# Patient Record
Sex: FEMALE | Race: BLACK OR AFRICAN AMERICAN | NOT HISPANIC OR LATINO | Employment: OTHER | URBAN - METROPOLITAN AREA
[De-identification: names, ages, dates, MRNs, and addresses within clinical notes are randomized per-mention and may not be internally consistent; named-entity substitution may affect disease eponyms.]

---

## 2017-03-22 ENCOUNTER — HISTORICAL (OUTPATIENT)
Dept: INTERNAL MEDICINE | Facility: CLINIC | Age: 67
End: 2017-03-22

## 2017-04-24 ENCOUNTER — HISTORICAL (OUTPATIENT)
Dept: CARDIOLOGY | Facility: HOSPITAL | Age: 67
End: 2017-04-24

## 2017-05-11 ENCOUNTER — HISTORICAL (OUTPATIENT)
Dept: ADMINISTRATIVE | Facility: HOSPITAL | Age: 67
End: 2017-05-11

## 2017-05-11 LAB
ABS NEUT (OLG): 1.96 X10(3)/MCL (ref 2.1–9.2)
APTT PPP: 28.1 SECOND(S) (ref 23.3–37)
BASOPHILS # BLD AUTO: 0.03 X10(3)/MCL
BASOPHILS NFR BLD AUTO: 1 % (ref 0–1)
BUN SERPL-MCNC: 12 MG/DL (ref 7–25)
CALCIUM SERPL-MCNC: 9.9 MG/DL (ref 8.4–10.3)
CHLORIDE SERPL-SCNC: 100 MMOL/L (ref 96–110)
CO2 SERPL-SCNC: 33 MMOL/L (ref 24–32)
CREAT SERPL-MCNC: 0.83 MG/DL (ref 0.7–1.1)
EOSINOPHIL # BLD AUTO: 0.22 X10(3)/MCL
EOSINOPHIL NFR BLD AUTO: 4 % (ref 0–5)
ERYTHROCYTE [DISTWIDTH] IN BLOOD BY AUTOMATED COUNT: 12.3 % (ref 11.5–14.5)
GLUCOSE SERPL-MCNC: 120 MG/DL (ref 65–99)
HCT VFR BLD AUTO: 38.9 % (ref 35–46)
HGB BLD-MCNC: 12.4 GM/DL (ref 12–16)
IMM GRANULOCYTES # BLD AUTO: 0.01 10*3/UL
IMM GRANULOCYTES NFR BLD AUTO: 0 %
INR PPP: 1.1 (ref 0.9–1.2)
LYMPHOCYTES # BLD AUTO: 2.57 X10(3)/MCL
LYMPHOCYTES NFR BLD AUTO: 50 % (ref 15–40)
MCH RBC QN AUTO: 27.7 PG (ref 26–34)
MCHC RBC AUTO-ENTMCNC: 31.9 GM/DL (ref 31–37)
MCV RBC AUTO: 86.8 FL (ref 80–100)
MONOCYTES # BLD AUTO: 0.4 X10(3)/MCL
MONOCYTES NFR BLD AUTO: 8 % (ref 4–12)
NEUTROPHILS # BLD AUTO: 1.96 X10(3)/MCL
NEUTROPHILS NFR BLD AUTO: 38 X10(3)/MCL
PLATELET # BLD AUTO: 233 X10(3)/MCL (ref 130–400)
PMV BLD AUTO: 10.8 FL (ref 7.4–10.4)
POTASSIUM SERPL-SCNC: 3.9 MMOL/L (ref 3.6–5.2)
PROTHROMBIN TIME: 14 SECOND(S) (ref 11.9–14.4)
RBC # BLD AUTO: 4.48 X10(6)/MCL (ref 4–5.2)
SODIUM SERPL-SCNC: 142 MMOL/L (ref 135–146)
WBC # SPEC AUTO: 5.2 X10(3)/MCL (ref 4.5–11)

## 2017-05-22 ENCOUNTER — HISTORICAL (OUTPATIENT)
Dept: CARDIOLOGY | Facility: HOSPITAL | Age: 67
End: 2017-05-22

## 2017-06-28 ENCOUNTER — HISTORICAL (OUTPATIENT)
Dept: CARDIOLOGY | Facility: HOSPITAL | Age: 67
End: 2017-06-28

## 2017-09-22 ENCOUNTER — HISTORICAL (OUTPATIENT)
Dept: INTERNAL MEDICINE | Facility: CLINIC | Age: 67
End: 2017-09-22

## 2017-09-22 LAB
ABS NEUT (OLG): 2.2 X10(3)/MCL (ref 2.1–9.2)
ALBUMIN SERPL-MCNC: 4.2 GM/DL (ref 3.4–5)
ALBUMIN/GLOB SERPL: 1 RATIO (ref 1–2)
ALP SERPL-CCNC: 71 UNIT/L (ref 45–117)
ALT SERPL-CCNC: 19 UNIT/L (ref 12–78)
AST SERPL-CCNC: 14 UNIT/L (ref 15–37)
BASOPHILS # BLD AUTO: 0.02 X10(3)/MCL
BASOPHILS NFR BLD AUTO: 0 % (ref 0–1)
BILIRUB SERPL-MCNC: 0.9 MG/DL (ref 0.2–1)
BILIRUBIN DIRECT+TOT PNL SERPL-MCNC: 0.2 MG/DL
BILIRUBIN DIRECT+TOT PNL SERPL-MCNC: 0.7 MG/DL
BUN SERPL-MCNC: 24 MG/DL (ref 7–18)
CALCIUM SERPL-MCNC: 10.3 MG/DL (ref 8.5–10.1)
CHLORIDE SERPL-SCNC: 100 MMOL/L (ref 98–107)
CHOLEST SERPL-MCNC: 199 MG/DL
CHOLEST/HDLC SERPL: 3.9 {RATIO} (ref 0–4.4)
CO2 SERPL-SCNC: 30 MMOL/L (ref 21–32)
CREAT SERPL-MCNC: 1.2 MG/DL (ref 0.6–1.3)
EOSINOPHIL # BLD AUTO: 0.13 X10(3)/MCL
EOSINOPHIL NFR BLD AUTO: 3 % (ref 0–5)
ERYTHROCYTE [DISTWIDTH] IN BLOOD BY AUTOMATED COUNT: 12.2 % (ref 11.5–14.5)
EST. AVERAGE GLUCOSE BLD GHB EST-MCNC: 269 MG/DL
GLOBULIN SER-MCNC: 3.9 GM/ML (ref 2.3–3.5)
GLUCOSE SERPL-MCNC: 230 MG/DL (ref 74–106)
HBA1C MFR BLD: 11 % (ref 4.2–6.3)
HCT VFR BLD AUTO: 38.2 % (ref 35–46)
HDLC SERPL-MCNC: 51 MG/DL
HGB BLD-MCNC: 12.7 GM/DL (ref 12–16)
IMM GRANULOCYTES # BLD AUTO: 0.01 10*3/UL
IMM GRANULOCYTES NFR BLD AUTO: 0 %
LDLC SERPL CALC-MCNC: 126 MG/DL (ref 0–130)
LYMPHOCYTES # BLD AUTO: 1.95 X10(3)/MCL
LYMPHOCYTES NFR BLD AUTO: 42 % (ref 15–40)
MCH RBC QN AUTO: 28.7 PG (ref 26–34)
MCHC RBC AUTO-ENTMCNC: 33.2 GM/DL (ref 31–37)
MCV RBC AUTO: 86.4 FL (ref 80–100)
MONOCYTES # BLD AUTO: 0.35 X10(3)/MCL
MONOCYTES NFR BLD AUTO: 8 % (ref 4–12)
NEUTROPHILS # BLD AUTO: 2.2 X10(3)/MCL
NEUTROPHILS NFR BLD AUTO: 47 X10(3)/MCL
PLATELET # BLD AUTO: 230 X10(3)/MCL (ref 130–400)
PMV BLD AUTO: 10.5 FL (ref 7.4–10.4)
POTASSIUM SERPL-SCNC: 3.6 MMOL/L (ref 3.5–5.1)
PROT SERPL-MCNC: 8.1 GM/DL (ref 6.4–8.2)
RBC # BLD AUTO: 4.42 X10(6)/MCL (ref 4–5.2)
SODIUM SERPL-SCNC: 137 MMOL/L (ref 136–145)
TRIGL SERPL-MCNC: 108 MG/DL
TSH SERPL-ACNC: 1 MIU/L (ref 0.36–3.74)
VLDLC SERPL CALC-MCNC: 22 MG/DL
WBC # SPEC AUTO: 4.7 X10(3)/MCL (ref 4.5–11)

## 2017-09-28 ENCOUNTER — HISTORICAL (OUTPATIENT)
Dept: INTERNAL MEDICINE | Facility: CLINIC | Age: 67
End: 2017-09-28

## 2018-03-13 ENCOUNTER — HISTORICAL (OUTPATIENT)
Dept: ADMINISTRATIVE | Facility: HOSPITAL | Age: 68
End: 2018-03-13

## 2018-03-13 LAB
BUN SERPL-MCNC: 17 MG/DL (ref 7–18)
CALCIUM SERPL-MCNC: 9.7 MG/DL (ref 8.5–10.1)
CHLORIDE SERPL-SCNC: 102 MMOL/L (ref 98–107)
CO2 SERPL-SCNC: 34 MMOL/L (ref 21–32)
CREAT SERPL-MCNC: 0.9 MG/DL (ref 0.6–1.3)
CREAT/UREA NIT SERPL: 19
EST. AVERAGE GLUCOSE BLD GHB EST-MCNC: 174 MG/DL
GLUCOSE SERPL-MCNC: 98 MG/DL (ref 74–106)
HBA1C MFR BLD: 7.7 % (ref 4.2–6.3)
POTASSIUM SERPL-SCNC: 3.7 MMOL/L (ref 3.5–5.1)
SODIUM SERPL-SCNC: 141 MMOL/L (ref 136–145)

## 2018-05-04 ENCOUNTER — HISTORICAL (OUTPATIENT)
Dept: INTERNAL MEDICINE | Facility: CLINIC | Age: 68
End: 2018-05-04

## 2018-06-20 ENCOUNTER — HISTORICAL (OUTPATIENT)
Dept: INTERNAL MEDICINE | Facility: CLINIC | Age: 68
End: 2018-06-20

## 2018-06-20 LAB
ABS NEUT (OLG): 2.89 X10(3)/MCL (ref 2.1–9.2)
ALBUMIN SERPL-MCNC: 4.3 GM/DL (ref 3.4–5)
ALBUMIN/GLOB SERPL: 1 RATIO (ref 1–2)
ALP SERPL-CCNC: 62 UNIT/L (ref 45–117)
ALT SERPL-CCNC: 23 UNIT/L (ref 12–78)
AST SERPL-CCNC: 23 UNIT/L (ref 15–37)
BASOPHILS # BLD AUTO: 0.04 X10(3)/MCL
BASOPHILS NFR BLD AUTO: 1 %
BILIRUB SERPL-MCNC: 0.6 MG/DL (ref 0.2–1)
BILIRUBIN DIRECT+TOT PNL SERPL-MCNC: 0.2 MG/DL
BILIRUBIN DIRECT+TOT PNL SERPL-MCNC: 0.4 MG/DL
BUN SERPL-MCNC: 18 MG/DL (ref 7–18)
CALCIUM SERPL-MCNC: 10 MG/DL (ref 8.5–10.1)
CHLORIDE SERPL-SCNC: 103 MMOL/L (ref 98–107)
CHOLEST SERPL-MCNC: 173 MG/DL
CHOLEST/HDLC SERPL: 3.5 {RATIO} (ref 0–4.4)
CO2 SERPL-SCNC: 28 MMOL/L (ref 21–32)
CREAT SERPL-MCNC: 1.1 MG/DL (ref 0.6–1.3)
CREAT UR-MCNC: 82 MG/DL
EOSINOPHIL # BLD AUTO: 0.17 X10(3)/MCL
EOSINOPHIL NFR BLD AUTO: 3 %
ERYTHROCYTE [DISTWIDTH] IN BLOOD BY AUTOMATED COUNT: 12.9 % (ref 11.5–14.5)
EST. AVERAGE GLUCOSE BLD GHB EST-MCNC: 148 MG/DL
GLOBULIN SER-MCNC: 3.7 GM/ML (ref 2.3–3.5)
GLUCOSE SERPL-MCNC: 126 MG/DL (ref 74–106)
HBA1C MFR BLD: 6.8 % (ref 4.2–6.3)
HCT VFR BLD AUTO: 38 % (ref 35–46)
HDLC SERPL-MCNC: 49 MG/DL
HGB BLD-MCNC: 11.9 GM/DL (ref 12–16)
IMM GRANULOCYTES # BLD AUTO: 0.01 10*3/UL
IMM GRANULOCYTES NFR BLD AUTO: 0 %
LDLC SERPL CALC-MCNC: 106 MG/DL (ref 0–130)
LYMPHOCYTES # BLD AUTO: 2.37 X10(3)/MCL
LYMPHOCYTES NFR BLD AUTO: 40 % (ref 13–40)
MCH RBC QN AUTO: 27.7 PG (ref 26–34)
MCHC RBC AUTO-ENTMCNC: 31.3 GM/DL (ref 31–37)
MCV RBC AUTO: 88.6 FL (ref 80–100)
MICROALBUMIN UR-MCNC: 7.9 MG/L (ref 0–19)
MICROALBUMIN/CREAT RATIO PNL UR: 9.6 MCG/MG CR (ref 0–29)
MONOCYTES # BLD AUTO: 0.39 X10(3)/MCL
MONOCYTES NFR BLD AUTO: 7 % (ref 4–12)
NEUTROPHILS # BLD AUTO: 2.89 X10(3)/MCL
NEUTROPHILS NFR BLD AUTO: 49 X10(3)/MCL
PLATELET # BLD AUTO: 230 X10(3)/MCL (ref 130–400)
PMV BLD AUTO: 10.2 FL (ref 7.4–10.4)
POTASSIUM SERPL-SCNC: 3.8 MMOL/L (ref 3.5–5.1)
PROT SERPL-MCNC: 8 GM/DL (ref 6.4–8.2)
RBC # BLD AUTO: 4.29 X10(6)/MCL (ref 4–5.2)
SODIUM SERPL-SCNC: 139 MMOL/L (ref 136–145)
TRIGL SERPL-MCNC: 91 MG/DL
VLDLC SERPL CALC-MCNC: 18 MG/DL
WBC # SPEC AUTO: 5.9 X10(3)/MCL (ref 4.5–11)

## 2018-06-25 ENCOUNTER — HISTORICAL (OUTPATIENT)
Dept: RADIOLOGY | Facility: HOSPITAL | Age: 68
End: 2018-06-25

## 2018-08-07 ENCOUNTER — HISTORICAL (OUTPATIENT)
Dept: CARDIOLOGY | Facility: CLINIC | Age: 68
End: 2018-08-07

## 2018-08-07 LAB
APTT PPP: 28.1 SECOND(S) (ref 23.3–37)
BUN SERPL-MCNC: 16 MG/DL (ref 7–18)
CALCIUM SERPL-MCNC: 9.6 MG/DL (ref 8.5–10.1)
CHLORIDE SERPL-SCNC: 102 MMOL/L (ref 98–107)
CO2 SERPL-SCNC: 33 MMOL/L (ref 21–32)
CREAT SERPL-MCNC: 1.1 MG/DL (ref 0.6–1.3)
CREAT/UREA NIT SERPL: 15
ERYTHROCYTE [DISTWIDTH] IN BLOOD BY AUTOMATED COUNT: 12.7 % (ref 11.5–14.5)
GLUCOSE SERPL-MCNC: 106 MG/DL (ref 74–106)
HCT VFR BLD AUTO: 36.4 % (ref 35–46)
HGB BLD-MCNC: 11.6 GM/DL (ref 12–16)
INR PPP: 1.03 (ref 0.9–1.2)
MCH RBC QN AUTO: 28.4 PG (ref 26–34)
MCHC RBC AUTO-ENTMCNC: 31.9 GM/DL (ref 31–37)
MCV RBC AUTO: 89.2 FL (ref 80–100)
PLATELET # BLD AUTO: 234 X10(3)/MCL (ref 130–400)
PMV BLD AUTO: 10.3 FL (ref 7.4–10.4)
POTASSIUM SERPL-SCNC: 3.4 MMOL/L (ref 3.5–5.1)
PROTHROMBIN TIME: 12.8 SECOND(S) (ref 11.9–14.4)
RBC # BLD AUTO: 4.08 X10(6)/MCL (ref 4–5.2)
SODIUM SERPL-SCNC: 141 MMOL/L (ref 136–145)
WBC # SPEC AUTO: 5.9 X10(3)/MCL (ref 4.5–11)

## 2018-08-13 ENCOUNTER — HISTORICAL (OUTPATIENT)
Dept: CARDIOLOGY | Facility: HOSPITAL | Age: 68
End: 2018-08-13

## 2018-09-17 ENCOUNTER — HISTORICAL (OUTPATIENT)
Dept: ADMINISTRATIVE | Facility: HOSPITAL | Age: 68
End: 2018-09-17

## 2018-09-17 LAB
BUN SERPL-MCNC: 17 MG/DL (ref 7–18)
CALCIUM SERPL-MCNC: 10.1 MG/DL (ref 8.5–10.1)
CHLORIDE SERPL-SCNC: 101 MMOL/L (ref 98–107)
CO2 SERPL-SCNC: 30 MMOL/L (ref 21–32)
CREAT SERPL-MCNC: 1.1 MG/DL (ref 0.6–1.3)
CREAT/UREA NIT SERPL: 15
EST. AVERAGE GLUCOSE BLD GHB EST-MCNC: 126 MG/DL
GLUCOSE SERPL-MCNC: 124 MG/DL (ref 74–106)
HBA1C MFR BLD: 6 % (ref 4.2–6.3)
POTASSIUM SERPL-SCNC: 3.7 MMOL/L (ref 3.5–5.1)
SODIUM SERPL-SCNC: 138 MMOL/L (ref 136–145)

## 2018-11-13 ENCOUNTER — HISTORICAL (OUTPATIENT)
Dept: SLEEP MEDICINE | Facility: HOSPITAL | Age: 68
End: 2018-11-13

## 2019-03-14 ENCOUNTER — HISTORICAL (OUTPATIENT)
Dept: ADMINISTRATIVE | Facility: HOSPITAL | Age: 69
End: 2019-03-14

## 2019-03-14 LAB
ABS NEUT (OLG): 2.74 X10(3)/MCL (ref 2.1–9.2)
ALBUMIN SERPL-MCNC: 4.3 GM/DL (ref 3.4–5)
ALBUMIN/GLOB SERPL: 1.1 RATIO (ref 1.1–2)
ALP SERPL-CCNC: 57 UNIT/L (ref 45–117)
ALT SERPL-CCNC: 19 UNIT/L (ref 12–78)
AST SERPL-CCNC: 16 UNIT/L (ref 15–37)
BASOPHILS # BLD AUTO: 0.02 X10(3)/MCL
BASOPHILS NFR BLD AUTO: 0 %
BILIRUB SERPL-MCNC: 0.6 MG/DL (ref 0.2–1)
BILIRUBIN DIRECT+TOT PNL SERPL-MCNC: 0.2 MG/DL
BILIRUBIN DIRECT+TOT PNL SERPL-MCNC: 0.4 MG/DL
BUN SERPL-MCNC: 11 MG/DL (ref 7–18)
CALCIUM SERPL-MCNC: 10 MG/DL (ref 8.5–10.1)
CHLORIDE SERPL-SCNC: 103 MMOL/L (ref 98–107)
CHOLEST SERPL-MCNC: 190 MG/DL
CHOLEST/HDLC SERPL: 2.9 {RATIO} (ref 0–4.4)
CO2 SERPL-SCNC: 32 MMOL/L (ref 21–32)
CREAT SERPL-MCNC: 1 MG/DL (ref 0.6–1.3)
CREAT UR-MCNC: 63 MG/DL
EOSINOPHIL # BLD AUTO: 0.14 X10(3)/MCL
EOSINOPHIL NFR BLD AUTO: 3 %
ERYTHROCYTE [DISTWIDTH] IN BLOOD BY AUTOMATED COUNT: 12.6 % (ref 11.5–14.5)
EST. AVERAGE GLUCOSE BLD GHB EST-MCNC: 114 MG/DL
GLOBULIN SER-MCNC: 4 GM/ML (ref 2.3–3.5)
GLUCOSE SERPL-MCNC: 100 MG/DL (ref 74–106)
HBA1C MFR BLD: 5.6 % (ref 4.2–6.3)
HCT VFR BLD AUTO: 37.6 % (ref 35–46)
HDLC SERPL-MCNC: 66 MG/DL
HGB BLD-MCNC: 11.8 GM/DL (ref 12–16)
IMM GRANULOCYTES # BLD AUTO: 0.01 10*3/UL
IMM GRANULOCYTES NFR BLD AUTO: 0 %
LDLC SERPL CALC-MCNC: 103 MG/DL (ref 0–130)
LYMPHOCYTES # BLD AUTO: 2 X10(3)/MCL
LYMPHOCYTES NFR BLD AUTO: 37 % (ref 13–40)
MCH RBC QN AUTO: 28.6 PG (ref 26–34)
MCHC RBC AUTO-ENTMCNC: 31.4 GM/DL (ref 31–37)
MCV RBC AUTO: 91 FL (ref 80–100)
MICROALBUMIN UR-MCNC: <5 MG/L (ref 0–19)
MICROALBUMIN/CREAT RATIO PNL UR: <7.9 MCG/MG CR (ref 0–29)
MONOCYTES # BLD AUTO: 0.45 X10(3)/MCL
MONOCYTES NFR BLD AUTO: 8 % (ref 4–12)
NEUTROPHILS # BLD AUTO: 2.74 X10(3)/MCL
NEUTROPHILS NFR BLD AUTO: 51 X10(3)/MCL
PLATELET # BLD AUTO: 220 X10(3)/MCL (ref 130–400)
PMV BLD AUTO: 10.5 FL (ref 7.4–10.4)
POTASSIUM SERPL-SCNC: 3.6 MMOL/L (ref 3.5–5.1)
PROT SERPL-MCNC: 8.3 GM/DL (ref 6.4–8.2)
RBC # BLD AUTO: 4.13 X10(6)/MCL (ref 4–5.2)
SODIUM SERPL-SCNC: 138 MMOL/L (ref 136–145)
TRIGL SERPL-MCNC: 104 MG/DL
VLDLC SERPL CALC-MCNC: 21 MG/DL
WBC # SPEC AUTO: 5.4 X10(3)/MCL (ref 4.5–11)

## 2019-05-03 ENCOUNTER — HISTORICAL (OUTPATIENT)
Dept: RADIOLOGY | Facility: HOSPITAL | Age: 69
End: 2019-05-03

## 2019-06-06 ENCOUNTER — HISTORICAL (OUTPATIENT)
Dept: ADMINISTRATIVE | Facility: HOSPITAL | Age: 69
End: 2019-06-06

## 2019-06-11 ENCOUNTER — HISTORICAL (OUTPATIENT)
Dept: ADMINISTRATIVE | Facility: HOSPITAL | Age: 69
End: 2019-06-11

## 2019-06-11 LAB
ABS NEUT (OLG): 2.31 X10(3)/MCL (ref 2.1–9.2)
ALBUMIN SERPL-MCNC: 4.2 GM/DL (ref 3.4–5)
ALBUMIN/GLOB SERPL: 1.1 RATIO (ref 1.1–2)
ALP SERPL-CCNC: 54 UNIT/L (ref 45–117)
ALT SERPL-CCNC: 21 UNIT/L (ref 12–78)
AST SERPL-CCNC: 15 UNIT/L (ref 15–37)
BASOPHILS # BLD AUTO: 0.02 X10(3)/MCL
BASOPHILS NFR BLD AUTO: 0 %
BILIRUB SERPL-MCNC: 0.6 MG/DL (ref 0.2–1)
BILIRUBIN DIRECT+TOT PNL SERPL-MCNC: 0.1 MG/DL
BILIRUBIN DIRECT+TOT PNL SERPL-MCNC: 0.5 MG/DL
BUN SERPL-MCNC: 11 MG/DL (ref 7–18)
CALCIUM SERPL-MCNC: 10 MG/DL (ref 8.5–10.1)
CHLORIDE SERPL-SCNC: 106 MMOL/L (ref 98–107)
CO2 SERPL-SCNC: 33 MMOL/L (ref 21–32)
CREAT SERPL-MCNC: 1 MG/DL (ref 0.6–1.3)
CRP SERPL-MCNC: <0.3 MG/DL
EOSINOPHIL # BLD AUTO: 0.16 10*3/UL
EOSINOPHIL NFR BLD AUTO: 3 %
ERYTHROCYTE [DISTWIDTH] IN BLOOD BY AUTOMATED COUNT: 12.2 % (ref 11.5–14.5)
ERYTHROCYTE [SEDIMENTATION RATE] IN BLOOD: 8 MM/HR (ref 0–20)
GLOBULIN SER-MCNC: 3.8 GM/ML (ref 2.3–3.5)
GLUCOSE SERPL-MCNC: 93 MG/DL (ref 74–106)
HCT VFR BLD AUTO: 36.9 % (ref 35–46)
HGB BLD-MCNC: 11.7 GM/DL (ref 12–16)
LYMPHOCYTES # BLD AUTO: 2.08 X10(3)/MCL
LYMPHOCYTES NFR BLD AUTO: 42 % (ref 13–40)
MCH RBC QN AUTO: 28.2 PG (ref 26–34)
MCHC RBC AUTO-ENTMCNC: 31.7 GM/DL (ref 31–37)
MCV RBC AUTO: 88.9 FL (ref 80–100)
MONOCYTES # BLD AUTO: 0.42 X10(3)/MCL
MONOCYTES NFR BLD AUTO: 8 % (ref 4–12)
NEUTROPHILS # BLD AUTO: 2.31 X10(3)/MCL
NEUTROPHILS NFR BLD AUTO: 46 X10(3)/MCL
PLATELET # BLD AUTO: 231 X10(3)/MCL (ref 130–400)
PMV BLD AUTO: 10.4 FL (ref 7.4–10.4)
POTASSIUM SERPL-SCNC: 4.3 MMOL/L (ref 3.5–5.1)
PROT SERPL-MCNC: 8 GM/DL (ref 6.4–8.2)
RBC # BLD AUTO: 4.15 X10(6)/MCL (ref 4–5.2)
SODIUM SERPL-SCNC: 142 MMOL/L (ref 136–145)
TSH SERPL-ACNC: 1.17 MIU/L (ref 0.36–3.74)
WBC # SPEC AUTO: 5 X10(3)/MCL (ref 4.5–11)

## 2019-06-25 ENCOUNTER — HISTORICAL (OUTPATIENT)
Dept: RADIOLOGY | Facility: HOSPITAL | Age: 69
End: 2019-06-25

## 2019-08-22 ENCOUNTER — HISTORICAL (OUTPATIENT)
Dept: INTERNAL MEDICINE | Facility: CLINIC | Age: 69
End: 2019-08-22

## 2019-09-09 ENCOUNTER — HISTORICAL (OUTPATIENT)
Dept: INTERNAL MEDICINE | Facility: CLINIC | Age: 69
End: 2019-09-09

## 2019-09-09 LAB
BUN SERPL-MCNC: 13 MG/DL (ref 7–18)
CALCIUM SERPL-MCNC: 9.8 MG/DL (ref 8.5–10.1)
CHLORIDE SERPL-SCNC: 104 MMOL/L (ref 98–107)
CO2 SERPL-SCNC: 31 MMOL/L (ref 21–32)
CREAT SERPL-MCNC: 1 MG/DL (ref 0.6–1.3)
CREAT/UREA NIT SERPL: 13
EST. AVERAGE GLUCOSE BLD GHB EST-MCNC: 117 MG/DL
GLUCOSE SERPL-MCNC: 123 MG/DL (ref 74–106)
HBA1C MFR BLD: 5.7 % (ref 4.2–6.3)
POTASSIUM SERPL-SCNC: 4 MMOL/L (ref 3.5–5.1)
SODIUM SERPL-SCNC: 140 MMOL/L (ref 136–145)

## 2019-09-20 ENCOUNTER — HISTORICAL (OUTPATIENT)
Dept: RADIOLOGY | Facility: HOSPITAL | Age: 69
End: 2019-09-20

## 2019-11-11 ENCOUNTER — HISTORICAL (OUTPATIENT)
Dept: RADIOLOGY | Facility: HOSPITAL | Age: 69
End: 2019-11-11

## 2020-03-16 ENCOUNTER — HISTORICAL (OUTPATIENT)
Dept: INTERNAL MEDICINE | Facility: CLINIC | Age: 70
End: 2020-03-16

## 2020-03-16 LAB
ABS NEUT (OLG): 2.02 X10(3)/MCL (ref 2.1–9.2)
ALBUMIN SERPL-MCNC: 4 GM/DL (ref 3.4–5)
ALBUMIN/GLOB SERPL: 1.1 RATIO (ref 1.1–2)
ALP SERPL-CCNC: 55 UNIT/L (ref 45–117)
ALT SERPL-CCNC: 17 UNIT/L (ref 12–78)
AST SERPL-CCNC: 12 UNIT/L (ref 15–37)
BASOPHILS # BLD AUTO: 0 X10(3)/MCL (ref 0–0.2)
BASOPHILS NFR BLD AUTO: 1 %
BILIRUB SERPL-MCNC: 0.6 MG/DL (ref 0.2–1)
BILIRUBIN DIRECT+TOT PNL SERPL-MCNC: 0.2 MG/DL (ref 0–0.2)
BILIRUBIN DIRECT+TOT PNL SERPL-MCNC: 0.4 MG/DL
BUN SERPL-MCNC: 19 MG/DL (ref 7–18)
CALCIUM SERPL-MCNC: 9.5 MG/DL (ref 8.5–10.1)
CHLORIDE SERPL-SCNC: 107 MMOL/L (ref 98–107)
CHOLEST SERPL-MCNC: 128 MG/DL
CHOLEST/HDLC SERPL: 2.2 {RATIO} (ref 0–4.4)
CO2 SERPL-SCNC: 30 MMOL/L (ref 21–32)
CREAT SERPL-MCNC: 1 MG/DL (ref 0.6–1.3)
CREAT UR-MCNC: 28 MG/DL
EOSINOPHIL # BLD AUTO: 0.2 X10(3)/MCL (ref 0–0.9)
EOSINOPHIL NFR BLD AUTO: 4 %
ERYTHROCYTE [DISTWIDTH] IN BLOOD BY AUTOMATED COUNT: 12.6 % (ref 11.5–14.5)
EST. AVERAGE GLUCOSE BLD GHB EST-MCNC: 85 MG/DL
GLOBULIN SER-MCNC: 3.6 GM/ML (ref 2.3–3.5)
GLUCOSE SERPL-MCNC: 86 MG/DL (ref 74–106)
HBA1C MFR BLD: 4.6 % (ref 4.2–6.3)
HCT VFR BLD AUTO: 35.2 % (ref 35–46)
HDLC SERPL-MCNC: 57 MG/DL (ref 40–59)
HGB BLD-MCNC: 10.7 GM/DL (ref 12–16)
IMM GRANULOCYTES # BLD AUTO: 0.01 10*3/UL
IMM GRANULOCYTES NFR BLD AUTO: 0 %
LDLC SERPL CALC-MCNC: 53 MG/DL
LYMPHOCYTES # BLD AUTO: 2.5 X10(3)/MCL (ref 0.6–4.6)
LYMPHOCYTES NFR BLD AUTO: 48 %
MCH RBC QN AUTO: 27.6 PG (ref 26–34)
MCHC RBC AUTO-ENTMCNC: 30.4 GM/DL (ref 31–37)
MCV RBC AUTO: 91 FL (ref 80–100)
MICROALBUMIN UR-MCNC: 24 MG/L (ref 0–19)
MICROALBUMIN/CREAT RATIO PNL UR: 85.7 MCG/MG CR (ref 0–29)
MONOCYTES # BLD AUTO: 0.4 X10(3)/MCL (ref 0.1–1.3)
MONOCYTES NFR BLD AUTO: 8 %
NEUTROPHILS # BLD AUTO: 2.02 X10(3)/MCL (ref 2.1–9.2)
NEUTROPHILS NFR BLD AUTO: 39 %
PLATELET # BLD AUTO: 222 X10(3)/MCL (ref 130–400)
PMV BLD AUTO: 10.2 FL (ref 7.4–10.4)
POTASSIUM SERPL-SCNC: 3.9 MMOL/L (ref 3.5–5.1)
PROT SERPL-MCNC: 7.6 GM/DL (ref 6.4–8.2)
RBC # BLD AUTO: 3.87 X10(6)/MCL (ref 4–5.2)
SODIUM SERPL-SCNC: 142 MMOL/L (ref 136–145)
TRIGL SERPL-MCNC: 90 MG/DL
VLDLC SERPL CALC-MCNC: 18 MG/DL
WBC # SPEC AUTO: 5.2 X10(3)/MCL (ref 4.5–11)

## 2020-06-15 ENCOUNTER — HISTORICAL (OUTPATIENT)
Dept: PREADMISSION TESTING | Facility: HOSPITAL | Age: 70
End: 2020-06-15

## 2020-06-15 LAB
ANTINUCLEAR ANTIBODY SCREEN (OHS): NEGATIVE
CRP SERPL-MCNC: <0.3 MG/DL
DSDNA ANTIBODY (OHS): NEGATIVE
ERYTHROCYTE [SEDIMENTATION RATE] IN BLOOD: 13 MM/HR (ref 0–20)
T PALLIDUM AB SER QL: NONREACTIVE

## 2020-10-01 ENCOUNTER — HISTORICAL (OUTPATIENT)
Dept: RADIOLOGY | Facility: HOSPITAL | Age: 70
End: 2020-10-01

## 2020-10-19 ENCOUNTER — HISTORICAL (OUTPATIENT)
Dept: RADIOLOGY | Facility: HOSPITAL | Age: 70
End: 2020-10-19

## 2020-10-19 LAB
CRP SERPL HS-MCNC: 0.05 MG/DL
EST. AVERAGE GLUCOSE BLD GHB EST-MCNC: 120 MG/DL
FOLATE SERPL-MCNC: 9.4 NG/ML (ref 3.1–17.5)
HBA1C MFR BLD: 5.8 % (ref 4.2–6.3)
PROT SERPL-MCNC: 6.9 GM/DL
T4 FREE SERPL-MCNC: 0.94 NG/DL (ref 0.76–1.46)
TSH SERPL-ACNC: 0.95 MIU/L (ref 0.36–3.74)
VIT B12 SERPL-MCNC: 134 PG/ML (ref 193–986)

## 2020-10-21 ENCOUNTER — HISTORICAL (OUTPATIENT)
Dept: RADIOLOGY | Facility: HOSPITAL | Age: 70
End: 2020-10-21

## 2020-11-04 ENCOUNTER — HISTORICAL (OUTPATIENT)
Dept: RADIOLOGY | Facility: HOSPITAL | Age: 70
End: 2020-11-04

## 2020-11-12 ENCOUNTER — HISTORICAL (OUTPATIENT)
Dept: PREADMISSION TESTING | Facility: HOSPITAL | Age: 70
End: 2020-11-12

## 2020-11-16 ENCOUNTER — HISTORICAL (OUTPATIENT)
Dept: ADMINISTRATIVE | Facility: HOSPITAL | Age: 70
End: 2020-11-16

## 2020-12-09 ENCOUNTER — HISTORICAL (OUTPATIENT)
Dept: ADMINISTRATIVE | Facility: HOSPITAL | Age: 70
End: 2020-12-09

## 2020-12-09 LAB — VIT B12 SERPL-MCNC: 733 PG/ML (ref 213–816)

## 2021-05-08 ENCOUNTER — HISTORICAL (OUTPATIENT)
Dept: ADMINISTRATIVE | Facility: HOSPITAL | Age: 71
End: 2021-05-08

## 2021-05-08 ENCOUNTER — HISTORICAL (OUTPATIENT)
Dept: LAB | Facility: HOSPITAL | Age: 71
End: 2021-05-08

## 2021-05-08 LAB
ABS NEUT (OLG): 2.37 X10(3)/MCL (ref 2.1–9.2)
ALBUMIN SERPL-MCNC: 4.3 GM/DL (ref 3.4–4.8)
ALBUMIN/GLOB SERPL: 1.5 RATIO (ref 1.1–2)
ALP SERPL-CCNC: 53 UNIT/L (ref 40–150)
ALT SERPL-CCNC: 8 UNIT/L (ref 0–55)
AST SERPL-CCNC: 17 UNIT/L (ref 5–34)
BASOPHILS # BLD AUTO: 0 X10(3)/MCL (ref 0–0.2)
BASOPHILS NFR BLD AUTO: 0 %
BILIRUB SERPL-MCNC: 1.1 MG/DL
BILIRUBIN DIRECT+TOT PNL SERPL-MCNC: 0.5 MG/DL (ref 0–0.5)
BILIRUBIN DIRECT+TOT PNL SERPL-MCNC: 0.6 MG/DL (ref 0–0.8)
BUN SERPL-MCNC: 21.4 MG/DL (ref 9.8–20.1)
CALCIUM SERPL-MCNC: 10.6 MG/DL (ref 8.4–10.2)
CHLORIDE SERPL-SCNC: 106 MMOL/L (ref 98–107)
CHOLEST SERPL-MCNC: 117 MG/DL
CHOLEST/HDLC SERPL: 3 {RATIO} (ref 0–5)
CO2 SERPL-SCNC: 25 MMOL/L (ref 23–31)
CREAT SERPL-MCNC: 1.58 MG/DL (ref 0.55–1.02)
CREAT UR-MCNC: 143.1 MG/DL (ref 45–106)
EOSINOPHIL # BLD AUTO: 0.2 X10(3)/MCL (ref 0–0.9)
EOSINOPHIL NFR BLD AUTO: 4 %
ERYTHROCYTE [DISTWIDTH] IN BLOOD BY AUTOMATED COUNT: 12.9 % (ref 11.5–14.5)
EST. AVERAGE GLUCOSE BLD GHB EST-MCNC: 99.7 MG/DL
GLOBULIN SER-MCNC: 2.8 GM/DL (ref 2.4–3.5)
GLUCOSE SERPL-MCNC: 85 MG/DL (ref 82–115)
HBA1C MFR BLD: 5.1 %
HCT VFR BLD AUTO: 34.2 % (ref 35–46)
HDLC SERPL-MCNC: 43 MG/DL (ref 35–60)
HGB BLD-MCNC: 10.5 GM/DL (ref 12–16)
IMM GRANULOCYTES # BLD AUTO: 0.01 10*3/UL
IMM GRANULOCYTES NFR BLD AUTO: 0 %
LDLC SERPL CALC-MCNC: 61 MG/DL (ref 50–140)
LYMPHOCYTES # BLD AUTO: 1.8 X10(3)/MCL (ref 0.6–4.6)
LYMPHOCYTES NFR BLD AUTO: 37 %
MCH RBC QN AUTO: 27.6 PG (ref 26–34)
MCHC RBC AUTO-ENTMCNC: 30.7 GM/DL (ref 31–37)
MCV RBC AUTO: 89.8 FL (ref 80–100)
MICROALBUMIN UR-MCNC: 6.6 MG/L
MICROALBUMIN/CREAT RATIO PNL UR: 4.6 MG/GM CR (ref 0–30)
MONOCYTES # BLD AUTO: 0.4 X10(3)/MCL (ref 0.1–1.3)
MONOCYTES NFR BLD AUTO: 8 %
NEUTROPHILS # BLD AUTO: 2.37 X10(3)/MCL (ref 2.1–9.2)
NEUTROPHILS NFR BLD AUTO: 50 %
PLATELET # BLD AUTO: 222 X10(3)/MCL (ref 130–400)
PMV BLD AUTO: 9.9 FL (ref 7.4–10.4)
POTASSIUM SERPL-SCNC: 4.2 MMOL/L (ref 3.5–5.1)
PROT SERPL-MCNC: 7.1 GM/DL (ref 5.8–7.6)
RBC # BLD AUTO: 3.81 X10(6)/MCL (ref 4–5.2)
SODIUM SERPL-SCNC: 143 MMOL/L (ref 136–145)
TRIGL SERPL-MCNC: 65 MG/DL (ref 37–140)
VLDLC SERPL CALC-MCNC: 13 MG/DL
WBC # SPEC AUTO: 4.8 X10(3)/MCL (ref 4.5–11)

## 2021-06-08 ENCOUNTER — HISTORICAL (OUTPATIENT)
Dept: RADIOLOGY | Facility: HOSPITAL | Age: 71
End: 2021-06-08

## 2021-06-22 ENCOUNTER — HISTORICAL (OUTPATIENT)
Dept: ADMINISTRATIVE | Facility: HOSPITAL | Age: 71
End: 2021-06-22

## 2021-06-30 ENCOUNTER — HISTORICAL (OUTPATIENT)
Dept: SURGERY | Facility: HOSPITAL | Age: 71
End: 2021-06-30

## 2021-10-25 ENCOUNTER — HISTORICAL (OUTPATIENT)
Dept: RADIOLOGY | Facility: HOSPITAL | Age: 71
End: 2021-10-25

## 2021-12-04 ENCOUNTER — HISTORICAL (OUTPATIENT)
Dept: LAB | Facility: HOSPITAL | Age: 71
End: 2021-12-04

## 2021-12-04 LAB
BUN SERPL-MCNC: 17.8 MG/DL (ref 9.8–20.1)
CALCIUM SERPL-MCNC: 11.8 MG/DL (ref 8.7–10.5)
CHLORIDE SERPL-SCNC: 108 MMOL/L (ref 98–107)
CO2 SERPL-SCNC: 26 MMOL/L (ref 23–31)
CREAT SERPL-MCNC: 1.09 MG/DL (ref 0.55–1.02)
CREAT/UREA NIT SERPL: 16
EST. AVERAGE GLUCOSE BLD GHB EST-MCNC: 91.1 MG/DL
GLUCOSE SERPL-MCNC: 97 MG/DL (ref 82–115)
HBA1C MFR BLD: 4.8 %
POTASSIUM SERPL-SCNC: 4.6 MMOL/L (ref 3.5–5.1)
SODIUM SERPL-SCNC: 141 MMOL/L (ref 136–145)

## 2022-04-11 ENCOUNTER — HISTORICAL (OUTPATIENT)
Dept: ADMINISTRATIVE | Facility: HOSPITAL | Age: 72
End: 2022-04-11
Payer: MEDICARE

## 2022-04-28 ENCOUNTER — HISTORICAL (OUTPATIENT)
Dept: PREADMISSION TESTING | Facility: HOSPITAL | Age: 72
End: 2022-04-28
Payer: MEDICARE

## 2022-04-28 ENCOUNTER — HISTORICAL (OUTPATIENT)
Dept: ADMINISTRATIVE | Facility: HOSPITAL | Age: 72
End: 2022-04-28
Payer: MEDICARE

## 2022-04-28 VITALS
DIASTOLIC BLOOD PRESSURE: 67 MMHG | WEIGHT: 168.19 LBS | BODY MASS INDEX: 28.02 KG/M2 | OXYGEN SATURATION: 99 % | SYSTOLIC BLOOD PRESSURE: 120 MMHG | HEIGHT: 65 IN

## 2022-04-28 LAB
ABS NEUT (OLG): 3.37 (ref 2.1–9.2)
ALBUMIN SERPL-MCNC: 4.1 G/DL (ref 3.4–4.8)
ALBUMIN/GLOB SERPL: 1.4 {RATIO} (ref 1.1–2)
ALP SERPL-CCNC: 67 U/L (ref 40–150)
ALT SERPL-CCNC: 11 U/L (ref 0–55)
APTT PPP: 32.1 S (ref 23.2–33.7)
AST SERPL-CCNC: 17 U/L (ref 5–34)
BASOPHILS # BLD AUTO: 0 10*3/UL (ref 0–0.2)
BASOPHILS NFR BLD AUTO: 0 %
BILIRUB SERPL-MCNC: 0.7 MG/DL
BILIRUBIN DIRECT+TOT PNL SERPL-MCNC: 0.3 (ref 0–0.5)
BILIRUBIN DIRECT+TOT PNL SERPL-MCNC: 0.4 (ref 0–0.8)
BUN SERPL-MCNC: 23 MG/DL (ref 9.8–20.1)
CALCIUM SERPL-MCNC: 10.3 MG/DL (ref 8.7–10.5)
CHLORIDE SERPL-SCNC: 105 MMOL/L (ref 98–107)
CO2 SERPL-SCNC: 28 MMOL/L (ref 23–31)
CREAT SERPL-MCNC: 1.23 MG/DL (ref 0.55–1.02)
EOSINOPHIL # BLD AUTO: 0.3 10*3/UL (ref 0–0.9)
EOSINOPHIL NFR BLD AUTO: 6 %
ERYTHROCYTE [DISTWIDTH] IN BLOOD BY AUTOMATED COUNT: 13.4 % (ref 11.5–17)
GLOBULIN SER-MCNC: 3 G/DL (ref 2.4–3.5)
GLUCOSE SERPL-MCNC: 141 MG/DL (ref 82–115)
HCT VFR BLD AUTO: 36.5 % (ref 37–47)
HEMOLYSIS INTERF INDEX SERPL-ACNC: 0
HGB BLD-MCNC: 10.9 G/DL (ref 12–16)
ICTERIC INTERF INDEX SERPL-ACNC: 1
INR PPP: 1 (ref 0–1.3)
LIPEMIC INTERF INDEX SERPL-ACNC: 0
LYMPHOCYTES # BLD AUTO: 1.4 10*3/UL (ref 0.6–4.6)
LYMPHOCYTES NFR BLD AUTO: 24 %
MANUAL DIFF? (OHS): NO
MCH RBC QN AUTO: 27.6 PG (ref 27–31)
MCHC RBC AUTO-ENTMCNC: 29.9 G/DL (ref 33–36)
MCV RBC AUTO: 92.4 FL (ref 80–94)
MONOCYTES # BLD AUTO: 0.5 10*3/UL (ref 0.1–1.3)
MONOCYTES NFR BLD AUTO: 9 %
NEUTROPHILS # BLD AUTO: 3.37 10*3/UL (ref 2.1–9.2)
NEUTROPHILS NFR BLD AUTO: 59 %
PLATELET # BLD AUTO: 218 10*3/UL (ref 130–400)
PMV BLD AUTO: 10.7 FL (ref 9.4–12.4)
POS ERR1 (OHS): NORMAL
POTASSIUM SERPL-SCNC: 4.5 MMOL/L (ref 3.5–5.1)
PROT SERPL-MCNC: 7.1 G/DL (ref 5.8–7.6)
PROTHROMBIN TIME: 13.5 S (ref 12.5–14.5)
RBC # BLD AUTO: 3.95 10*6/UL (ref 4.2–5.4)
SODIUM SERPL-SCNC: 143 MMOL/L (ref 136–145)
WBC # SPEC AUTO: 5.7 10*3/UL (ref 4.5–11.5)

## 2022-04-30 NOTE — PROGRESS NOTES
Patient:   Neris Contreras            MRN: [id number]            FIN: [id number]               Age:   68 years     Sex:  Female     :  1950   Associated Diagnoses:   None   Author:   Temo PEREZ, Paula Marie reviewed: Will discuss with patient during follow up appointment on  2018 at 9:50am.

## 2022-04-30 NOTE — OP NOTE
DATE OF SURGERY:        SURGEON:  Porfirio Cortez MD  ASSISTANT:  Ruby Hunt NP    PREOPERATIVE DIAGNOSIS:  Vitreitis, nonproliferative diabetic retinopathy, and diabetic macular edema, all to the left eye.    POSTOPERATIVE DIAGNOSES:  Vitreitis, nonproliferative diabetic retinopathy, and diabetic macular edema, all to the left eye.    PROCEDURES:  Pars plana vitrectomy with focal endolaser and injection of Kenalog, all to the left eye.    ANESTHESIA:  General.    ESTIMATED BLOOD LOSS:  Less than 5 cc.    COMPLICATIONS:  None.    PROCEDURE INDICATIONS:  Ms. Contreras has a history of severe nonproliferative diabetic retinopathy with chronic clinically significant macular edema.  She also has a history of vitreitis and she requires a vitrectomy with removal of the vitreitis and focal endolaser to the left eye.    PROCEDURE DESCRIPTION:  The patient was taken to the operative theater where general anesthesia was begun.  The left eye was prepped and draped in the normal sterile fashion and a lid speculum was applied.  A standard 3-port, 25-gauge pars plana vitrectomy was performed with all trocars placed 3.5 mm from the surgical limbus.  A core vitrectomy was performed, including removal of the vitreitis and the posterior hyaloid out to the peripheral retina.  Endolaser was used to apply focal laser to a center of hard exudates that was temporal to the macula without complication.  The peripheral retina was examined and no retinal breaks were identified.  Kenalog 1 mg was injected into the vitreous cavity for postoperative inflammation.  All instruments were removed from the eye and all sclerotomies were massaged closed with cotton-tip swabs.  Several drops of TobraDex ophthalmic solution was applied to the eye.  The postoperative intraocular pressure was 15 mmHg.  The lid speculum and eye drape were then removed and the eye was covered with gauze patch and a Lopez shield.  The patient was then transported  to the postoperative care unit to recover.    DISCHARGE CONDITION:  Good.      DISPOSITION:  Home with followup with Dr. Cortez the following day.  This patient tolerated the procedure well.        ______________________________  Porfirio Cortez MD    RIB/UD  DD:  11/16/2020  Time:  08:11AM  DT:  11/16/2020  Time:  08:31AM  Job #:  [id number]

## 2022-04-30 NOTE — PROGRESS NOTES
Patient:   Neris Contreras            MRN: [id number]            FIN: [id number]               Age:   69 years     Sex:  Female     :  1950   Associated Diagnoses:   None   Author:   Temo PEREZ, Paula Marie reviewed: Will discuss with patient during follow up appointment on  2019 at 8:40am.

## 2022-05-02 ENCOUNTER — TELEPHONE (OUTPATIENT)
Dept: GYNECOLOGY | Facility: CLINIC | Age: 72
End: 2022-05-02
Payer: MEDICARE

## 2022-05-02 NOTE — TELEPHONE ENCOUNTER
----- Message from Mariel Bass sent at 5/2/2022  9:50 AM CDT -----  Above Pt called stating she had a voice message to call janet back for her results.   [phone] Home   [phone] Cell  Please advise, Thanks!

## 2022-05-04 NOTE — HISTORICAL [facility] CERNER
This is a historical note converted from [employer]. Formatting and pictures may have been removed.  Please reference Gaudencio for original formatting and attached multimedia. Date of Procedure:?06/30/21  ?  Attending Surgeon:?Dr. Henry  ?  Resident Surgeon: Sree Bullock Jr., MD (PGYI)  ?   Pre-op Diagnosis:?Benign lipomatous neoplasm, right anterior thigh  ?  Post-op Diagnosis: Same  ?   Procedure: Excision of subcutaneous mass right anterior thigh  ?   Findings: Fatty, tumor-like mass?measuring approximately?5 x 4 cm?without any apparent?visual malignancy.??A second roughly 2 x 3 cm mass?just superior to the first it was also removed. ?Gross total resection achieved.  ?   Specimen:?Pathology Tissue [facility] (Right thigh mass,AP Specimen) -right?thigh mass  ?  Procedure Detail:?After informed, witnessed, written consent was obtained, the patient was transported operating room and moved to the operating table in the supine position.? MAC anesthesia was achieved.? The patients right thigh was prepped and draped in the usual sterile fashion. ?A timeout was conducted to ensure the proper?patient, procedure, laterality, and that all safety procedures were being followed. ?It was deemed safe to proceed.? The patient was injected with 1%?Marcaine?along the?incision site and around the mass for local anesthesia.? An 8 cm transverse incision was made?overlying the mass.? Monopolar Bovie electrocautery was used to dissect down through the dermis?and the underlying subcutaneous tissue. ?The mass was encountered almost instantaneously.? We then use a combination of?sharp?and electrocautery dissection?to?dissect the mass away from the surrounding, normal-appearing tissue.? The mass contains several?pseudopod-like projections which were carefully dissected?away from the?normal-appearing tissue. ?The primary mass, measuring roughly 5 x 4 cm, was removed?first. ?We then felt that there was still?abnormal tissue present superior to  this?and after dissection realized a second?fatty?tumor-like mass existed?in this area. ?A roughly 2 x 3 cm mass was dissected from this area.? These were all sent for pathologic evaluation.? Once all the dissection was done,?we achieved meticulous hemostasis with monopolar Bovie electrocautery. ?The wound was then irrigated with copious amounts of normal saline.? The wound was then reexamined for any?obvious,?overt bleeding. ?There was none seen.? The wound was then closed with?interrupted 3-0 Vicryl?deep dermals?and a running 4-0 Monocryl.  ?   At the end of the procedure all sponge, needle, and instrument counts were correct x2. ? was present and scrubbed for the duration of the procedure.? The patient was awakened from anesthesia?in stable condition and transported to the recovery room.  ?  EBL:?10.0  ?  Complications: None  ?  Disposition:?Patient taken to the PACU in HDS condition.  ?  Plan:?Patient is status post removal of?subcutaneous mass in the right anterior thigh. ?The patient overall tolerated this procedure well and there were no intraoperative complications.  -Patient is stable for?discharge home.  -Patient will be discharged with?a?short duration of as needed pain and antiemetic medications.  -Patient will have a follow-up appointment scheduled in the surgery clinic in 1 to 2 weeks.  ?  Sree Bullock Jr., MD  [facility], PGY-1  06/30/21  ?  This certifies I was present during the entire period between opening and closing of the surgical field.  ?  Irving Henry MD

## 2022-05-04 NOTE — HISTORICAL [facility] CERNER
This is a historical note converted from [employer]. Formatting and pictures may have been removed.  Please reference [employer] for original formatting and attached multimedia. Patient seen and examined. No changes to H&P performed within the last 30 days.  -To OR today for right thigh soft tissue mass excision  -Informed consent in chart  -Surgical site marked  ?  Aby Philippe MD  [facility] General Surgery, PGY-1  ?   No change in STM of Right thigh.  Pt has held her Plavix.  Ready for surgery.  ?   Irving Henry MD

## 2022-07-28 ENCOUNTER — OFFICE VISIT (OUTPATIENT)
Dept: FAMILY MEDICINE | Facility: CLINIC | Age: 72
End: 2022-07-28
Payer: MEDICARE

## 2022-07-28 ENCOUNTER — HOSPITAL ENCOUNTER (OUTPATIENT)
Dept: RADIOLOGY | Facility: HOSPITAL | Age: 72
Discharge: HOME | End: 2022-07-28
Attending: FAMILY MEDICINE
Payer: MEDICARE

## 2022-07-28 ENCOUNTER — TELEPHONE (OUTPATIENT)
Dept: FAMILY MEDICINE | Facility: CLINIC | Age: 72
End: 2022-07-28

## 2022-07-28 VITALS
WEIGHT: 181.44 LBS | DIASTOLIC BLOOD PRESSURE: 62 MMHG | SYSTOLIC BLOOD PRESSURE: 122 MMHG | BODY MASS INDEX: 28.45 KG/M2 | HEART RATE: 52 BPM | OXYGEN SATURATION: 100 % | TEMPERATURE: 98 F | RESPIRATION RATE: 18 BRPM

## 2022-07-28 DIAGNOSIS — R09.89 CHEST CONGESTION: ICD-10-CM

## 2022-07-28 DIAGNOSIS — U07.1 COVID: ICD-10-CM

## 2022-07-28 DIAGNOSIS — Z86.73 HISTORY OF CVA IN ADULTHOOD: ICD-10-CM

## 2022-07-28 DIAGNOSIS — I27.20 PULMONARY HYPERTENSION: ICD-10-CM

## 2022-07-28 DIAGNOSIS — Z12.11 COLON CANCER SCREENING: ICD-10-CM

## 2022-07-28 DIAGNOSIS — Z12.31 VISIT FOR SCREENING MAMMOGRAM: ICD-10-CM

## 2022-07-28 DIAGNOSIS — I10 HYPERTENSION, UNSPECIFIED TYPE: ICD-10-CM

## 2022-07-28 DIAGNOSIS — E11.9 TYPE 2 DIABETES MELLITUS WITHOUT COMPLICATION, WITHOUT LONG-TERM CURRENT USE OF INSULIN: ICD-10-CM

## 2022-07-28 DIAGNOSIS — N18.9 CHRONIC KIDNEY DISEASE, UNSPECIFIED CKD STAGE: ICD-10-CM

## 2022-07-28 DIAGNOSIS — G62.9 POLYNEUROPATHY: ICD-10-CM

## 2022-07-28 DIAGNOSIS — N18.9 CHRONIC KIDNEY DISEASE, UNSPECIFIED CKD STAGE: Primary | ICD-10-CM

## 2022-07-28 DIAGNOSIS — E11.65 TYPE 2 DIABETES MELLITUS WITH HYPERGLYCEMIA, WITHOUT LONG-TERM CURRENT USE OF INSULIN: Primary | ICD-10-CM

## 2022-07-28 LAB
ALBUMIN SERPL-MCNC: 4.3 GM/DL (ref 3.4–4.8)
ALBUMIN/GLOB SERPL: 1.3 RATIO (ref 1.1–2)
ALP SERPL-CCNC: 63 UNIT/L (ref 40–150)
ALT SERPL-CCNC: 15 UNIT/L (ref 0–55)
AST SERPL-CCNC: 16 UNIT/L (ref 5–34)
BILIRUBIN DIRECT+TOT PNL SERPL-MCNC: 0.5 MG/DL
BUN SERPL-MCNC: 18.2 MG/DL (ref 9.8–20.1)
CALCIUM SERPL-MCNC: 10.4 MG/DL (ref 8.4–10.2)
CHLORIDE SERPL-SCNC: 105 MMOL/L (ref 98–107)
CO2 SERPL-SCNC: 30 MMOL/L (ref 23–31)
CREAT SERPL-MCNC: 1.18 MG/DL (ref 0.55–1.02)
EST. AVERAGE GLUCOSE BLD GHB EST-MCNC: 96.8 MG/DL
GLOBULIN SER-MCNC: 3.2 GM/DL (ref 2.4–3.5)
GLUCOSE SERPL-MCNC: 114 MG/DL (ref 82–115)
HBA1C MFR BLD: 5 %
POTASSIUM SERPL-SCNC: 4.3 MMOL/L (ref 3.5–5.1)
PROT SERPL-MCNC: 7.5 GM/DL (ref 5.8–7.6)
SODIUM SERPL-SCNC: 141 MMOL/L (ref 136–145)

## 2022-07-28 PROCEDURE — 3074F PR MOST RECENT SYSTOLIC BLOOD PRESSURE < 130 MM HG: ICD-10-PCS | Mod: CPTII,,, | Performed by: FAMILY MEDICINE

## 2022-07-28 PROCEDURE — 99215 OFFICE O/P EST HI 40 MIN: CPT | Mod: PBBFAC,25 | Performed by: FAMILY MEDICINE

## 2022-07-28 PROCEDURE — 3008F PR BODY MASS INDEX (BMI) DOCUMENTED: ICD-10-PCS | Mod: CPTII,,, | Performed by: FAMILY MEDICINE

## 2022-07-28 PROCEDURE — 4010F PR ACE/ARB THEARPY RXD/TAKEN: ICD-10-PCS | Mod: CPTII,,, | Performed by: FAMILY MEDICINE

## 2022-07-28 PROCEDURE — 71046 X-RAY EXAM CHEST 2 VIEWS: CPT | Mod: TC

## 2022-07-28 PROCEDURE — 3078F PR MOST RECENT DIASTOLIC BLOOD PRESSURE < 80 MM HG: ICD-10-PCS | Mod: CPTII,,, | Performed by: FAMILY MEDICINE

## 2022-07-28 PROCEDURE — 1159F PR MEDICATION LIST DOCUMENTED IN MEDICAL RECORD: ICD-10-PCS | Mod: CPTII,,, | Performed by: FAMILY MEDICINE

## 2022-07-28 PROCEDURE — 1126F AMNT PAIN NOTED NONE PRSNT: CPT | Mod: CPTII,,, | Performed by: FAMILY MEDICINE

## 2022-07-28 PROCEDURE — 99204 PR OFFICE/OUTPT VISIT, NEW, LEVL IV, 45-59 MIN: ICD-10-PCS | Mod: S$PBB,,, | Performed by: FAMILY MEDICINE

## 2022-07-28 PROCEDURE — 4010F ACE/ARB THERAPY RXD/TAKEN: CPT | Mod: CPTII,,, | Performed by: FAMILY MEDICINE

## 2022-07-28 PROCEDURE — 1101F PT FALLS ASSESS-DOCD LE1/YR: CPT | Mod: CPTII,,, | Performed by: FAMILY MEDICINE

## 2022-07-28 PROCEDURE — 1126F PR PAIN SEVERITY QUANTIFIED, NO PAIN PRESENT: ICD-10-PCS | Mod: CPTII,,, | Performed by: FAMILY MEDICINE

## 2022-07-28 PROCEDURE — 1101F PR PT FALLS ASSESS DOC 0-1 FALLS W/OUT INJ PAST YR: ICD-10-PCS | Mod: CPTII,,, | Performed by: FAMILY MEDICINE

## 2022-07-28 PROCEDURE — 99204 OFFICE O/P NEW MOD 45 MIN: CPT | Mod: S$PBB,,, | Performed by: FAMILY MEDICINE

## 2022-07-28 PROCEDURE — 3074F SYST BP LT 130 MM HG: CPT | Mod: CPTII,,, | Performed by: FAMILY MEDICINE

## 2022-07-28 PROCEDURE — 3078F DIAST BP <80 MM HG: CPT | Mod: CPTII,,, | Performed by: FAMILY MEDICINE

## 2022-07-28 PROCEDURE — 3008F BODY MASS INDEX DOCD: CPT | Mod: CPTII,,, | Performed by: FAMILY MEDICINE

## 2022-07-28 PROCEDURE — 3288F PR FALLS RISK ASSESSMENT DOCUMENTED: ICD-10-PCS | Mod: CPTII,,, | Performed by: FAMILY MEDICINE

## 2022-07-28 PROCEDURE — 1159F MED LIST DOCD IN RCRD: CPT | Mod: CPTII,,, | Performed by: FAMILY MEDICINE

## 2022-07-28 PROCEDURE — 3288F FALL RISK ASSESSMENT DOCD: CPT | Mod: CPTII,,, | Performed by: FAMILY MEDICINE

## 2022-07-28 PROCEDURE — 36415 COLL VENOUS BLD VENIPUNCTURE: CPT

## 2022-07-28 RX ORDER — AZITHROMYCIN 250 MG/1
TABLET, FILM COATED ORAL
Qty: 6 TABLET | Refills: 0 | Status: SHIPPED | OUTPATIENT
Start: 2022-07-28 | End: 2022-08-02

## 2022-07-28 RX ORDER — CLOPIDOGREL BISULFATE 75 MG/1
75 TABLET ORAL
COMMUNITY
Start: 2021-09-01 | End: 2023-06-01 | Stop reason: SDUPTHER

## 2022-07-28 RX ORDER — GLIPIZIDE 5 MG/1
5 TABLET ORAL
COMMUNITY
Start: 2022-04-12 | End: 2023-01-07

## 2022-07-28 NOTE — PROGRESS NOTES
Neris Contreras  07/28/2022  [id number]                  Chief Complaint:  Chief Complaint   Patient presents with    [facility]     C/o bilateral leg swelling        History of Present Illness:  71 yo F presents to establish care. Would like to change diabetes medication back to metformin from glipizide. Per chart review it was changed due to renal declined. Patient reports no change in CBGs at home; always within range with metformin and glipizide. Last a1c 5.2. Thinks glipizide is causing her to feels hungry and gain weight.       History:  Past Medical History:   Diagnosis Date    CVA (cerebral vascular accident)     Depression     Diabetes mellitus     HLD (hyperlipidemia)     Hypertension     Pulmonary HTN     Sleep apnea      Past Surgical History:   Procedure Laterality Date    CARDIAC CATHETERIZATION      CARDIAC SURGERY      EYE SURGERY      LEG SURGERY Right     OPEN REDUCTION AND INTERNAL FIXATION (ORIF) OF INJURY OF ELBOW      REPAIR OF EYELID Left 5/13/2022    Procedure: REPAIR, EYELID;  Surgeon: Isaak Wang MD;  Location: [facility];  Service: ENT;  Laterality: Left;     No family history on file.  Social History     Socioeconomic History    Marital status:    Tobacco Use    Smoking status: Never Smoker    Smokeless tobacco: Never Used     There is no problem list on file for this patient.    Review of patient's allergies indicates:   Allergen Reactions    Iodine Hives     topical    Iodine and iodide containing products     Shellfish containing products      Other reaction(s): whelps/ itching         Medications:  Current Outpatient Medications on File Prior to Visit   Medication Sig Dispense Refill    amLODIPine (NORVASC) 10 MG tablet Take 10 mg by mouth.      atorvastatin (LIPITOR) 20 MG tablet Take 20 mg by mouth.      benazepriL (LOTENSIN) 40 MG tablet   See Instructions, TAKE 1 TABLET BY MOUTH ONCE DAILY, # 90 tab(s), 3 Refill(s), Pharmacy: [facility],  168, cm, Height/Length Dosing, 07/15/21 10:50:00 CDT, 75.6, kg, Weight Dosing, 07/15/21 10:50:00 CDT      clopidogreL (PLAVIX) 75 mg tablet       clopidogreL (PLAVIX) 75 mg tablet Take 75 mg by mouth.      furosemide (LASIX) 20 MG tablet Take 20 mg by mouth.      glipiZIDE (GLUCOTROL) 5 MG tablet       glipiZIDE (GLUCOTROL) 5 MG tablet Take 5 mg by mouth.      hydrALAZINE (APRESOLINE) 25 MG tablet   See Instructions, TAKE 1 TABLET BY MOUTH  TWICE DAILY, # 180 tab(s), 3 Refill(s), Pharmacy: [facility], 168, cm, Height/Length Dosing, 07/15/21 10:50:00 CDT, 75.6, kg, Weight Dosing, 07/15/21 10:50:00 CDT      hydroCHLOROthiazide (HYDRODIURIL) 25 MG tablet Take 12.5 mg by mouth.      potassium chloride (K-TAB) 20 mEq Take 20 mEq by mouth.      cefdinir (OMNICEF) 300 MG capsule Take 300 mg by mouth every 12 (twelve) hours.      metFORMIN (GLUCOPHAGE) 500 MG tablet   See Instructions, TAKE 1 TABLET BY MOUTH  TWICE DAILY, # 60 tab(s), 2 Refill(s), Pharmacy: [facility], 165, cm, Height/Length Dosing, 12/08/21 14:06:00 CST, 76.3, kg, Weight Dosing, 12/08/21 14:06:00 CST       No current facility-administered medications on file prior to visit.       Medications have been reviewed and reconciled with patient at this visit.    Adverse reactions to current medications reviewed with patient..      Exam:  Wt Readings from Last 3 Encounters:   07/28/22 82.3 kg (181 lb 7 oz)   05/10/22 75.3 kg (166 lb 0.1 oz)   12/08/21 76.3 kg (168 lb 3.4 oz)     Temp Readings from Last 3 Encounters:   07/28/22 97.9 °F (36.6 °C) (Oral)   05/13/22 97.4 °F (36.3 °C) (Oral)     BP Readings from Last 3 Encounters:   07/28/22 122/62   05/13/22 (!) 145/74   12/08/21 120/67     Pulse Readings from Last 3 Encounters:   07/28/22 (!) 52   05/13/22 63     Body mass index is 28.45 kg/m².      ROS:  See HPI for details      All Other ROS: Negative except as stated in HPI.    PE:  General: Alert and oriented, No acute  distress.  Head: Normocephalic, Atraumatic.  Eye: Pupils are equal, round and reactive to light, Extraocular movements are intact, Sclera non-icteric.  Ears/Nose/Throat: Normal, Mucosa moist,Clear.  Neck/Thyroid: Supple, Non-tender, No carotid bruit, No palpable thyromegaly or thyroid nodule,   Respiratory: Clear to auscultation bilaterally; No wheezes, rales or rhonchi,Non-labored respirations, Symmetrical chest wall expansion.  Cardiovascular: Regular rate and rhythm, S1/S2 normal, No murmurs, rubs or gallops.  Gastrointestinal: Soft, Non-tender, Non-distended, Normal bowel sounds, No palpable organomegaly.  Musculoskeletal: Normal range of motion.  Integumentary: Warm, Dry, Intact, No suspicious lesions or rashes.  Extremities: No clubbing, cyanosis or edema  Neurologic: No focal deficits,Motor strength normal upper and lower extremities, Sensory exam intact.  Psychiatric: Normal interaction, Coherent speech, Euthymic mood, Appropriate affect    Laboratory Reviewed ({Yes)  Lab Results   Component Value Date    WBC 5.7 04/28/2022    HGB 10.9 04/28/2022    HCT 36.5 04/28/2022     04/28/2022    CHOL 117 05/08/2021    TRIG 65 05/08/2021    HDL 43 05/08/2021    ALT 11 04/28/2022    AST 17 04/28/2022     04/28/2022    K 4.5 04/28/2022    CREATININE 1.23 04/28/2022    BUN 23.0 04/28/2022    CO2 28 04/28/2022    TSH 0.951 10/19/2020    INR 1.0 04/28/2022    HGBA1C 4.8 12/04/2021       Neris was seen today for establish care.    Diagnoses and all orders for this visit:    Chronic kidney disease, unspecified CKD stage  -     Hemoglobin A1C; Future  -     Comprehensive Metabolic Panel; Future    Hypertension, unspecified type  -     Hemoglobin A1C; Future  -     Comprehensive Metabolic Panel; Future    Type 2 diabetes mellitus without complication, without long-term current use of insulin  -     Hemoglobin A1C; Future  -     Comprehensive Metabolic Panel; Future    History of CVA in adulthood  -     Hemoglobin  A1C; Future  -     Comprehensive Metabolic Panel; Future    Pulmonary hypertension  -     Hemoglobin A1C; Future  -     Comprehensive Metabolic Panel; Future    Chest congestion  -     X-Ray Chest PA And Lateral; Future    COVID  -     X-Ray Chest PA And Lateral; Future    Colon cancer screening  -     Cologuard Screening (Multitarget Stool DNA); Future  -     Cologuard Screening (Multitarget Stool DNA)    Polyneuropathy      Labs today  CXR for chest congestion symptoms  Will make plan pending labs regarding metformin,glipizide  Cologuard ordred  Mammogram 10/2021,orderd today  Keep follow up with cards  DASH and diabatic diet  Continue glucose and  BP logs          Care Plan/Goals: Reviewed    Goals    None         Follow up: Follow up in about 6 months (around 1/28/2023).

## 2022-07-28 NOTE — TELEPHONE ENCOUNTER
Please let patient know:    CXR WNL. Will send Zpak to pharmacy. Continue Mucinex for chest congestion.     Renal function is stable. Diabetes is very well controlled.  A1c is 5.0    I recommend discontinuing glipizide and metformin because diabetes is so well controlled. Recommend continued lifestyle modifications with diabetic diet and exercise. We can repeat a1c in 3 months. Order placed.

## 2022-10-31 ENCOUNTER — TELEPHONE (OUTPATIENT)
Dept: FAMILY MEDICINE | Facility: CLINIC | Age: 72
End: 2022-10-31
Payer: MEDICARE

## 2022-10-31 NOTE — TELEPHONE ENCOUNTER
----- Message from Ping Gutierrez LPN sent at 10/28/2022  2:32 PM CDT -----  Regarding: FW: Phone Call  Called and spoke to patient. She states that she has been having problems with her bowel. She has been constipated and has tried OTC. She is requesting to be prescribed Linzess. She has seen the commercial and had friends and family say that is works and how helpful it is.   ----- Message -----  From: Paula Emery  Sent: 10/28/2022  11:30 AM CDT  To: [facility] Clinical Support Staff  Subject: Phone Call                                       Patient asked for the nurse to please give her a return call. Patient contact [phone].  Please and Thank You.

## 2022-11-03 ENCOUNTER — OFFICE VISIT (OUTPATIENT)
Dept: FAMILY MEDICINE | Facility: CLINIC | Age: 72
End: 2022-11-03
Payer: MEDICARE

## 2022-11-03 VITALS
RESPIRATION RATE: 18 BRPM | DIASTOLIC BLOOD PRESSURE: 67 MMHG | WEIGHT: 175.06 LBS | HEIGHT: 67 IN | SYSTOLIC BLOOD PRESSURE: 135 MMHG | HEART RATE: 44 BPM | BODY MASS INDEX: 27.48 KG/M2 | TEMPERATURE: 98 F

## 2022-11-03 DIAGNOSIS — M79.606 PAIN OF LOWER EXTREMITY, UNSPECIFIED LATERALITY: ICD-10-CM

## 2022-11-03 DIAGNOSIS — G62.9 NEUROPATHY: ICD-10-CM

## 2022-11-03 DIAGNOSIS — K59.00 CONSTIPATION, UNSPECIFIED CONSTIPATION TYPE: Primary | ICD-10-CM

## 2022-11-03 PROCEDURE — 3075F PR MOST RECENT SYSTOLIC BLOOD PRESS GE 130-139MM HG: ICD-10-PCS | Mod: CPTII,,, | Performed by: FAMILY MEDICINE

## 2022-11-03 PROCEDURE — 3288F PR FALLS RISK ASSESSMENT DOCUMENTED: ICD-10-PCS | Mod: CPTII,,, | Performed by: FAMILY MEDICINE

## 2022-11-03 PROCEDURE — 1159F MED LIST DOCD IN RCRD: CPT | Mod: CPTII,,, | Performed by: FAMILY MEDICINE

## 2022-11-03 PROCEDURE — 99215 OFFICE O/P EST HI 40 MIN: CPT | Mod: PBBFAC | Performed by: FAMILY MEDICINE

## 2022-11-03 PROCEDURE — 99214 OFFICE O/P EST MOD 30 MIN: CPT | Mod: S$PBB,,, | Performed by: FAMILY MEDICINE

## 2022-11-03 PROCEDURE — 1101F PT FALLS ASSESS-DOCD LE1/YR: CPT | Mod: CPTII,,, | Performed by: FAMILY MEDICINE

## 2022-11-03 PROCEDURE — 3008F PR BODY MASS INDEX (BMI) DOCUMENTED: ICD-10-PCS | Mod: CPTII,,, | Performed by: FAMILY MEDICINE

## 2022-11-03 PROCEDURE — 3075F SYST BP GE 130 - 139MM HG: CPT | Mod: CPTII,,, | Performed by: FAMILY MEDICINE

## 2022-11-03 PROCEDURE — 3008F BODY MASS INDEX DOCD: CPT | Mod: CPTII,,, | Performed by: FAMILY MEDICINE

## 2022-11-03 PROCEDURE — 1101F PR PT FALLS ASSESS DOC 0-1 FALLS W/OUT INJ PAST YR: ICD-10-PCS | Mod: CPTII,,, | Performed by: FAMILY MEDICINE

## 2022-11-03 PROCEDURE — 1159F PR MEDICATION LIST DOCUMENTED IN MEDICAL RECORD: ICD-10-PCS | Mod: CPTII,,, | Performed by: FAMILY MEDICINE

## 2022-11-03 PROCEDURE — 3078F DIAST BP <80 MM HG: CPT | Mod: CPTII,,, | Performed by: FAMILY MEDICINE

## 2022-11-03 PROCEDURE — 3288F FALL RISK ASSESSMENT DOCD: CPT | Mod: CPTII,,, | Performed by: FAMILY MEDICINE

## 2022-11-03 PROCEDURE — 4010F PR ACE/ARB THEARPY RXD/TAKEN: ICD-10-PCS | Mod: CPTII,,, | Performed by: FAMILY MEDICINE

## 2022-11-03 PROCEDURE — 1126F PR PAIN SEVERITY QUANTIFIED, NO PAIN PRESENT: ICD-10-PCS | Mod: CPTII,,, | Performed by: FAMILY MEDICINE

## 2022-11-03 PROCEDURE — 3078F PR MOST RECENT DIASTOLIC BLOOD PRESSURE < 80 MM HG: ICD-10-PCS | Mod: CPTII,,, | Performed by: FAMILY MEDICINE

## 2022-11-03 PROCEDURE — 4010F ACE/ARB THERAPY RXD/TAKEN: CPT | Mod: CPTII,,, | Performed by: FAMILY MEDICINE

## 2022-11-03 PROCEDURE — 99214 PR OFFICE/OUTPT VISIT, EST, LEVL IV, 30-39 MIN: ICD-10-PCS | Mod: S$PBB,,, | Performed by: FAMILY MEDICINE

## 2022-11-03 PROCEDURE — 1126F AMNT PAIN NOTED NONE PRSNT: CPT | Mod: CPTII,,, | Performed by: FAMILY MEDICINE

## 2022-11-03 RX ORDER — FLUTICASONE PROPIONATE 50 MCG
1 SPRAY, SUSPENSION (ML) NASAL DAILY PRN
COMMUNITY
End: 2023-11-03 | Stop reason: SDUPTHER

## 2022-11-03 NOTE — PROGRESS NOTES
"Neris Contreras  11/03/2022  [id number]              Visit Type:a scheduled routine follow-up visit    Chief Complaint:  Chief Complaint   Patient presents with    Has question about health        History of Present Illness:  71 yo presents for follow up  Complaining of continued constipation symptoms. Requesting Linzess.   Has tried several regimens in the past without improvement of symptoms.   Denied change in stool, abdomina pain, weight changes  Also requesting to have circulation check as her feet " feel funny". Unable to further describe      History:  Past Medical History:   Diagnosis Date    CVA (cerebral vascular accident)     Depression     Diabetes mellitus     HLD (hyperlipidemia)     Hypertension     Pulmonary HTN     Sleep apnea      Past Surgical History:   Procedure Laterality Date    CARDIAC CATHETERIZATION      CARDIAC SURGERY      EYE SURGERY      LEG SURGERY Right     OPEN REDUCTION AND INTERNAL FIXATION (ORIF) OF INJURY OF ELBOW      REPAIR OF EYELID Left 5/13/2022    Procedure: REPAIR, EYELID;  Surgeon: Isaak Wang MD;  Location: [facility];  Service: ENT;  Laterality: Left;     History reviewed. No pertinent family history.  Social History     Socioeconomic History    Marital status:    Tobacco Use    Smoking status: Never    Smokeless tobacco: Never   Substance and Sexual Activity    Alcohol use: Yes    Drug use: Never     There is no problem list on file for this patient.    Review of patient's allergies indicates:   Allergen Reactions    Iodine Hives     topical    Iodine and iodide containing products     Shellfish containing products      Other reaction(s): whelps/ itching         Medications:  Current Outpatient Medications on File Prior to Visit   Medication Sig Dispense Refill    amLODIPine (NORVASC) 10 MG tablet Take 10 mg by mouth.      atorvastatin (LIPITOR) 20 MG tablet Take 20 mg by mouth.      benazepriL (LOTENSIN) 40 MG tablet   See Instructions, TAKE 1 TABLET BY MOUTH " ONCE DAILY, # 90 tab(s), 3 Refill(s), Pharmacy: [facility], 168, cm, Height/Length Dosing, 07/15/21 10:50:00 CDT, 75.6, kg, Weight Dosing, 07/15/21 10:50:00 CDT      clopidogreL (PLAVIX) 75 mg tablet       fluticasone propionate (FLONASE) 50 mcg/actuation nasal spray 1 spray by Each Nostril route daily as needed for Rhinitis.      furosemide (LASIX) 20 MG tablet Take 20 mg by mouth.      glipiZIDE (GLUCOTROL) 5 MG tablet Take 5 mg by mouth.      hydrALAZINE (APRESOLINE) 25 MG tablet   See Instructions, TAKE 1 TABLET BY MOUTH  TWICE DAILY, # 180 tab(s), 3 Refill(s), [facility], 168, cm, Height/Length Dosing, 07/15/21 10:50:00 CDT, 75.6, kg, Weight Dosing, 07/15/21 10:50:00 CDT      hydroCHLOROthiazide (HYDRODIURIL) 25 MG tablet Take 12.5 mg by mouth.      potassium chloride (K-TAB) 20 mEq Take 20 mEq by mouth.      cefdinir (OMNICEF) 300 MG capsule Take 300 mg by mouth every 12 (twelve) hours.      clopidogreL (PLAVIX) 75 mg tablet Take 75 mg by mouth.      glipiZIDE (GLUCOTROL) 5 MG tablet       metFORMIN (GLUCOPHAGE) 500 MG tablet   See Instructions, TAKE 1 TABLET BY MOUTH  TWICE DAILY, # 60 tab(s), 2 Refill(s), Pharmacy: [facility], 165, cm, Height/Length Dosing, 12/08/21 14:06:00 CST, 76.3, kg, Weight Dosing, 12/08/21 14:06:00 CST       No current facility-administered medications on file prior to visit.       Medications have been reviewed and reconciled with patient at this visit.    Adverse reactions to current medications reviewed with patient..      Exam:  Wt Readings from Last 3 Encounters:   11/03/22 79.4 kg (175 lb 0.7 oz)   07/28/22 82.3 kg (181 lb 7 oz)   05/10/22 75.3 kg (166 lb 0.1 oz)     Temp Readings from Last 3 Encounters:   11/03/22 97.7 °F (36.5 °C)   07/28/22 97.9 °F (36.6 °C) (Oral)   05/13/22 97.4 °F (36.3 °C) (Oral)     BP Readings from Last 3 Encounters:   11/03/22 135/67   07/28/22 122/62   05/13/22 (!) 145/74     Pulse Readings from Last 3  Encounters:   11/03/22 (!) 44   07/28/22 (!) 52   05/13/22 63     Body mass index is 27.42 kg/m².      ROS:  See HPI for details      All Other ROS: Negative except as stated in HPI.    PE:  General: Alert and oriented, No acute distress.  Head: Normocephalic, Atraumatic.  Eye: Pupils are equal, round and reactive to light, Extraocular movements are intact, Sclera non-icteric.  Ears/Nose/Throat: Normal, Mucosa moist,Clear.  Neck/Thyroid: Supple, Non-tender, No carotid bruit, No lymphadenopathy, No JVD, Full range of motion.  Respiratory: Clear to auscultation bilaterally; No wheezes, rales or rhonchi,Non-labored respirations, Symmetrical chest wall expansion.  Cardiovascular: Regular rate and rhythm, S1/S2 normal, No murmurs, rubs or gallops.  Gastrointestinal: Soft, Non-tender, Non-distended, Normal bowel sounds, No palpable organomegaly.  Musculoskeletal: Normal range of motion.      Laboratory Reviewed ({Yes)  Lab Results   Component Value Date    WBC 5.7 04/28/2022    HGB 10.9 04/28/2022    HCT 36.5 04/28/2022     04/28/2022    CHOL 117 05/08/2021    TRIG 65 05/08/2021    HDL 43 05/08/2021    ALT 15 07/28/2022    AST 16 07/28/2022     07/28/2022    K 4.3 07/28/2022    CREATININE 1.18 (H) 07/28/2022    BUN 18.2 07/28/2022    CO2 30 07/28/2022    TSH 0.951 10/19/2020    INR 1.0 04/28/2022    HGBA1C 5.0 07/28/2022       Neris was seen today for has question about health .    Diagnoses and all orders for this visit:    Constipation, unspecified constipation type    Neuropathy    Pain of lower extremity, unspecified laterality    Other orders  The following orders have not been finalized:  -     linaCLOtide (LINZESS) 145 mcg Cap capsule        Assessment as above  Symptoms sound like neuropathy; declined oral medication  Names of OTC neuropathy creams  Arterial Us ordered per patient request  Linzess for chronic constipation symptoms  Increase fiber, water intake, movement  Failed stool softeners,  miralax, laxatives in the past  Asymptomatic bradycardia, chronic issue        Care Plan/Goals: Reviewed    Goals    None         Follow up: No follow-ups on file.

## 2022-11-22 ENCOUNTER — TELEPHONE (OUTPATIENT)
Dept: FAMILY MEDICINE | Facility: CLINIC | Age: 72
End: 2022-11-22
Payer: MEDICARE

## 2022-11-22 NOTE — TELEPHONE ENCOUNTER
Please let patient know:    There are no significant blockages in her legs. There is a baker's cyst behind her right knee, which is associated with arthritis.  Recommend OTC analgesics like tylenol, if she has right knee pain.

## 2023-02-22 NOTE — TELEPHONE ENCOUNTER
----- Message from Paula Emery sent at 2/22/2023 10:30 AM CST -----  Regarding: Refill  Provider: Dr. Ariadne Fritz  [facility] - [phone](FAX)  Last Visit:   Next Visit: 4/14/23  Patient's Contact Number: [phone]    1. Name of Medication: furosemide  Dosage: 20 MG TAB  Comments:     2. Name of Medication: Hydrochlorothiazide  Dosage: 12.5 MG TAB  Comments:     3. Name of Medication:   Dosage:   Comments     4. Name of Medication:   Dosage:   Comments:     5. Name of Medication:   Dosage:   Comments:

## 2023-02-23 LAB — NONINV COLON CA DNA+OCC BLD SCRN STL QL: NEGATIVE

## 2023-02-26 RX ORDER — HYDROCHLOROTHIAZIDE 25 MG/1
12.5 TABLET ORAL DAILY
Qty: 15 TABLET | Refills: 0 | Status: SHIPPED | OUTPATIENT
Start: 2023-02-26 | End: 2023-02-27 | Stop reason: ALTCHOICE

## 2023-02-27 ENCOUNTER — OFFICE VISIT (OUTPATIENT)
Dept: FAMILY MEDICINE | Facility: CLINIC | Age: 73
End: 2023-02-27
Payer: MEDICARE

## 2023-02-27 VITALS
TEMPERATURE: 98 F | DIASTOLIC BLOOD PRESSURE: 78 MMHG | BODY MASS INDEX: 28.93 KG/M2 | OXYGEN SATURATION: 100 % | HEART RATE: 50 BPM | SYSTOLIC BLOOD PRESSURE: 151 MMHG | RESPIRATION RATE: 18 BRPM | HEIGHT: 66 IN | WEIGHT: 180 LBS

## 2023-02-27 DIAGNOSIS — I1A.0 RESISTANT HYPERTENSION: Primary | ICD-10-CM

## 2023-02-27 DIAGNOSIS — R00.1 BRADYCARDIA: ICD-10-CM

## 2023-02-27 DIAGNOSIS — R73.03 PREDIABETES: ICD-10-CM

## 2023-02-27 DIAGNOSIS — N18.9 CHRONIC KIDNEY DISEASE, UNSPECIFIED CKD STAGE: ICD-10-CM

## 2023-02-27 LAB
ALBUMIN SERPL-MCNC: 4.2 G/DL (ref 3.4–4.8)
ALBUMIN/GLOB SERPL: 1.4 RATIO (ref 1.1–2)
ALP SERPL-CCNC: 72 UNIT/L (ref 40–150)
ALT SERPL-CCNC: 10 UNIT/L (ref 0–55)
AST SERPL-CCNC: 15 UNIT/L (ref 5–34)
BASOPHILS # BLD AUTO: 0.03 X10(3)/MCL (ref 0–0.2)
BASOPHILS NFR BLD AUTO: 0.5 %
BILIRUBIN DIRECT+TOT PNL SERPL-MCNC: 0.9 MG/DL
BUN SERPL-MCNC: 16.4 MG/DL (ref 9.8–20.1)
CALCIUM SERPL-MCNC: 10.2 MG/DL (ref 8.4–10.2)
CHLORIDE SERPL-SCNC: 107 MMOL/L (ref 98–107)
CHOLEST SERPL-MCNC: 128 MG/DL
CHOLEST/HDLC SERPL: 3 {RATIO} (ref 0–5)
CO2 SERPL-SCNC: 26 MMOL/L (ref 23–31)
CREAT SERPL-MCNC: 1.01 MG/DL (ref 0.55–1.02)
EOSINOPHIL # BLD AUTO: 0.2 X10(3)/MCL (ref 0–0.9)
EOSINOPHIL NFR BLD AUTO: 3.6 %
ERYTHROCYTE [DISTWIDTH] IN BLOOD BY AUTOMATED COUNT: 12.9 % (ref 11.5–17)
EST. AVERAGE GLUCOSE BLD GHB EST-MCNC: 108.3 MG/DL
GFR SERPLBLD CREATININE-BSD FMLA CKD-EPI: 59 MLS/MIN/1.73/M2
GLOBULIN SER-MCNC: 3.1 GM/DL (ref 2.4–3.5)
GLUCOSE SERPL-MCNC: 88 MG/DL (ref 82–115)
HBA1C MFR BLD: 5.4 %
HCT VFR BLD AUTO: 35.7 % (ref 37–47)
HDLC SERPL-MCNC: 47 MG/DL (ref 35–60)
HGB BLD-MCNC: 11.1 G/DL (ref 12–16)
IMM GRANULOCYTES # BLD AUTO: 0.01 X10(3)/MCL (ref 0–0.04)
IMM GRANULOCYTES NFR BLD AUTO: 0.2 %
LDLC SERPL CALC-MCNC: 68 MG/DL (ref 50–140)
LYMPHOCYTES # BLD AUTO: 2.25 X10(3)/MCL (ref 0.6–4.6)
LYMPHOCYTES NFR BLD AUTO: 40.5 %
MCH RBC QN AUTO: 27.9 PG
MCHC RBC AUTO-ENTMCNC: 31.1 G/DL (ref 33–36)
MCV RBC AUTO: 89.7 FL (ref 80–94)
MONOCYTES # BLD AUTO: 0.41 X10(3)/MCL (ref 0.1–1.3)
MONOCYTES NFR BLD AUTO: 7.4 %
NEUTROPHILS # BLD AUTO: 2.66 X10(3)/MCL (ref 2.1–9.2)
NEUTROPHILS NFR BLD AUTO: 47.8 %
NRBC BLD AUTO-RTO: 0 %
PLATELET # BLD AUTO: 229 X10(3)/MCL (ref 130–400)
PMV BLD AUTO: 11.2 FL (ref 7.4–10.4)
POTASSIUM SERPL-SCNC: 3.7 MMOL/L (ref 3.5–5.1)
PROT SERPL-MCNC: 7.3 GM/DL (ref 5.8–7.6)
RBC # BLD AUTO: 3.98 X10(6)/MCL (ref 4.2–5.4)
SODIUM SERPL-SCNC: 142 MMOL/L (ref 136–145)
TRIGL SERPL-MCNC: 63 MG/DL (ref 37–140)
TSH SERPL-ACNC: 0.78 UIU/ML (ref 0.35–4.94)
VLDLC SERPL CALC-MCNC: 13 MG/DL
WBC # SPEC AUTO: 5.6 X10(3)/MCL (ref 4.5–11.5)

## 2023-02-27 PROCEDURE — 80061 LIPID PANEL: CPT | Performed by: FAMILY MEDICINE

## 2023-02-27 PROCEDURE — 3077F PR MOST RECENT SYSTOLIC BLOOD PRESSURE >= 140 MM HG: ICD-10-PCS | Mod: CPTII,,, | Performed by: FAMILY MEDICINE

## 2023-02-27 PROCEDURE — 84443 ASSAY THYROID STIM HORMONE: CPT | Performed by: FAMILY MEDICINE

## 2023-02-27 PROCEDURE — 4010F PR ACE/ARB THEARPY RXD/TAKEN: ICD-10-PCS | Mod: CPTII,,, | Performed by: FAMILY MEDICINE

## 2023-02-27 PROCEDURE — 99214 OFFICE O/P EST MOD 30 MIN: CPT | Mod: S$PBB,,, | Performed by: FAMILY MEDICINE

## 2023-02-27 PROCEDURE — 4010F ACE/ARB THERAPY RXD/TAKEN: CPT | Mod: CPTII,,, | Performed by: FAMILY MEDICINE

## 2023-02-27 PROCEDURE — 3288F PR FALLS RISK ASSESSMENT DOCUMENTED: ICD-10-PCS | Mod: CPTII,,, | Performed by: FAMILY MEDICINE

## 2023-02-27 PROCEDURE — 99215 OFFICE O/P EST HI 40 MIN: CPT | Mod: PBBFAC | Performed by: FAMILY MEDICINE

## 2023-02-27 PROCEDURE — 83036 HEMOGLOBIN GLYCOSYLATED A1C: CPT | Performed by: FAMILY MEDICINE

## 2023-02-27 PROCEDURE — 99214 PR OFFICE/OUTPT VISIT, EST, LEVL IV, 30-39 MIN: ICD-10-PCS | Mod: S$PBB,,, | Performed by: FAMILY MEDICINE

## 2023-02-27 PROCEDURE — 1101F PT FALLS ASSESS-DOCD LE1/YR: CPT | Mod: CPTII,,, | Performed by: FAMILY MEDICINE

## 2023-02-27 PROCEDURE — 1159F PR MEDICATION LIST DOCUMENTED IN MEDICAL RECORD: ICD-10-PCS | Mod: CPTII,,, | Performed by: FAMILY MEDICINE

## 2023-02-27 PROCEDURE — 85025 COMPLETE CBC W/AUTO DIFF WBC: CPT | Performed by: FAMILY MEDICINE

## 2023-02-27 PROCEDURE — 1101F PR PT FALLS ASSESS DOC 0-1 FALLS W/OUT INJ PAST YR: ICD-10-PCS | Mod: CPTII,,, | Performed by: FAMILY MEDICINE

## 2023-02-27 PROCEDURE — 3078F PR MOST RECENT DIASTOLIC BLOOD PRESSURE < 80 MM HG: ICD-10-PCS | Mod: CPTII,,, | Performed by: FAMILY MEDICINE

## 2023-02-27 PROCEDURE — 3288F FALL RISK ASSESSMENT DOCD: CPT | Mod: CPTII,,, | Performed by: FAMILY MEDICINE

## 2023-02-27 PROCEDURE — 1126F AMNT PAIN NOTED NONE PRSNT: CPT | Mod: CPTII,,, | Performed by: FAMILY MEDICINE

## 2023-02-27 PROCEDURE — 36415 COLL VENOUS BLD VENIPUNCTURE: CPT | Performed by: FAMILY MEDICINE

## 2023-02-27 PROCEDURE — 3078F DIAST BP <80 MM HG: CPT | Mod: CPTII,,, | Performed by: FAMILY MEDICINE

## 2023-02-27 PROCEDURE — 3077F SYST BP >= 140 MM HG: CPT | Mod: CPTII,,, | Performed by: FAMILY MEDICINE

## 2023-02-27 PROCEDURE — 80053 COMPREHEN METABOLIC PANEL: CPT | Performed by: FAMILY MEDICINE

## 2023-02-27 PROCEDURE — 3008F BODY MASS INDEX DOCD: CPT | Mod: CPTII,,, | Performed by: FAMILY MEDICINE

## 2023-02-27 PROCEDURE — 3008F PR BODY MASS INDEX (BMI) DOCUMENTED: ICD-10-PCS | Mod: CPTII,,, | Performed by: FAMILY MEDICINE

## 2023-02-27 PROCEDURE — 1159F MED LIST DOCD IN RCRD: CPT | Mod: CPTII,,, | Performed by: FAMILY MEDICINE

## 2023-02-27 PROCEDURE — 1126F PR PAIN SEVERITY QUANTIFIED, NO PAIN PRESENT: ICD-10-PCS | Mod: CPTII,,, | Performed by: FAMILY MEDICINE

## 2023-02-27 RX ORDER — FUROSEMIDE 20 MG/1
20 TABLET ORAL DAILY
Qty: 30 TABLET | Refills: 2 | Status: SHIPPED | OUTPATIENT
Start: 2023-02-27 | End: 2023-03-16 | Stop reason: SDUPTHER

## 2023-02-27 NOTE — PROGRESS NOTES
"Patient Name: Neris Contreras   : 1950  MRN: [id number]     SUBJECTIVE:  Neris Contreras is a 72 y.o. female here for Follow-up (Patient is here for a follow up. )  .    HPI  PMHx of CKD, questionable Diabetes, HTN, chronic constipation, hx of CVA about 20 years ago "mini stroke", here for routine follow up.  Hemoglobin A1c   Date Value Ref Range Status   2022 5.0 <=7.0 % Final   2021 4.8 <<=7.0 % Final   2021 5.1 <<=7.0 % Final   Off of meds for questionable diabetes. Used to be on metformin/glipizide.   Watching diet.    Regarding HTN   Multiple drugs and having underlying CKD. Pt on benzepril 40, amlodipine 10 mg, hydrochlorothiazide 12.5 mg, furosemide 20 mg qd, hydralazine 25 mg BID  Not checking bp at home. Elevated in the office, asymptomatic. Has not had her hydrochlorothiazide.  Not seeing a nephrologist. Not even aware she had chronic kidney diease. Saw nephrology once years ago and was told it was probably dehydration. Aware she needs to avoid NSAIDs.  Used to see cardiology years ago.  States she had dexa scan last year. Will need records    ALLERGIES:   Review of patient's allergies indicates:   Allergen Reactions    Iodine Hives     topical    Iodine and iodide containing products     Shellfish containing products      Other reaction(s): whelps/ itching         ROS:  Review of Systems   Constitutional:  Negative for chills, fever and weight loss.   HENT:  Negative for congestion, ear pain and sore throat.    Eyes:  Negative for blurred vision and pain.   Respiratory:  Negative for cough, shortness of breath and wheezing.    Cardiovascular:  Negative for chest pain, palpitations, leg swelling and PND.   Gastrointestinal:  Negative for abdominal pain, blood in stool, constipation, diarrhea, nausea and vomiting.   Genitourinary:  Negative for dysuria, flank pain and hematuria.   Musculoskeletal:  Negative for myalgias.   Skin:  Negative for rash.   Neurological:  Negative for dizziness, " "focal weakness and headaches.   Psychiatric/Behavioral:  Negative for depression and substance abuse. The patient is not nervous/anxious.        OBJECTIVE:  Vital signs  Vitals:    02/27/23 1316   BP: (!) 151/78   Pulse: (!) 50   Resp: 18   Temp: 97.9 °F (36.6 °C)   TempSrc: Oral   SpO2: 100%   Weight: 81.6 kg (180 lb)   Height: 5' 6" (1.676 m)      Body mass index is 29.05 kg/m².    PHYSICAL EXAM:   Physical Exam  Vitals reviewed.   Constitutional:       General: She is not in acute distress.     Appearance: Normal appearance. She is not ill-appearing.   HENT:      Head: Normocephalic and atraumatic.      Right Ear: External ear normal.      Left Ear: External ear normal.      Nose: Nose normal. No rhinorrhea.      Mouth/Throat:      Mouth: Mucous membranes are moist.   Eyes:      General: No scleral icterus.        Right eye: No discharge.         Left eye: No discharge.      Conjunctiva/sclera: Conjunctivae normal.      Pupils: Pupils are equal, round, and reactive to light.   Cardiovascular:      Rate and Rhythm: Normal rate and regular rhythm.      Heart sounds: No murmur heard.  Pulmonary:      Effort: Pulmonary effort is normal. No respiratory distress.      Breath sounds: No wheezing, rhonchi or rales.   Abdominal:      General: Bowel sounds are normal. There is no distension.      Palpations: Abdomen is soft.      Tenderness: There is no abdominal tenderness.   Musculoskeletal:         General: No swelling. Normal range of motion.      Cervical back: Normal range of motion and neck supple. No rigidity or tenderness.      Right lower leg: No edema.      Left lower leg: No edema.   Skin:     General: Skin is warm.      Coloration: Skin is not pale.      Findings: No rash.   Neurological:      General: No focal deficit present.      Mental Status: She is alert and oriented to person, place, and time.      Sensory: No sensory deficit.      Motor: No weakness.   Psychiatric:         Mood and Affect: Mood normal. "         Behavior: Behavior normal.        ASSESSMENT/PLAN:  Neris was seen today for follow-up.    Diagnoses and all orders for this visit:    Resistant hypertension  -     furosemide (LASIX) 20 MG tablet; Take 1 tablet (20 mg total) by mouth once daily.  -     Ambulatory referral/consult to Cardiology; Future  -     CBC Auto Differential; Future  -     Comprehensive Metabolic Panel; Future  -     Lipid Panel; Future  -     Lipid Panel  -     Comprehensive Metabolic Panel  -     CBC Auto Differential    Bradycardia  -     Ambulatory referral/consult to Cardiology; Future  -     TSH; Future  -     TSH    Chronic kidney disease, unspecified CKD stage  -     Ambulatory referral/consult to Nephrology; Future  -     CBC Auto Differential; Future  -     Comprehensive Metabolic Panel; Future  -     Comprehensive Metabolic Panel  -     CBC Auto Differential    Prediabetes  -     Hemoglobin A1C; Future  -     Hemoglobin A1C      PLAN  - resistant hypertension with underlying CKD. Reviewing labs, there is hypercalcemia as well. Advised patient to stop taking the 12.5 mg of hydrochlorothiazide as it becomes ineffective with decreased renal function and at one point pt had GFR 41. Repeat labs.  Also, patient seems to be asymptomatic bradycardic but new for the past 6 months and not on any beta blockers. Will refer to cardiology. Can increase hydralazine to 50 mg BID if bp >140/90 at home.  Spironolactone a good option for resistant hypertension, but will defer to specialists for now and/or discuss next visit.  - CKD. Further eval with nephrology. Avoid NSAIDs. Recheck labs. Defer imaging and further labs to nephro.          Previous medical history/lab work/radiology reviewed and considered during medical management decisions.   Medication list reviewed and medication reconciliation performed.  Patient was provided  and care about his/her current diagnosis (es) and medications including risk/benefit and side  effects/adverse events, over the counter medication uses/doses, home self-care and contact precautions,  and red flags and indications for when to seek immediate medical attention.   Patient was advised to continue compliance with current medication list and medical recommendations.  Recommended/ Advised continued compliance with recommended eating habits/ diets for medical conditions and exercise 150 minutes/ week (if possible) for medical condition (s).        RESULTS:  Recent Results (from the past 1008 hour(s))   Cologuard Screening (Multitarget Stool DNA)    Collection Time: 02/15/23  3:49 AM    Specimen: Rectum; Stool   Result Value Ref Range    Cologuard Result Negative Negative         Follow Up:  Follow up in  2 weeks for htn if she has not seen any of the specialists yet        This note was created with the assistance of a voice recognition software or phone dictation. There may be transcription errors as a result of using this technology however minimal. Effort has been made to assure accuracy of transcription but any obvious errors or omissions should be clarified with the author of the document

## 2023-02-27 NOTE — PATIENT INSTRUCTIONS
Stop taking hydrochlorothiazide  Can take 2 tablets of Hydralazine 25 mg twice a day if blood pressure >140/90

## 2023-02-28 PROBLEM — R73.03 PREDIABETES: Status: ACTIVE | Noted: 2023-02-28

## 2023-02-28 PROBLEM — I1A.0 RESISTANT HYPERTENSION: Status: ACTIVE | Noted: 2023-02-28

## 2023-02-28 PROBLEM — N18.9 CHRONIC KIDNEY DISEASE: Status: ACTIVE | Noted: 2023-02-28

## 2023-02-28 PROBLEM — R00.1 BRADYCARDIA: Status: ACTIVE | Noted: 2023-02-28

## 2023-03-06 ENCOUNTER — HOSPITAL ENCOUNTER (EMERGENCY)
Facility: HOSPITAL | Age: 73
Discharge: HOME | End: 2023-03-07
Attending: EMERGENCY MEDICINE
Payer: MEDICARE

## 2023-03-06 ENCOUNTER — NURSE TRIAGE (OUTPATIENT)
Dept: ADMINISTRATIVE | Facility: CLINIC | Age: 73
End: 2023-03-06
Payer: MEDICARE

## 2023-03-06 DIAGNOSIS — R06.02 SOB (SHORTNESS OF BREATH): Primary | ICD-10-CM

## 2023-03-06 LAB
ALBUMIN SERPL-MCNC: 4.2 G/DL (ref 3.4–4.8)
ALBUMIN/GLOB SERPL: 1.3 RATIO (ref 1.1–2)
ALP SERPL-CCNC: 76 UNIT/L (ref 40–150)
ALT SERPL-CCNC: 11 UNIT/L (ref 0–55)
APPEARANCE UR: CLEAR
AST SERPL-CCNC: 16 UNIT/L (ref 5–34)
BASOPHILS # BLD AUTO: 0.03 X10(3)/MCL (ref 0–0.2)
BASOPHILS NFR BLD AUTO: 0.5 %
BILIRUB UR QL STRIP.AUTO: NEGATIVE MG/DL
BILIRUBIN DIRECT+TOT PNL SERPL-MCNC: 0.7 MG/DL
BNP BLD-MCNC: 17.7 PG/ML
BUN SERPL-MCNC: 13.9 MG/DL (ref 9.8–20.1)
CALCIUM SERPL-MCNC: 10.3 MG/DL (ref 8.4–10.2)
CHLORIDE SERPL-SCNC: 109 MMOL/L (ref 98–107)
CK SERPL-CCNC: 213 U/L (ref 29–168)
CO2 SERPL-SCNC: 21 MMOL/L (ref 23–31)
COLOR UR AUTO: YELLOW
CREAT SERPL-MCNC: 1.14 MG/DL (ref 0.55–1.02)
EOSINOPHIL # BLD AUTO: 0.16 X10(3)/MCL (ref 0–0.9)
EOSINOPHIL NFR BLD AUTO: 2.8 %
ERYTHROCYTE [DISTWIDTH] IN BLOOD BY AUTOMATED COUNT: 13.1 % (ref 11.5–17)
GFR SERPLBLD CREATININE-BSD FMLA CKD-EPI: 51 MLS/MIN/1.73/M2
GLOBULIN SER-MCNC: 3.3 GM/DL (ref 2.4–3.5)
GLUCOSE SERPL-MCNC: 118 MG/DL (ref 82–115)
GLUCOSE UR QL STRIP.AUTO: NEGATIVE MG/DL
HCT VFR BLD AUTO: 38 % (ref 37–47)
HGB BLD-MCNC: 11.8 G/DL (ref 12–16)
IMM GRANULOCYTES # BLD AUTO: 0.01 X10(3)/MCL (ref 0–0.04)
IMM GRANULOCYTES NFR BLD AUTO: 0.2 %
KETONES UR QL STRIP.AUTO: NEGATIVE MG/DL
LEUKOCYTE ESTERASE UR QL STRIP.AUTO: NEGATIVE UNIT/L
LYMPHOCYTES # BLD AUTO: 2.49 X10(3)/MCL (ref 0.6–4.6)
LYMPHOCYTES NFR BLD AUTO: 43.5 %
MAGNESIUM SERPL-MCNC: 1.8 MG/DL (ref 1.6–2.6)
MCH RBC QN AUTO: 27.4 PG
MCHC RBC AUTO-ENTMCNC: 31.1 G/DL (ref 33–36)
MCV RBC AUTO: 88.4 FL (ref 80–94)
MONOCYTES # BLD AUTO: 0.48 X10(3)/MCL (ref 0.1–1.3)
MONOCYTES NFR BLD AUTO: 8.4 %
NEUTROPHILS # BLD AUTO: 2.55 X10(3)/MCL (ref 2.1–9.2)
NEUTROPHILS NFR BLD AUTO: 44.6 %
NITRITE UR QL STRIP.AUTO: NEGATIVE
NRBC BLD AUTO-RTO: 0 %
PH UR STRIP.AUTO: 5.5 [PH]
PLATELET # BLD AUTO: 233 X10(3)/MCL (ref 130–400)
PMV BLD AUTO: 10.5 FL (ref 7.4–10.4)
POTASSIUM SERPL-SCNC: 4.2 MMOL/L (ref 3.5–5.1)
PROT SERPL-MCNC: 7.5 GM/DL (ref 5.8–7.6)
PROT UR QL STRIP.AUTO: NEGATIVE MG/DL
RBC # BLD AUTO: 4.3 X10(6)/MCL (ref 4.2–5.4)
RBC UR QL AUTO: NEGATIVE UNIT/L
SODIUM SERPL-SCNC: 138 MMOL/L (ref 136–145)
SP GR UR STRIP.AUTO: 1.02 (ref 1–1.03)
TROPONIN I SERPL-MCNC: 0.01 NG/ML (ref 0–0.04)
TROPONIN I SERPL-MCNC: <0.01 NG/ML (ref 0–0.04)
UROBILINOGEN UR STRIP-ACNC: 0.2 MG/DL
WBC # SPEC AUTO: 5.7 X10(3)/MCL (ref 4.5–11.5)

## 2023-03-06 PROCEDURE — 99285 EMERGENCY DEPT VISIT HI MDM: CPT | Mod: 25

## 2023-03-06 PROCEDURE — 93005 ELECTROCARDIOGRAM TRACING: CPT

## 2023-03-06 PROCEDURE — 84484 ASSAY OF TROPONIN QUANT: CPT | Mod: 91 | Performed by: NURSE PRACTITIONER

## 2023-03-06 PROCEDURE — 93010 ELECTROCARDIOGRAM REPORT: CPT | Mod: ,,, | Performed by: STUDENT IN AN ORGANIZED HEALTH CARE EDUCATION/TRAINING PROGRAM

## 2023-03-06 PROCEDURE — 80053 COMPREHEN METABOLIC PANEL: CPT | Performed by: NURSE PRACTITIONER

## 2023-03-06 PROCEDURE — 83880 ASSAY OF NATRIURETIC PEPTIDE: CPT | Performed by: NURSE PRACTITIONER

## 2023-03-06 PROCEDURE — 83735 ASSAY OF MAGNESIUM: CPT | Performed by: NURSE PRACTITIONER

## 2023-03-06 PROCEDURE — 93010 EKG 12-LEAD: ICD-10-PCS | Mod: ,,, | Performed by: STUDENT IN AN ORGANIZED HEALTH CARE EDUCATION/TRAINING PROGRAM

## 2023-03-06 PROCEDURE — 85025 COMPLETE CBC W/AUTO DIFF WBC: CPT | Performed by: NURSE PRACTITIONER

## 2023-03-06 PROCEDURE — 82550 ASSAY OF CK (CPK): CPT | Performed by: NURSE PRACTITIONER

## 2023-03-06 PROCEDURE — 81001 URINALYSIS AUTO W/SCOPE: CPT | Performed by: NURSE PRACTITIONER

## 2023-03-06 NOTE — FIRST PROVIDER EVALUATION
Medical screening examination initiated.  I have conducted a focused provider triage encounter, findings are as follows:    Brief history of present illness:  71 y/o female who presents with sob since Thursday/Friday. Some mild cp with it as well. No cough. Hx htn.     There were no vitals filed for this visit.    Pertinent physical exam:  alert, nonlabored, ambulatory    Brief workup plan:  ekg, labs, imaging    Preliminary workup initiated; this workup will be continued and followed by the physician or advanced practice provider that is assigned to the patient when roomed.

## 2023-03-06 NOTE — TELEPHONE ENCOUNTER
Patient c/o a new onset of SOB at rest. Advised per protocol to go to the nearest ED now for further evaluation. Patient VU. Advised the patient to call back with any further questions or if symptoms worsen.        Reason for Disposition   MODERATE difficulty breathing (e.g., speaks in phrases, SOB even at rest, pulse 100-120) of new-onset or worse than normal    Additional Information   Negative: SEVERE difficulty breathing (e.g., struggling for each breath, speaks in single words, pulse > 120)   Negative: Breathing stopped and hasn't returned   Negative: Choking on something   Negative: Bluish (or gray) lips or face   Negative: Difficult to awaken or acting confused (e.g., disoriented, slurred speech)   Negative: Passed out (i.e., fainted, collapsed and was not responding)   Negative: Wheezing started suddenly after medicine, an allergic food, or bee sting   Negative: Stridor   Negative: Slow, shallow and weak breathing   Negative: Sounds like a life-threatening emergency to the triager    Protocols used: Breathing Difficulty-A-OH

## 2023-03-07 VITALS
SYSTOLIC BLOOD PRESSURE: 167 MMHG | OXYGEN SATURATION: 95 % | HEIGHT: 66 IN | DIASTOLIC BLOOD PRESSURE: 88 MMHG | WEIGHT: 170 LBS | HEART RATE: 64 BPM | BODY MASS INDEX: 27.32 KG/M2 | RESPIRATION RATE: 18 BRPM | TEMPERATURE: 98 F

## 2023-03-07 LAB
BACTERIA #/AREA URNS AUTO: NORMAL /HPF
RBC #/AREA URNS AUTO: <5 /HPF
SQUAMOUS #/AREA URNS AUTO: <5 /HPF
WBC #/AREA URNS AUTO: <5 /HPF

## 2023-03-07 NOTE — ED PROVIDER NOTES
Encounter Date: 3/6/2023    SCRIBE #1 NOTE: I, Dale Wells, am scribing for, and in the presence of,  Dr. Owen. I have scribed the following portions of the note - the EKG reading. Other sections scribed: HPI, ROS, Physical Exam, MDM, Attending.     History     Chief Complaint   Patient presents with    Shortness of Breath     SOB since 3/2. + Generalized weakness, mild chest pain. Denies cough, congestion. Hx HTN.      73 y/o AAF with history of HTN, HLD, DM, CVA, and sleep apnea presents to ED for constant SOB and intermittent mid-sternal chest pain onset about a week ago.  Pt says the chest pain fluctuates in intensity, and she has not noticed any exacerbating factors for her symptoms.  She does not use any inhalers and does not smoke tobacco.  Pt denies cough, congestion or leg swelling.    The history is provided by the patient.   Shortness of Breath  This is a new problem. The problem occurs continuously.Episode onset: about a week ago. The problem has not changed since onset.Associated symptoms include chest pain. Pertinent negatives include no cough and no leg swelling.   Review of patient's allergies indicates:   Allergen Reactions    Iodine Hives     topical    Iodine and iodide containing products     Shellfish containing products      Other reaction(s): whelps/ itching     Past Medical History:   Diagnosis Date    CVA (cerebral vascular accident)     Depression     Diabetes mellitus     HLD (hyperlipidemia)     Hypertension     Pulmonary HTN     Sleep apnea      Past Surgical History:   Procedure Laterality Date    CARDIAC CATHETERIZATION      CARDIAC SURGERY      EYE SURGERY      LEG SURGERY Right     OPEN REDUCTION AND INTERNAL FIXATION (ORIF) OF INJURY OF ELBOW      REPAIR OF EYELID Left 5/13/2022    Procedure: REPAIR, EYELID;  Surgeon: Isaak Wang MD;  Location: [facility];  Service: ENT;  Laterality: Left;     No family history on file.  Social History     Tobacco Use    Smoking status: Never     Smokeless tobacco: Never   Substance Use Topics    Alcohol use: Yes    Drug use: Never     Review of Systems   HENT:  Negative for congestion.    Respiratory:  Positive for shortness of breath. Negative for cough.    Cardiovascular:  Positive for chest pain. Negative for leg swelling.     Physical Exam     Initial Vitals [03/06/23 1304]   BP Pulse Resp Temp SpO2   (!) 161/91 64 20 97.7 °F (36.5 °C) 97 %      MAP       --         Physical Exam    Nursing note and vitals reviewed.  Constitutional: She appears well-developed and well-nourished. She is not diaphoretic. No distress.   HENT:   Head: Normocephalic and atraumatic.   Right Ear: External ear normal.   Left Ear: External ear normal.   Nose: Nose normal.   Eyes: Conjunctivae are normal.   Neck: Neck supple.   Cardiovascular:  Normal rate, regular rhythm and normal heart sounds.           No murmur heard.  Pulmonary/Chest: Breath sounds normal. No respiratory distress.   Abdominal: She exhibits no distension.   Musculoskeletal:      Cervical back: Neck supple.     Neurological: She is alert and oriented to person, place, and time. GCS eye subscore is 4. GCS verbal subscore is 5. GCS motor subscore is 6.   Skin: Skin is warm.   Psychiatric: She has a normal mood and affect.       ED Course   Procedures  Labs Reviewed   CBC WITH DIFFERENTIAL - Abnormal; Notable for the following components:       Result Value    Hgb 11.8 (*)     MCHC 31.1 (*)     MPV 10.5 (*)     All other components within normal limits   COMPREHENSIVE METABOLIC PANEL - Abnormal; Notable for the following components:    Chloride 109 (*)     Carbon Dioxide 21 (*)     Glucose Level 118 (*)     Creatinine 1.14 (*)     Calcium Level Total 10.3 (*)     All other components within normal limits   CK - Abnormal; Notable for the following components:    Creatine Kinase 213 (*)     All other components within normal limits   TROPONIN I - Normal   B-TYPE NATRIURETIC PEPTIDE - Normal   MAGNESIUM - Normal    URINALYSIS, REFLEX TO URINE CULTURE - Normal   TROPONIN I - Normal   URINALYSIS, MICROSCOPIC     EKG Readings: (Independently Interpreted)   Initial Reading: No STEMI. Rhythm: Normal Sinus Rhythm. Heart Rate: 73. Ectopy: No Ectopy. Conduction: Normal. ST Segments: Normal ST Segments. T Waves: Normal. Axis: Normal. Clinical Impression: Left Ventricular Hypertrophy (LDH)   1310 3/6/2023     Imaging Results              X-Ray Chest PA And Lateral (Final result)  Result time 03/06/23 13:31:53      Final result by Kvng Salazar MD (03/06/23 13:31:53)                   Impression:      No acute cardiopulmonary process identified.      Electronically signed by: Kvng Salazar  Date:    03/06/2023  Time:    13:31               Narrative:    EXAMINATION:  XR CHEST PA AND LATERAL    CLINICAL HISTORY:  sob;    TECHNIQUE:  Two-view    COMPARISON:  July 28, 2022.    FINDINGS:  Cardiopericardial silhouette is within normal limits. Lungs are without dense focal or segmental consolidation, congestion, pleural effusion or pneumothorax.                                    X-Rays:   Independently Interpreted Readings:   Chest X-Ray: Normal heart size.  No infiltrates.  No acute abnormalities.   Medications - No data to display           Scribe Attestation:   Scribe #1: I performed the above scribed service and the documentation accurately describes the services I performed. I attest to the accuracy of the note.    Attending Attestation:           Physician Attestation for Scribe:  Physician Attestation Statement for Scribe #1: I, reviewed documentation, as scribed by Dale Wells in my presence, and it is both accurate and complete.         Medical Decision Making  Initial evaluation: well appearing, no respiratory distress, speaking in full sentences, clear lungs, no peripheral edema     Differential diagnoses include, but are not limited to: pneumonia, asthma/COPD, ACS, CHF, anxiety, viral syndrome        Problems Addressed:  SOB  (shortness of breath): acute illness or injury that poses a threat to life or bodily functions     Details: labs normal  troponin negative x 2  cxr clear  ekg unremarkable    Amount and/or Complexity of Data Reviewed  External Data Reviewed: notes.     Details: see ED course  Labs: ordered. Decision-making details documented in ED Course.  Radiology: ordered and independent interpretation performed. Decision-making details documented in ED Course.  ECG/medicine tests: ordered and independent interpretation performed. Decision-making details documented in ED Course.    Risk  Prescription drug management.            ED Course as of 03/07/23 0159   Tue Mar 07, 2023   0013 Reviewed PCP office visit from 2/27/23- asymptomatic resistant hypertension and stopped HCTZ and increase Hydralazine, referring to renal for CKD  [KM]   0139 Discussed results of negative workup with patient. Advised her to follow up with her PCP and return if symptoms worsen [KM]      ED Course User Index  [KM] Jennifer Owen MD                   Clinical Impression:   Final diagnoses:  [R06.02] SOB (shortness of breath) (Primary)        ED Disposition Condition    Discharge Stable          ED Prescriptions    None       Follow-up Information       Follow up With Specialties Details Why Contact Info    Ariadne Fritz MD Family Medicine Schedule an appointment as soon as possible for a visit   [address] [facility] [address]  [phone]      [facility] - Emergency Dept Emergency Medicine  As needed, If symptoms worsen [address] [facility] [id number] [phone]             Jennifer Owen MD  03/07/23 0159

## 2023-03-09 ENCOUNTER — OFFICE VISIT (OUTPATIENT)
Dept: CARDIOLOGY | Facility: CLINIC | Age: 73
End: 2023-03-09
Payer: MEDICARE

## 2023-03-09 VITALS
DIASTOLIC BLOOD PRESSURE: 72 MMHG | BODY MASS INDEX: 27.46 KG/M2 | HEIGHT: 66 IN | RESPIRATION RATE: 20 BRPM | HEART RATE: 54 BPM | WEIGHT: 170.88 LBS | OXYGEN SATURATION: 98 % | TEMPERATURE: 98 F | SYSTOLIC BLOOD PRESSURE: 135 MMHG

## 2023-03-09 DIAGNOSIS — N18.9 CHRONIC KIDNEY DISEASE, UNSPECIFIED CKD STAGE: Primary | ICD-10-CM

## 2023-03-09 DIAGNOSIS — G45.9 TIA (TRANSIENT ISCHEMIC ATTACK): ICD-10-CM

## 2023-03-09 DIAGNOSIS — R00.1 BRADYCARDIA: ICD-10-CM

## 2023-03-09 DIAGNOSIS — I27.20 PULMONARY HYPERTENSION: ICD-10-CM

## 2023-03-09 DIAGNOSIS — I1A.0 RESISTANT HYPERTENSION: ICD-10-CM

## 2023-03-09 DIAGNOSIS — R00.1 SINUS BRADYCARDIA: ICD-10-CM

## 2023-03-09 PROCEDURE — 93005 ELECTROCARDIOGRAM TRACING: CPT

## 2023-03-09 PROCEDURE — 99215 OFFICE O/P EST HI 40 MIN: CPT | Mod: PBBFAC | Performed by: INTERNAL MEDICINE

## 2023-03-09 NOTE — PROGRESS NOTES
[facility]  Cariology Clinic - Established Patient     Cardiology Attending: Dr. Randle  Date of Visit: 3/9/2023  Reason for Visit/Chief Complaint:   Chief Complaint    referral; Hypertension; Chest Pain; Shortness of Breath          History of Present Illness:      Neris Contreras is a 72 y.o. female with a PMH significant for mild pulm HTN (WHO class II), TIA, CARISSA, HTN who presents to cardiology clinic for follow up.  Last seen in April 2022 and was doing well.  Adherent to medications.  Reports new symptom of SOB and BLACKMAN.  Went to ED on 3/6/23 and CXR, BNP, troponin all unremarkable.  She appears comfortable and denies syncope, chest pain, palpitations.  Has a new PCP.    Past Medical History:     Past Medical History:   Diagnosis Date    CVA (cerebral vascular accident)     Depression     Diabetes mellitus     HLD (hyperlipidemia)     Hypertension     Pulmonary HTN     Sleep apnea      Surgical History:     Past Surgical History:   Procedure Laterality Date    CARDIAC CATHETERIZATION      CARDIAC SURGERY      EYE SURGERY      LEG SURGERY Right     OPEN REDUCTION AND INTERNAL FIXATION (ORIF) OF INJURY OF ELBOW      REPAIR OF EYELID Left 5/13/2022    Procedure: REPAIR, EYELID;  Surgeon: Isaak Wang MD;  Location: [facility];  Service: ENT;  Laterality: Left;     Family History:   History reviewed. No pertinent family history.  Social History:     Social History     Tobacco Use    Smoking status: Never    Smokeless tobacco: Never   Substance Use Topics    Alcohol use: Yes     Comment: social    Drug use: Never     Allergies:     Review of patient's allergies indicates:   Allergen Reactions    Iodine Hives     topical    Iodine and iodide containing products     Shellfish containing products      Other reaction(s): whelps/ itching     Medications:     Current Outpatient Medications   Medication Sig Dispense Refill    amLODIPine (NORVASC) 10 MG tablet Take 10 mg by mouth.       atorvastatin (LIPITOR) 20 MG tablet Take 20 mg by mouth.      benazepriL (LOTENSIN) 40 MG tablet   See Instructions, TAKE 1 TABLET BY MOUTH ONCE DAILY, # 90 tab(s), 3 Refill(s), Pharmacy: [facility], 168, cm, Height/Length Dosing, 07/15/21 10:50:00 CDT, 75.6, kg, Weight Dosing, 07/15/21 10:50:00 CDT      clopidogreL (PLAVIX) 75 mg tablet Take 75 mg by mouth.      fluticasone propionate (FLONASE) 50 mcg/actuation nasal spray 1 spray by Each Nostril route daily as needed for Rhinitis.      furosemide (LASIX) 20 MG tablet Take 1 tablet (20 mg total) by mouth once daily. 30 tablet 2    hydrALAZINE (APRESOLINE) 25 MG tablet   See Instructions, TAKE 1 TABLET BY MOUTH  TWICE DAILY, # 180 tab(s), 3 Refill(s), Pharmacy: [facility], 168, cm, Height/Length Dosing, 07/15/21 10:50:00 CDT, 75.6, kg, Weight Dosing, 07/15/21 10:50:00 CDT      linaCLOtide (LINZESS) 145 mcg Cap capsule Take 1 capsule (145 mcg total) by mouth before breakfast. 90 capsule 1    potassium chloride (K-TAB) 20 mEq Take 20 mEq by mouth.      cefdinir (OMNICEF) 300 MG capsule Take 300 mg by mouth every 12 (twelve) hours.       No current facility-administered medications for this visit.       I have reviewed and updated the patient's medications, allergies, past medical history, surgical history, social history and family history as needed.    Review of Systems:     Review of Systems   Constitutional:  Negative for chills, fever and malaise/fatigue.   HENT:  Negative for hearing loss and sore throat.    Eyes:  Negative for blurred vision, pain and redness.   Respiratory:  Positive for shortness of breath. Negative for cough and wheezing.    Cardiovascular:  Negative for chest pain, palpitations, orthopnea, leg swelling and PND.   Gastrointestinal:  Negative for abdominal pain, constipation, diarrhea, nausea and vomiting.   Musculoskeletal:  Negative for back pain, joint pain and myalgias.   Neurological:  Negative for dizziness, sensory  "change and focal weakness.    Objective:     Wt Readings from Last 3 Encounters:   03/09/23 77.5 kg (170 lb 13.7 oz)   03/06/23 77.1 kg (170 lb)   02/27/23 81.6 kg (180 lb)     Temp Readings from Last 3 Encounters:   03/09/23 98.1 °F (36.7 °C) (Oral)   03/06/23 97.7 °F (36.5 °C) (Oral)   02/27/23 97.9 °F (36.6 °C) (Oral)     BP Readings from Last 3 Encounters:   03/09/23 135/72   03/07/23 (!) 167/88   02/27/23 (!) 151/78     Pulse Readings from Last 3 Encounters:   03/09/23 (!) 54   03/07/23 64   02/27/23 (!) 50       Vitals:    03/09/23 1250   BP: 135/72   BP Location: Left arm   Patient Position: Sitting   BP Method: Large (Automatic)   Pulse: (!) 54   Resp: 20   Temp: 98.1 °F (36.7 °C)   TempSrc: Oral   SpO2: 98%   Weight: 77.5 kg (170 lb 13.7 oz)   Height: 5' 6" (1.676 m)     Body mass index is 27.58 kg/m².    Physical Exam     Labs:   I have reviewed the following labs below:      Lab Results   Component Value Date    WBC 5.7 03/06/2023    HGB 11.8 (L) 03/06/2023    HCT 38.0 03/06/2023     03/06/2023    MCV 88.4 03/06/2023    RDW 13.1 03/06/2023     Lab Results   Component Value Date     03/06/2023    K 4.2 03/06/2023    CO2 21 (L) 03/06/2023    BUN 13.9 03/06/2023    CALCIUM 10.3 (H) 03/06/2023    MG 1.80 03/06/2023     Lab Results   Component Value Date    ALBUMIN 4.2 03/06/2023    BILITOT 0.7 03/06/2023    AST 16 03/06/2023    ALKPHOS 76 03/06/2023    ALT 11 03/06/2023     Cholesterol Total   Date Value Ref Range Status   02/27/2023 128 <=200 mg/dL Final     HDL Cholesterol   Date Value Ref Range Status   02/27/2023 47 35 - 60 mg/dL Final     LDL Cholesterol   Date Value Ref Range Status   02/27/2023 68.00 50.00 - 140.00 mg/dL Final     Triglyceride   Date Value Ref Range Status   02/27/2023 63 37 - 140 mg/dL Final     Lab Results   Component Value Date    HGBA1C 5.4 02/27/2023    HGBA1C 5.0 07/28/2022    HGBA1C 4.8 12/04/2021    CREATININE 1.14 (H) 03/06/2023     Lab Results   Component Value " Date    TSH 0.783 02/27/2023     Lab Results   Component Value Date    [id number] 733 12/09/2020    FOLATE 9.4 10/19/2020     Lab Results   Component Value Date    INR 1.0 04/28/2022    PROTIME 13.5 04/28/2022      Lab Results   Component Value Date    TROPONINI <0.010 03/06/2023    TROPONINI 0.015 03/06/2023    TROPONINI 0.005 08/03/2020    TROPONINI 0.010 07/30/2020    BNP 17.7 03/06/2023    BNP 24 08/03/2020    BNP 16.1 08/03/2020     Cardiac Studies/Imaging:   I have reviewed the following studies below:      EKG (3/9/2023):  Sinus bradycardia, LVH    TTE (2019):  Summary  Borderline Concentric left ventricular hypertrophy.  Left ventricular ejection fraction is measured at approximately 60%.    LHC/Cors (2017):  No CAD (no report found)    Assessment/Plan:   72 y.o. female with the following medical problems:    #pulmonary hypertension  - continue daily lasix  #CVA  - continue plavix, statin  #HFpEF  - continue lasix, BP control  #HTN  - continue current regimen, BP reasonably controlled, have room to increase  #sinus bradycardia  - 14 day holter, asymptomatic      Return to clinic in 6 months.    Lazarus Mcelroy MD  [employer] Cardiology Fellow, PGY-4  03/09/2023 1:01 PM

## 2023-03-09 NOTE — PROGRESS NOTES
Cardiology Attending    I have discussed Neris Contreras including the patient's symptoms, findings, and management plan with the cardiology fellow.  Please see the Cardiology Note for details.

## 2023-03-16 ENCOUNTER — OFFICE VISIT (OUTPATIENT)
Dept: FAMILY MEDICINE | Facility: CLINIC | Age: 73
End: 2023-03-16
Payer: MEDICARE

## 2023-03-16 VITALS
RESPIRATION RATE: 18 BRPM | WEIGHT: 178 LBS | HEIGHT: 66 IN | SYSTOLIC BLOOD PRESSURE: 154 MMHG | TEMPERATURE: 98 F | DIASTOLIC BLOOD PRESSURE: 81 MMHG | OXYGEN SATURATION: 100 % | BODY MASS INDEX: 28.61 KG/M2 | HEART RATE: 55 BPM

## 2023-03-16 DIAGNOSIS — Z78.0 POSTMENOPAUSAL: ICD-10-CM

## 2023-03-16 DIAGNOSIS — E83.52 HYPERCALCEMIA: ICD-10-CM

## 2023-03-16 DIAGNOSIS — Z11.59 ENCOUNTER FOR HEPATITIS C SCREENING TEST FOR LOW RISK PATIENT: ICD-10-CM

## 2023-03-16 DIAGNOSIS — I1A.0 RESISTANT HYPERTENSION: Primary | ICD-10-CM

## 2023-03-16 PROCEDURE — 4010F ACE/ARB THERAPY RXD/TAKEN: CPT | Mod: CPTII,,, | Performed by: FAMILY MEDICINE

## 2023-03-16 PROCEDURE — 3288F PR FALLS RISK ASSESSMENT DOCUMENTED: ICD-10-PCS | Mod: CPTII,,, | Performed by: FAMILY MEDICINE

## 2023-03-16 PROCEDURE — 1101F PT FALLS ASSESS-DOCD LE1/YR: CPT | Mod: CPTII,,, | Performed by: FAMILY MEDICINE

## 2023-03-16 PROCEDURE — 3077F SYST BP >= 140 MM HG: CPT | Mod: CPTII,,, | Performed by: FAMILY MEDICINE

## 2023-03-16 PROCEDURE — 3079F PR MOST RECENT DIASTOLIC BLOOD PRESSURE 80-89 MM HG: ICD-10-PCS | Mod: CPTII,,, | Performed by: FAMILY MEDICINE

## 2023-03-16 PROCEDURE — 3079F DIAST BP 80-89 MM HG: CPT | Mod: CPTII,,, | Performed by: FAMILY MEDICINE

## 2023-03-16 PROCEDURE — 1159F PR MEDICATION LIST DOCUMENTED IN MEDICAL RECORD: ICD-10-PCS | Mod: CPTII,,, | Performed by: FAMILY MEDICINE

## 2023-03-16 PROCEDURE — 1160F PR REVIEW ALL MEDS BY PRESCRIBER/CLIN PHARMACIST DOCUMENTED: ICD-10-PCS | Mod: CPTII,,, | Performed by: FAMILY MEDICINE

## 2023-03-16 PROCEDURE — 1126F AMNT PAIN NOTED NONE PRSNT: CPT | Mod: CPTII,,, | Performed by: FAMILY MEDICINE

## 2023-03-16 PROCEDURE — 3288F FALL RISK ASSESSMENT DOCD: CPT | Mod: CPTII,,, | Performed by: FAMILY MEDICINE

## 2023-03-16 PROCEDURE — 99214 PR OFFICE/OUTPT VISIT, EST, LEVL IV, 30-39 MIN: ICD-10-PCS | Mod: S$PBB,,, | Performed by: FAMILY MEDICINE

## 2023-03-16 PROCEDURE — 99214 OFFICE O/P EST MOD 30 MIN: CPT | Mod: PBBFAC | Performed by: FAMILY MEDICINE

## 2023-03-16 PROCEDURE — 99214 OFFICE O/P EST MOD 30 MIN: CPT | Mod: S$PBB,,, | Performed by: FAMILY MEDICINE

## 2023-03-16 PROCEDURE — 3008F BODY MASS INDEX DOCD: CPT | Mod: CPTII,,, | Performed by: FAMILY MEDICINE

## 2023-03-16 PROCEDURE — 3008F PR BODY MASS INDEX (BMI) DOCUMENTED: ICD-10-PCS | Mod: CPTII,,, | Performed by: FAMILY MEDICINE

## 2023-03-16 PROCEDURE — 1160F RVW MEDS BY RX/DR IN RCRD: CPT | Mod: CPTII,,, | Performed by: FAMILY MEDICINE

## 2023-03-16 PROCEDURE — 1126F PR PAIN SEVERITY QUANTIFIED, NO PAIN PRESENT: ICD-10-PCS | Mod: CPTII,,, | Performed by: FAMILY MEDICINE

## 2023-03-16 PROCEDURE — 4010F PR ACE/ARB THEARPY RXD/TAKEN: ICD-10-PCS | Mod: CPTII,,, | Performed by: FAMILY MEDICINE

## 2023-03-16 PROCEDURE — 1159F MED LIST DOCD IN RCRD: CPT | Mod: CPTII,,, | Performed by: FAMILY MEDICINE

## 2023-03-16 PROCEDURE — 3077F PR MOST RECENT SYSTOLIC BLOOD PRESSURE >= 140 MM HG: ICD-10-PCS | Mod: CPTII,,, | Performed by: FAMILY MEDICINE

## 2023-03-16 PROCEDURE — 1101F PR PT FALLS ASSESS DOC 0-1 FALLS W/OUT INJ PAST YR: ICD-10-PCS | Mod: CPTII,,, | Performed by: FAMILY MEDICINE

## 2023-03-16 RX ORDER — FUROSEMIDE 40 MG/1
40 TABLET ORAL DAILY
Qty: 30 TABLET | Refills: 2 | Status: SHIPPED | OUTPATIENT
Start: 2023-03-16 | End: 2023-06-28 | Stop reason: SDUPTHER

## 2023-03-16 NOTE — PROGRESS NOTES
Patient Name: Neris Contreras   : 1950  MRN: [id number]     SUBJECTIVE:  Neris Contreras is a 72 y.o. female here for Follow-up (ER follow up)  .    HPI  Went to ER 10 days ago for chest pain and shortness of breath.  Blood work remarkable for CKD and hypercalcemia.  Of note, patient was off of hydrochlorothiazide for 2 weeks at least at that point. Chest x-ray was normal.  EKG without any ischemic changes.  ProBNP, troponin unremarkable.  Patient going to have a 14 day Holter monitor for the sinus bradycardia.  She was seen from Cardiology 3 days later.  Regarding CKD, patient was referred to Nephrology, but did not meet criteria.  She has been stable.  Normal urine output.  Aware to avoid NSAIDs.  Regarding hypertension- on benzepril 40, amlodipine 10 mg, , furosemide 20 mg qd, hydralazine 25 mg BID.  Blood pressure elevated today.  Last visit patient was advised to increase hydralazine to 50 mg b.i.d. if blood pressure was more than 140/90 at home. At home 140'/70.  Did take 2 tab of hydralazine at that time.  Does admit to have leg swelling frequently.  Patient aware she does preserved EF heart failure and pulmonary hypertension.  On Lasix 20 mg.    ALLERGIES:   Review of patient's allergies indicates:   Allergen Reactions    Iodine Hives     topical    Iodine and iodide containing products     Shellfish containing products      Other reaction(s): whelps/ itching         ROS:  Review of Systems   Constitutional:  Negative for chills, fever and weight loss.   HENT:  Negative for congestion, ear pain and sore throat.    Eyes:  Negative for blurred vision and pain.   Respiratory:  Negative for cough, shortness of breath and wheezing.    Cardiovascular:  Negative for chest pain, palpitations, leg swelling and PND.   Gastrointestinal:  Negative for abdominal pain, blood in stool, constipation, diarrhea, nausea and vomiting.   Genitourinary:  Negative for dysuria, flank pain and hematuria.   Musculoskeletal:  Negative  "for falls and myalgias.   Skin:  Negative for rash.   Neurological:  Negative for dizziness, focal weakness and headaches.   Psychiatric/Behavioral:  Negative for depression and substance abuse. The patient is not nervous/anxious.        OBJECTIVE:  Vital signs  Vitals:    03/16/23 1415   BP: (!) 154/81   Pulse: (!) 55   Resp: 18   Temp: 97.8 °F (36.6 °C)   TempSrc: Oral   SpO2: 100%   Weight: 80.7 kg (178 lb)   Height: 5' 6" (1.676 m)      Body mass index is 28.73 kg/m².    PHYSICAL EXAM:   Physical Exam  Vitals reviewed.   Constitutional:       General: She is not in acute distress.     Appearance: Normal appearance. She is not ill-appearing.   HENT:      Head: Normocephalic and atraumatic.      Right Ear: External ear normal.      Left Ear: External ear normal.      Nose: Nose normal. No rhinorrhea.      Mouth/Throat:      Mouth: Mucous membranes are moist.   Eyes:      General: No scleral icterus.        Right eye: No discharge.         Left eye: No discharge.      Conjunctiva/sclera: Conjunctivae normal.      Pupils: Pupils are equal, round, and reactive to light.   Cardiovascular:      Rate and Rhythm: Regular rhythm. Bradycardia present.      Heart sounds: No murmur heard.  Pulmonary:      Effort: Pulmonary effort is normal. No respiratory distress.      Breath sounds: No wheezing, rhonchi or rales.   Abdominal:      General: Bowel sounds are normal. There is no distension.      Palpations: Abdomen is soft.      Tenderness: There is no abdominal tenderness.   Musculoskeletal:         General: No swelling. Normal range of motion.      Cervical back: Normal range of motion and neck supple. No rigidity or tenderness.      Right lower leg: Edema (1+) present.      Left lower leg: Edema (1+) present.   Skin:     General: Skin is warm.      Coloration: Skin is not pale.      Findings: No rash.   Neurological:      General: No focal deficit present.      Mental Status: She is alert and oriented to person, place, and " time.      Sensory: No sensory deficit.      Motor: No weakness.   Psychiatric:         Mood and Affect: Mood normal.         Behavior: Behavior normal.        ASSESSMENT/PLAN:  Neris was seen today for follow-up.    Diagnoses and all orders for this visit:    Resistant hypertension  -     furosemide (LASIX) 40 MG tablet; Take 1 tablet (40 mg total) by mouth once daily.    Hypercalcemia  -     PTH, Intact; Future  -     Vitamin D; Future  -     Comprehensive Metabolic Panel; Future    Postmenopausal  -     DXA Bone Density Axial Skeleton 1 or more sites; Future    Encounter for hepatitis C screening test for low risk patient  -     Hepatitis C Antibody; Future         Plan  -hypertension not at goal control.  Advised patient to continue same management, except for Lasix will be increased to 40 mg given the underline CHF and pulmonary hypertension with increased leg swelling lately.  Patient has CKD so will have to recheck CMP in 2 weeks.  Continue to follow with Cardiology.  -persistent hypercalcemia.  Recheck and add vitamin-D, PTH.  Lasix should help improve hypercalcemia.  -DEXA scan ordered.  Hepatitis-C screening.    Previous medical history/lab work/radiology reviewed and considered during medical management decisions.   Medication list reviewed and medication reconciliation performed.  Patient was provided  and care about his/her current diagnosis (es) and medications including risk/benefit and side effects/adverse events, over the counter medication uses/doses, home self-care and contact precautions,  and red flags and indications for when to seek immediate medical attention.   Patient was advised to continue compliance with current medication list and medical recommendations.  Recommended/ Advised continued compliance with recommended eating habits/ diets for medical conditions and exercise 150 minutes/ week (if possible) for medical condition (s).        RESULTS:  Recent Results (from the past 1008  hour(s))   Cologuard Screening (Multitarget Stool DNA)    Collection Time: 02/15/23  3:49 AM    Specimen: Rectum; Stool   Result Value Ref Range    Cologuard Result Negative Negative   TSH    Collection Time: 02/27/23  2:08 PM   Result Value Ref Range    Thyroid Stimulating Hormone 0.783 0.350 - 4.940 uIU/mL   Hemoglobin A1C    Collection Time: 02/27/23  2:08 PM   Result Value Ref Range    Hemoglobin A1c 5.4 <=7.0 %    Estimated Average Glucose 108.3 mg/dL   Lipid Panel    Collection Time: 02/27/23  2:08 PM   Result Value Ref Range    Cholesterol Total 128 <=200 mg/dL    HDL Cholesterol 47 35 - 60 mg/dL    Triglyceride 63 37 - 140 mg/dL    Cholesterol/HDL Ratio 3 0 - 5    Very Low Density Lipoprotein 13     LDL Cholesterol 68.00 50.00 - 140.00 mg/dL   Comprehensive Metabolic Panel    Collection Time: 02/27/23  2:08 PM   Result Value Ref Range    Sodium Level 142 136 - 145 mmol/L    Potassium Level 3.7 3.5 - 5.1 mmol/L    Chloride 107 98 - 107 mmol/L    Carbon Dioxide 26 23 - 31 mmol/L    Glucose Level 88 82 - 115 mg/dL    Blood Urea Nitrogen 16.4 9.8 - 20.1 mg/dL    Creatinine 1.01 0.55 - 1.02 mg/dL    Calcium Level Total 10.2 8.4 - 10.2 mg/dL    Protein Total 7.3 5.8 - 7.6 gm/dL    Albumin Level 4.2 3.4 - 4.8 g/dL    Globulin 3.1 2.4 - 3.5 gm/dL    Albumin/Globulin Ratio 1.4 1.1 - 2.0 ratio    Bilirubin Total 0.9 <=1.5 mg/dL    Alkaline Phosphatase 72 40 - 150 unit/L    Alanine Aminotransferase 10 0 - 55 unit/L    Aspartate Aminotransferase 15 5 - 34 unit/L    eGFR 59 mls/min/1.73/m2   CBC with Differential    Collection Time: 02/27/23  2:08 PM   Result Value Ref Range    WBC 5.6 4.5 - 11.5 x10(3)/mcL    RBC 3.98 (L) 4.20 - 5.40 x10(6)/mcL    Hgb 11.1 (L) 12.0 - 16.0 g/dL    Hct 35.7 (L) 37.0 - 47.0 %    MCV 89.7 80.0 - 94.0 fL    MCH 27.9 pg    MCHC 31.1 (L) 33.0 - 36.0 g/dL    RDW 12.9 11.5 - 17.0 %    Platelet 229 130 - 400 x10(3)/mcL    MPV 11.2 (H) 7.4 - 10.4 fL    Neut % 47.8 %    Lymph % 40.5 %    Mono % 7.4  %    Eos % 3.6 %    Basophil % 0.5 %    Lymph # 2.25 0.6 - 4.6 x10(3)/mcL    Neut # 2.66 2.1 - 9.2 x10(3)/mcL    Mono # 0.41 0.1 - 1.3 x10(3)/mcL    Eos # 0.20 0 - 0.9 x10(3)/mcL    Baso # 0.03 0 - 0.2 x10(3)/mcL    IG# 0.01 0 - 0.04 x10(3)/mcL    IG% 0.2 %    NRBC% 0.0 %   CBC with Differential    Collection Time: 03/06/23  1:18 PM   Result Value Ref Range    WBC 5.7 4.5 - 11.5 x10(3)/mcL    RBC 4.30 4.20 - 5.40 x10(6)/mcL    Hgb 11.8 (L) 12.0 - 16.0 g/dL    Hct 38.0 37.0 - 47.0 %    MCV 88.4 80.0 - 94.0 fL    MCH 27.4 pg    MCHC 31.1 (L) 33.0 - 36.0 g/dL    RDW 13.1 11.5 - 17.0 %    Platelet 233 130 - 400 x10(3)/mcL    MPV 10.5 (H) 7.4 - 10.4 fL    Neut % 44.6 %    Lymph % 43.5 %    Mono % 8.4 %    Eos % 2.8 %    Basophil % 0.5 %    Lymph # 2.49 0.6 - 4.6 x10(3)/mcL    Neut # 2.55 2.1 - 9.2 x10(3)/mcL    Mono # 0.48 0.1 - 1.3 x10(3)/mcL    Eos # 0.16 0 - 0.9 x10(3)/mcL    Baso # 0.03 0 - 0.2 x10(3)/mcL    IG# 0.01 0 - 0.04 x10(3)/mcL    IG% 0.2 %    NRBC% 0.0 %   Comprehensive Metabolic Panel    Collection Time: 03/06/23  1:18 PM   Result Value Ref Range    Sodium Level 138 136 - 145 mmol/L    Potassium Level 4.2 3.5 - 5.1 mmol/L    Chloride 109 (H) 98 - 107 mmol/L    Carbon Dioxide 21 (L) 23 - 31 mmol/L    Glucose Level 118 (H) 82 - 115 mg/dL    Blood Urea Nitrogen 13.9 9.8 - 20.1 mg/dL    Creatinine 1.14 (H) 0.55 - 1.02 mg/dL    Calcium Level Total 10.3 (H) 8.4 - 10.2 mg/dL    Protein Total 7.5 5.8 - 7.6 gm/dL    Albumin Level 4.2 3.4 - 4.8 g/dL    Globulin 3.3 2.4 - 3.5 gm/dL    Albumin/Globulin Ratio 1.3 1.1 - 2.0 ratio    Bilirubin Total 0.7 <=1.5 mg/dL    Alkaline Phosphatase 76 40 - 150 unit/L    Alanine Aminotransferase 11 0 - 55 unit/L    Aspartate Aminotransferase 16 5 - 34 unit/L    eGFR 51 mls/min/1.73/m2   Troponin I    Collection Time: 03/06/23  1:18 PM   Result Value Ref Range    Troponin-I 0.015 0.000 - 0.045 ng/mL   BNP    Collection Time: 03/06/23  1:18 PM   Result Value Ref Range    Natriuretic  Peptide 17.7 <=100.0 pg/mL   Magnesium    Collection Time: 03/06/23  1:18 PM   Result Value Ref Range    Magnesium Level 1.80 1.60 - 2.60 mg/dL   CK    Collection Time: 03/06/23  1:18 PM   Result Value Ref Range    Creatine Kinase 213 (H) 29 - 168 U/L   Troponin I    Collection Time: 03/06/23  4:44 PM   Result Value Ref Range    Troponin-I <0.010 0.000 - 0.045 ng/mL   Urinalysis, Reflex to Urine Culture    Collection Time: 03/06/23  9:11 PM    Specimen: Urine   Result Value Ref Range    Color, UA Yellow Yellow, Light-Yellow, Dark Yellow, Dari, Straw    Appearance, UA Clear Clear    Specific Gravity, UA 1.025 1.005 - 1.030    pH, UA 5.5 5.0 - 8.5    Protein, UA Negative Negative mg/dL    Glucose, UA Negative Negative, Normal mg/dL    Ketones, UA Negative Negative mg/dL    Blood, UA Negative Negative unit/L    Bilirubin, UA Negative Negative mg/dL    Urobilinogen, UA 0.2 0.2, 1.0, Normal mg/dL    Nitrites, UA Negative Negative    Leukocyte Esterase, UA Negative Negative unit/L   Urinalysis, Microscopic    Collection Time: 03/06/23  9:11 PM   Result Value Ref Range    RBC, UA <5 <=5 /HPF    WBC, UA <5 <=5 /HPF    Squamous Epithelial Cells, UA <5 <=5 /HPF    Bacteria, UA None Seen None Seen, Rare, Occasional /HPF         Follow Up:  No follow-ups on file.     @[email or web address]@    This note was created with the assistance of a voice recognition software or phone dictation. There may be transcription errors as a result of using this technology however minimal. Effort has been made to assure accuracy of transcription but any obvious errors or omissions should be clarified with the author of the document

## 2023-03-24 ENCOUNTER — HOSPITAL ENCOUNTER (OUTPATIENT)
Dept: RADIOLOGY | Facility: HOSPITAL | Age: 73
Discharge: HOME | End: 2023-03-24
Attending: FAMILY MEDICINE
Payer: MEDICARE

## 2023-03-24 DIAGNOSIS — Z78.0 POSTMENOPAUSAL: ICD-10-CM

## 2023-03-24 PROCEDURE — 77080 DXA BONE DENSITY AXIAL: CPT | Mod: TC

## 2023-04-11 ENCOUNTER — LAB VISIT (OUTPATIENT)
Dept: LAB | Facility: HOSPITAL | Age: 73
End: 2023-04-11
Attending: FAMILY MEDICINE
Payer: MEDICARE

## 2023-04-11 DIAGNOSIS — Z11.59 ENCOUNTER FOR HEPATITIS C SCREENING TEST FOR LOW RISK PATIENT: ICD-10-CM

## 2023-04-11 DIAGNOSIS — E83.52 HYPERCALCEMIA: ICD-10-CM

## 2023-04-11 LAB
ALBUMIN SERPL-MCNC: 4.1 G/DL (ref 3.4–4.8)
ALBUMIN/GLOB SERPL: 1.2 RATIO (ref 1.1–2)
ALP SERPL-CCNC: 74 UNIT/L (ref 40–150)
ALT SERPL-CCNC: 12 UNIT/L (ref 0–55)
AST SERPL-CCNC: 15 UNIT/L (ref 5–34)
BILIRUBIN DIRECT+TOT PNL SERPL-MCNC: 0.4 MG/DL
BUN SERPL-MCNC: 13.3 MG/DL (ref 9.8–20.1)
CALCIUM SERPL-MCNC: 10.2 MG/DL (ref 8.4–10.2)
CHLORIDE SERPL-SCNC: 110 MMOL/L (ref 98–107)
CO2 SERPL-SCNC: 28 MMOL/L (ref 23–31)
CREAT SERPL-MCNC: 1.08 MG/DL (ref 0.55–1.02)
DEPRECATED CALCIDIOL+CALCIFEROL SERPL-MC: 19.4 NG/ML (ref 30–80)
GFR SERPLBLD CREATININE-BSD FMLA CKD-EPI: 54 MLS/MIN/1.73/M2
GLOBULIN SER-MCNC: 3.3 GM/DL (ref 2.4–3.5)
GLUCOSE SERPL-MCNC: 119 MG/DL (ref 82–115)
HCV AB SERPL QL IA: NONREACTIVE
POTASSIUM SERPL-SCNC: 3.7 MMOL/L (ref 3.5–5.1)
PROT SERPL-MCNC: 7.4 GM/DL (ref 5.8–7.6)
PTH-INTACT SERPL-MCNC: 124.5 PG/ML (ref 8.7–77)
SODIUM SERPL-SCNC: 143 MMOL/L (ref 136–145)

## 2023-04-11 PROCEDURE — 86803 HEPATITIS C AB TEST: CPT

## 2023-04-11 PROCEDURE — 82306 VITAMIN D 25 HYDROXY: CPT

## 2023-04-11 PROCEDURE — 36415 COLL VENOUS BLD VENIPUNCTURE: CPT

## 2023-04-11 PROCEDURE — 80053 COMPREHEN METABOLIC PANEL: CPT

## 2023-04-11 PROCEDURE — 83970 ASSAY OF PARATHORMONE: CPT

## 2023-04-12 ENCOUNTER — HOSPITAL ENCOUNTER (OUTPATIENT)
Dept: CARDIOLOGY | Facility: HOSPITAL | Age: 73
Discharge: HOME | End: 2023-04-12
Attending: INTERNAL MEDICINE
Payer: MEDICARE

## 2023-04-12 DIAGNOSIS — N18.9 CHRONIC KIDNEY DISEASE, UNSPECIFIED CKD STAGE: ICD-10-CM

## 2023-04-12 DIAGNOSIS — R00.1 SINUS BRADYCARDIA: ICD-10-CM

## 2023-04-12 PROCEDURE — 93229 REMOTE 30 DAY ECG TECH SUPP: CPT

## 2023-04-14 ENCOUNTER — OFFICE VISIT (OUTPATIENT)
Dept: FAMILY MEDICINE | Facility: CLINIC | Age: 73
End: 2023-04-14
Payer: MEDICARE

## 2023-04-14 VITALS
OXYGEN SATURATION: 100 % | HEIGHT: 66 IN | DIASTOLIC BLOOD PRESSURE: 73 MMHG | SYSTOLIC BLOOD PRESSURE: 133 MMHG | WEIGHT: 177 LBS | TEMPERATURE: 98 F | BODY MASS INDEX: 28.45 KG/M2 | RESPIRATION RATE: 16 BRPM | HEART RATE: 56 BPM

## 2023-04-14 DIAGNOSIS — N95.1 HOT FLASHES DUE TO MENOPAUSE: ICD-10-CM

## 2023-04-14 DIAGNOSIS — E55.9 VITAMIN D DEFICIENCY: ICD-10-CM

## 2023-04-14 DIAGNOSIS — I1A.0 RESISTANT HYPERTENSION: Primary | ICD-10-CM

## 2023-04-14 PROCEDURE — 3288F PR FALLS RISK ASSESSMENT DOCUMENTED: ICD-10-PCS | Mod: CPTII,,, | Performed by: FAMILY MEDICINE

## 2023-04-14 PROCEDURE — 3078F PR MOST RECENT DIASTOLIC BLOOD PRESSURE < 80 MM HG: ICD-10-PCS | Mod: CPTII,,, | Performed by: FAMILY MEDICINE

## 2023-04-14 PROCEDURE — 4010F PR ACE/ARB THEARPY RXD/TAKEN: ICD-10-PCS | Mod: CPTII,,, | Performed by: FAMILY MEDICINE

## 2023-04-14 PROCEDURE — 3075F SYST BP GE 130 - 139MM HG: CPT | Mod: CPTII,,, | Performed by: FAMILY MEDICINE

## 2023-04-14 PROCEDURE — 3078F DIAST BP <80 MM HG: CPT | Mod: CPTII,,, | Performed by: FAMILY MEDICINE

## 2023-04-14 PROCEDURE — 99214 OFFICE O/P EST MOD 30 MIN: CPT | Mod: S$PBB,,, | Performed by: FAMILY MEDICINE

## 2023-04-14 PROCEDURE — 1101F PT FALLS ASSESS-DOCD LE1/YR: CPT | Mod: CPTII,,, | Performed by: FAMILY MEDICINE

## 2023-04-14 PROCEDURE — 99214 PR OFFICE/OUTPT VISIT, EST, LEVL IV, 30-39 MIN: ICD-10-PCS | Mod: S$PBB,,, | Performed by: FAMILY MEDICINE

## 2023-04-14 PROCEDURE — 1159F PR MEDICATION LIST DOCUMENTED IN MEDICAL RECORD: ICD-10-PCS | Mod: CPTII,,, | Performed by: FAMILY MEDICINE

## 2023-04-14 PROCEDURE — 1101F PR PT FALLS ASSESS DOC 0-1 FALLS W/OUT INJ PAST YR: ICD-10-PCS | Mod: CPTII,,, | Performed by: FAMILY MEDICINE

## 2023-04-14 PROCEDURE — 1159F MED LIST DOCD IN RCRD: CPT | Mod: CPTII,,, | Performed by: FAMILY MEDICINE

## 2023-04-14 PROCEDURE — 99214 OFFICE O/P EST MOD 30 MIN: CPT | Mod: PBBFAC | Performed by: FAMILY MEDICINE

## 2023-04-14 PROCEDURE — 3288F FALL RISK ASSESSMENT DOCD: CPT | Mod: CPTII,,, | Performed by: FAMILY MEDICINE

## 2023-04-14 PROCEDURE — 1160F PR REVIEW ALL MEDS BY PRESCRIBER/CLIN PHARMACIST DOCUMENTED: ICD-10-PCS | Mod: CPTII,,, | Performed by: FAMILY MEDICINE

## 2023-04-14 PROCEDURE — 3075F PR MOST RECENT SYSTOLIC BLOOD PRESS GE 130-139MM HG: ICD-10-PCS | Mod: CPTII,,, | Performed by: FAMILY MEDICINE

## 2023-04-14 PROCEDURE — 4010F ACE/ARB THERAPY RXD/TAKEN: CPT | Mod: CPTII,,, | Performed by: FAMILY MEDICINE

## 2023-04-14 PROCEDURE — 1160F RVW MEDS BY RX/DR IN RCRD: CPT | Mod: CPTII,,, | Performed by: FAMILY MEDICINE

## 2023-04-14 RX ORDER — HYDRALAZINE HYDROCHLORIDE 50 MG/1
50 TABLET, FILM COATED ORAL EVERY 12 HOURS
Qty: 60 TABLET | Refills: 2 | Status: SHIPPED | OUTPATIENT
Start: 2023-04-14 | End: 2023-06-28

## 2023-04-14 RX ORDER — ERGOCALCIFEROL 1.25 MG/1
50000 CAPSULE ORAL
Qty: 12 CAPSULE | Refills: 0 | Status: SHIPPED | OUTPATIENT
Start: 2023-04-14 | End: 2023-11-03 | Stop reason: SDUPTHER

## 2023-04-14 RX ORDER — PAROXETINE 10 MG/1
10 TABLET, FILM COATED ORAL DAILY
Qty: 30 TABLET | Refills: 2 | Status: SHIPPED | OUTPATIENT
Start: 2023-04-14 | End: 2023-06-28 | Stop reason: SDUPTHER

## 2023-04-14 NOTE — PROGRESS NOTES
"Patient Name: Neris Contreras   : 1950  MRN: [id number]     SUBJECTIVE:  Neris Contreras is a 73 y.o. female here for Follow-up and Hypertension (Routine follow up for HTN and lab results)  .    HPI  Here for close follow-up of hypertension.  Last visit : "Advised patient to continue same management, except for Lasix will be increased to 40 mg given the underline CHF and pulmonary hypertension with increased leg swelling lately.  Patient has CKD so will have to recheck CMP in 2 weeks.  "  Patient did take Lasix 40 mg and creatinine actually improved with the latest labs.  Reviewed labs with patient.  Also taking benzepril 40, amlodipine 10 mg, hydralazine 50 mg BID.  Today with these changes, blood pressure 133/73. Feels well. Resolved leg swelling as well.    Having hot flashes from menopause for many years, but would like to have this addressed now.  Willing to start medication to control them.  Denies any anxiety or depression.      ALLERGIES:   Review of patient's allergies indicates:   Allergen Reactions    Iodine Hives     topical    Iodine and iodide containing products     Shellfish containing products      Other reaction(s): whelps/ itching         ROS:  Review of Systems   Constitutional:  Negative for chills, fever and weight loss.   HENT:  Negative for congestion, ear pain and sore throat.    Eyes:  Negative for blurred vision and pain.   Respiratory:  Negative for cough, shortness of breath and wheezing.    Cardiovascular:  Negative for chest pain, palpitations, leg swelling and PND.   Gastrointestinal:  Negative for abdominal pain, blood in stool, constipation, diarrhea, nausea and vomiting.   Genitourinary:  Negative for dysuria, flank pain and hematuria.   Musculoskeletal:  Negative for falls and myalgias.   Skin:  Negative for rash.   Neurological:  Negative for dizziness, focal weakness and headaches.   Psychiatric/Behavioral:  Negative for depression and substance abuse. The patient is not " "nervous/anxious.        OBJECTIVE:  Vital signs  Vitals:    04/14/23 0744   BP: 133/73   Pulse: (!) 56   Resp: 16   Temp: 97.5 °F (36.4 °C)   SpO2: 100%   Weight: 80.3 kg (177 lb)   Height: 5' 6" (1.676 m)      Body mass index is 28.57 kg/m².    PHYSICAL EXAM:   Physical Exam  Vitals reviewed.   Constitutional:       General: She is not in acute distress.     Appearance: Normal appearance. She is not ill-appearing.   HENT:      Head: Normocephalic and atraumatic.      Right Ear: External ear normal.      Left Ear: External ear normal.      Nose: Nose normal. No rhinorrhea.      Mouth/Throat:      Mouth: Mucous membranes are moist.   Eyes:      General: No scleral icterus.        Right eye: No discharge.         Left eye: No discharge.      Conjunctiva/sclera: Conjunctivae normal.      Pupils: Pupils are equal, round, and reactive to light.   Cardiovascular:      Rate and Rhythm: Regular rhythm. Bradycardia present.      Heart sounds: No murmur heard.  Pulmonary:      Effort: Pulmonary effort is normal. No respiratory distress.      Breath sounds: No wheezing, rhonchi or rales.   Abdominal:      General: Bowel sounds are normal. There is no distension.      Palpations: Abdomen is soft.      Tenderness: There is no abdominal tenderness.   Musculoskeletal:         General: No swelling. Normal range of motion.      Cervical back: Normal range of motion and neck supple. No rigidity or tenderness.      Right lower leg: No edema.      Left lower leg: No edema.   Skin:     General: Skin is warm.      Coloration: Skin is not pale.      Findings: No rash.   Neurological:      General: No focal deficit present.      Mental Status: She is alert and oriented to person, place, and time.      Sensory: No sensory deficit.      Motor: No weakness.   Psychiatric:         Mood and Affect: Mood normal.         Behavior: Behavior normal.        ASSESSMENT/PLAN:  1. Resistant hypertension  -     hydrALAZINE (APRESOLINE) 50 MG tablet; " Take 1 tablet (50 mg total) by mouth every 12 (twelve) hours.  Dispense: 60 tablet; Refill: 2    2. Hot flashes due to menopause  -     paroxetine (PAXIL) 10 MG tablet; Take 1 tablet (10 mg total) by mouth once daily.  Dispense: 30 tablet; Refill: 2    3. Vitamin D deficiency  -     ergocalciferol (ERGOCALCIFEROL) 50,000 unit Cap; Take 1 capsule (50,000 Units total) by mouth every 7 days.  Dispense: 12 capsule; Refill: 0     Plan  -blood pressures seems to be well-controlled with the latest changes.  Continue that regimen.  Refill hydralazine of increased dose of 50 mg b.i.d..  Continue to follow-up with cardiology.  -start Paxil low-dose 10 mg for hot flashes.  -regarding hypercalcemia workup, calcium actually improved on latest labs  She was also found to be vitamin-D deficient, and will treat this first , then recheck labs in 3 months.        Previous medical history/lab work/radiology reviewed and considered during medical management decisions.   Medication list reviewed and medication reconciliation performed.  Patient was provided  and care about his/her current diagnosis (es) and medications including risk/benefit and side effects/adverse events, over the counter medication uses/doses, home self-care and contact precautions,  and red flags and indications for when to seek immediate medical attention.   Patient was advised to continue compliance with current medication list and medical recommendations.  Recommended/ Advised continued compliance with recommended eating habits/ diets for medical conditions and exercise 150 minutes/ week (if possible) for medical condition (s).        RESULTS:  Recent Results (from the past 1008 hour(s))   CBC with Differential    Collection Time: 03/06/23  1:18 PM   Result Value Ref Range    WBC 5.7 4.5 - 11.5 x10(3)/mcL    RBC 4.30 4.20 - 5.40 x10(6)/mcL    Hgb 11.8 (L) 12.0 - 16.0 g/dL    Hct 38.0 37.0 - 47.0 %    MCV 88.4 80.0 - 94.0 fL    MCH 27.4 pg    MCHC 31.1 (L)  33.0 - 36.0 g/dL    RDW 13.1 11.5 - 17.0 %    Platelet 233 130 - 400 x10(3)/mcL    MPV 10.5 (H) 7.4 - 10.4 fL    Neut % 44.6 %    Lymph % 43.5 %    Mono % 8.4 %    Eos % 2.8 %    Basophil % 0.5 %    Lymph # 2.49 0.6 - 4.6 x10(3)/mcL    Neut # 2.55 2.1 - 9.2 x10(3)/mcL    Mono # 0.48 0.1 - 1.3 x10(3)/mcL    Eos # 0.16 0 - 0.9 x10(3)/mcL    Baso # 0.03 0 - 0.2 x10(3)/mcL    IG# 0.01 0 - 0.04 x10(3)/mcL    IG% 0.2 %    NRBC% 0.0 %   Comprehensive Metabolic Panel    Collection Time: 03/06/23  1:18 PM   Result Value Ref Range    Sodium Level 138 136 - 145 mmol/L    Potassium Level 4.2 3.5 - 5.1 mmol/L    Chloride 109 (H) 98 - 107 mmol/L    Carbon Dioxide 21 (L) 23 - 31 mmol/L    Glucose Level 118 (H) 82 - 115 mg/dL    Blood Urea Nitrogen 13.9 9.8 - 20.1 mg/dL    Creatinine 1.14 (H) 0.55 - 1.02 mg/dL    Calcium Level Total 10.3 (H) 8.4 - 10.2 mg/dL    Protein Total 7.5 5.8 - 7.6 gm/dL    Albumin Level 4.2 3.4 - 4.8 g/dL    Globulin 3.3 2.4 - 3.5 gm/dL    Albumin/Globulin Ratio 1.3 1.1 - 2.0 ratio    Bilirubin Total 0.7 <=1.5 mg/dL    Alkaline Phosphatase 76 40 - 150 unit/L    Alanine Aminotransferase 11 0 - 55 unit/L    Aspartate Aminotransferase 16 5 - 34 unit/L    eGFR 51 mls/min/1.73/m2   Troponin I    Collection Time: 03/06/23  1:18 PM   Result Value Ref Range    Troponin-I 0.015 0.000 - 0.045 ng/mL   BNP    Collection Time: 03/06/23  1:18 PM   Result Value Ref Range    Natriuretic Peptide 17.7 <=100.0 pg/mL   Magnesium    Collection Time: 03/06/23  1:18 PM   Result Value Ref Range    Magnesium Level 1.80 1.60 - 2.60 mg/dL   CK    Collection Time: 03/06/23  1:18 PM   Result Value Ref Range    Creatine Kinase 213 (H) 29 - 168 U/L   Troponin I    Collection Time: 03/06/23  4:44 PM   Result Value Ref Range    Troponin-I <0.010 0.000 - 0.045 ng/mL   Urinalysis, Reflex to Urine Culture    Collection Time: 03/06/23  9:11 PM    Specimen: Urine   Result Value Ref Range    Color, UA Yellow Yellow, Light-Yellow, Dark Yellow,  Dari, Straw    Appearance, UA Clear Clear    Specific Gravity, UA 1.025 1.005 - 1.030    pH, UA 5.5 5.0 - 8.5    Protein, UA Negative Negative mg/dL    Glucose, UA Negative Negative, Normal mg/dL    Ketones, UA Negative Negative mg/dL    Blood, UA Negative Negative unit/L    Bilirubin, UA Negative Negative mg/dL    Urobilinogen, UA 0.2 0.2, 1.0, Normal mg/dL    Nitrites, UA Negative Negative    Leukocyte Esterase, UA Negative Negative unit/L   Urinalysis, Microscopic    Collection Time: 03/06/23  9:11 PM   Result Value Ref Range    RBC, UA <5 <=5 /HPF    WBC, UA <5 <=5 /HPF    Squamous Epithelial Cells, UA <5 <=5 /HPF    Bacteria, UA None Seen None Seen, Rare, Occasional /HPF   PTH, Intact    Collection Time: 04/11/23  7:14 AM   Result Value Ref Range    Parathyroid Hormone Intact 124.5 (H) 8.7 - 77.0 pg/mL   Vitamin D    Collection Time: 04/11/23  7:14 AM   Result Value Ref Range    Vit D 25 OH 19.4 (L) 30.0 - 80.0 ng/mL   Comprehensive Metabolic Panel    Collection Time: 04/11/23  7:14 AM   Result Value Ref Range    Sodium Level 143 136 - 145 mmol/L    Potassium Level 3.7 3.5 - 5.1 mmol/L    Chloride 110 (H) 98 - 107 mmol/L    Carbon Dioxide 28 23 - 31 mmol/L    Glucose Level 119 (H) 82 - 115 mg/dL    Blood Urea Nitrogen 13.3 9.8 - 20.1 mg/dL    Creatinine 1.08 (H) 0.55 - 1.02 mg/dL    Calcium Level Total 10.2 8.4 - 10.2 mg/dL    Protein Total 7.4 5.8 - 7.6 gm/dL    Albumin Level 4.1 3.4 - 4.8 g/dL    Globulin 3.3 2.4 - 3.5 gm/dL    Albumin/Globulin Ratio 1.2 1.1 - 2.0 ratio    Bilirubin Total 0.4 <=1.5 mg/dL    Alkaline Phosphatase 74 40 - 150 unit/L    Alanine Aminotransferase 12 0 - 55 unit/L    Aspartate Aminotransferase 15 5 - 34 unit/L    eGFR 54 mls/min/1.73/m2   Hepatitis C Antibody    Collection Time: 04/11/23  7:14 AM   Result Value Ref Range    Hepatitis C Antibody Nonreactive Nonreactive         Follow Up:  Follow up in about 3 months (around 7/14/2023) for htn, hot flashes, vit d.      [unfilled]    This note was created with the assistance of a voice recognition software or phone dictation. There may be transcription errors as a result of using this technology however minimal. Effort has been made to assure accuracy of transcription but any obvious errors or omissions should be clarified with the author of the document

## 2023-06-01 RX ORDER — POTASSIUM CHLORIDE 1500 MG/1
20 TABLET, EXTENDED RELEASE ORAL DAILY
Qty: 30 TABLET | Refills: 0 | Status: SHIPPED | OUTPATIENT
Start: 2023-06-01 | End: 2023-06-28

## 2023-06-01 RX ORDER — AMLODIPINE BESYLATE 10 MG/1
10 TABLET ORAL DAILY
Qty: 30 TABLET | Refills: 0 | Status: SHIPPED | OUTPATIENT
Start: 2023-06-01 | End: 2023-06-26

## 2023-06-01 RX ORDER — CLOPIDOGREL BISULFATE 75 MG/1
75 TABLET ORAL DAILY
Qty: 30 TABLET | Refills: 0 | Status: SHIPPED | OUTPATIENT
Start: 2023-06-01 | End: 2023-06-26

## 2023-06-01 NOTE — TELEPHONE ENCOUNTER
----- Message from Paula Rupert sent at 6/1/2023 11:53 AM CDT -----  Regarding: Refill  Provider: Dr. Ariadne Fritz  Preferred Pharmacy: [facility] ([facility] )  Last Visit:   Next Visit: 7/14/23  Patient's Contact Number: [phone]    1. Name of Medication: amLODIPine (NORVASC) 10 MG tablet  Dosage:   Comments:     2. Name of Medication: clopidogreL (PLAVIX) 75 mg tablet  Dosage:   Comments:     3. Name of Medication: potassium chloride (K-TAB) 20 mEq  Dosage:   Comments     4. Name of Medication:   Dosage:   Comments:     5. Name of Medication:   Dosage:   Comments:

## 2023-06-12 PROBLEM — G45.9 TIA (TRANSIENT ISCHEMIC ATTACK): Status: RESOLVED | Noted: 2023-03-09 | Resolved: 2023-06-12

## 2023-06-23 DIAGNOSIS — I1A.0 RESISTANT HYPERTENSION: ICD-10-CM

## 2023-06-26 RX ORDER — CLOPIDOGREL BISULFATE 75 MG/1
TABLET ORAL
Qty: 30 TABLET | Refills: 11 | Status: SHIPPED | OUTPATIENT
Start: 2023-06-26 | End: 2024-02-16 | Stop reason: SDUPTHER

## 2023-06-26 RX ORDER — AMLODIPINE BESYLATE 10 MG/1
TABLET ORAL
Qty: 30 TABLET | Refills: 1 | Status: SHIPPED | OUTPATIENT
Start: 2023-06-26 | End: 2023-09-08

## 2023-06-28 DIAGNOSIS — I1A.0 RESISTANT HYPERTENSION: ICD-10-CM

## 2023-06-28 DIAGNOSIS — N95.1 HOT FLASHES DUE TO MENOPAUSE: ICD-10-CM

## 2023-06-28 RX ORDER — POTASSIUM CHLORIDE 1500 MG/1
TABLET, EXTENDED RELEASE ORAL
Qty: 30 TABLET | Refills: 11 | Status: SHIPPED | OUTPATIENT
Start: 2023-06-28 | End: 2023-11-03 | Stop reason: SDUPTHER

## 2023-06-28 RX ORDER — HYDRALAZINE HYDROCHLORIDE 50 MG/1
TABLET, FILM COATED ORAL
Qty: 60 TABLET | Refills: 0 | Status: SHIPPED | OUTPATIENT
Start: 2023-06-28 | End: 2023-07-17

## 2023-06-29 RX ORDER — BENAZEPRIL HYDROCHLORIDE 40 MG/1
TABLET ORAL
Qty: 30 TABLET | Refills: 0 | Status: SHIPPED | OUTPATIENT
Start: 2023-06-29 | End: 2023-11-03 | Stop reason: SDUPTHER

## 2023-06-29 RX ORDER — ATORVASTATIN CALCIUM 20 MG/1
20 TABLET, FILM COATED ORAL NIGHTLY
Qty: 30 TABLET | Refills: 0 | Status: SHIPPED | OUTPATIENT
Start: 2023-06-29 | End: 2023-07-14

## 2023-06-29 RX ORDER — FUROSEMIDE 40 MG/1
40 TABLET ORAL DAILY
Qty: 30 TABLET | Refills: 0 | Status: SHIPPED | OUTPATIENT
Start: 2023-06-29 | End: 2023-07-19

## 2023-06-29 RX ORDER — PAROXETINE 10 MG/1
10 TABLET, FILM COATED ORAL DAILY
Qty: 30 TABLET | Refills: 0 | Status: SHIPPED | OUTPATIENT
Start: 2023-06-29 | End: 2023-07-19

## 2023-07-14 RX ORDER — ATORVASTATIN CALCIUM 20 MG/1
TABLET, FILM COATED ORAL
Qty: 30 TABLET | Refills: 11 | Status: SHIPPED | OUTPATIENT
Start: 2023-07-14 | End: 2023-11-03 | Stop reason: SDUPTHER

## 2023-07-15 DIAGNOSIS — I1A.0 RESISTANT HYPERTENSION: ICD-10-CM

## 2023-07-17 RX ORDER — HYDRALAZINE HYDROCHLORIDE 50 MG/1
TABLET, FILM COATED ORAL
Qty: 60 TABLET | Refills: 11 | Status: SHIPPED | OUTPATIENT
Start: 2023-07-17 | End: 2023-11-03 | Stop reason: SDUPTHER

## 2023-07-19 DIAGNOSIS — I1A.0 RESISTANT HYPERTENSION: ICD-10-CM

## 2023-07-19 DIAGNOSIS — N95.1 HOT FLASHES DUE TO MENOPAUSE: ICD-10-CM

## 2023-07-19 RX ORDER — PAROXETINE 10 MG/1
TABLET, FILM COATED ORAL
Qty: 30 TABLET | Refills: 11 | Status: SHIPPED | OUTPATIENT
Start: 2023-07-19 | End: 2023-11-03

## 2023-07-19 RX ORDER — FUROSEMIDE 40 MG/1
TABLET ORAL
Qty: 30 TABLET | Refills: 11 | Status: SHIPPED | OUTPATIENT
Start: 2023-07-19 | End: 2023-11-03 | Stop reason: SDUPTHER

## 2023-08-29 NOTE — PROGRESS NOTES
CHIEF COMPLAINT:   Chief Complaint   Patient presents with    Follow-up     6 mos f/u w/14 day monitor pt is concerned that carotid artery is enlarged and seems to be causing swallowing issues.denies other cardiac targets. Also c/o be swelling lower extremeties                                                  HPI:  Neris Contreras 73 y.o. female with a PMH significant for mild pulm HTN (WHO class II), TIA, CARISSA, HTN who presents to cardiology clinic for follow up and results of testing.  At her last appointment patient endorse shortness a breath and dyspnea on exertion, she had had an ED visit and completed a cardiac workup there which was unremarkable.  At last office visit she was found to be bradycardic for which 14 day event monitor was ordered.  14 day event monitor revealed underlying rhythm is sinus, no significant arrhythmias or pauses noted, during very symptoms the patient was in normal sinus rhythm or sinus tachycardia (see full report below).     Today the patient endorses ongoing shortness of breath and dyspnea on exertion, as well as bilateral lower extremity edema.  Of note, she was previously on HCTZ but it has since been discontinued per PCP due to kidney function, so now patient is only on Lasix 40 mg daily for diuresis.  She states that her shortness of breaths typically occurs with movement in over exertion.  Otherwise she denies any chest pain, palpitations, lightheadedness, dizziness, syncope, PND, or orthopnea.  She states that she is able to complete her ADLs without any issues or ischemic symptoms.  She states that she is active in her day-to-day life but does not exercise as frequently as she previously did, she states that she will increase her exercise frequency once temperatures outside are not so hot.  She reports compliance with all medications.  She denies any tobacco use.                                                                                                                                                                                                                                                                                                                                                                                                                                                                                       CARDIAC TESTIN Day Cardiac Event Monitor 3.14.23-3.27.23  Underlying rhythm is sinus   No significant arrhythmia noted   During very symptoms, the patient is either in normal sinus rhythm or sinus tachycardia with heart rate up to 102  Average heart rate 69 BPM, minimum heart rate 41 BPM, max heart rate 120 BPM    EKG (3/9/2023):  Sinus bradycardia, LVH     TTE (2019):  Summary  Borderline Concentric left ventricular hypertrophy.  Left ventricular ejection fraction is measured at approximately 60%.     LHC/Cors (2017):  No CAD (no report found)    Patient Active Problem List   Diagnosis    Resistant hypertension    Bradycardia    Chronic kidney disease    Prediabetes    Pulmonary hypertension     Past Surgical History:   Procedure Laterality Date    CARDIAC CATHETERIZATION      CARDIAC SURGERY      EYE SURGERY      LEG SURGERY Right     OPEN REDUCTION AND INTERNAL FIXATION (ORIF) OF INJURY OF ELBOW      REPAIR OF EYELID Left 2022    Procedure: REPAIR, EYELID;  Surgeon: Isaak Wang MD;  Location: [facility];  Service: ENT;  Laterality: Left;     Social History     Socioeconomic History    Marital status:    Tobacco Use    Smoking status: Never    Smokeless tobacco: Never   Substance and Sexual Activity    Alcohol use: Yes     Comment: social    Drug use: Never        History reviewed. No pertinent family history.  Review of patient's allergies indicates:   Allergen Reactions    Iodine Hives     topical    Iodine and iodide containing products     Shellfish containing products      Other reaction(s): whelps/ itching         ROS:  Review of Systems  "  Constitutional: Negative.    HENT: Negative.     Eyes: Negative.    Respiratory:  Positive for shortness of breath.    Cardiovascular:  Positive for leg swelling. Negative for chest pain, palpitations, orthopnea, claudication and PND.   Gastrointestinal: Negative.    Genitourinary: Negative.    Musculoskeletal: Negative.    Skin: Negative.    Neurological: Negative.  Negative for dizziness and weakness.   Endo/Heme/Allergies: Negative.    Psychiatric/Behavioral: Negative.                                                                                                                                                                                  Negative except as stated in the history of present illness. See HPI for details.    PHYSICAL EXAM:  Visit Vitals  /70 (BP Location: Right arm, Patient Position: Sitting, BP Method: Medium (Automatic))   Temp 98.2 °F (36.8 °C)   Resp 18   Ht 5' 6.04" (1.677 m)   Wt 81.8 kg (180 lb 5.4 oz)   SpO2 98%   BMI 29.07 kg/m²       Physical Exam  HENT:      Head: Normocephalic.      Nose: Nose normal.      Mouth/Throat:      Mouth: Mucous membranes are moist.   Eyes:      Extraocular Movements: Extraocular movements intact.   Cardiovascular:      Rate and Rhythm: Normal rate and regular rhythm.   Pulmonary:      Effort: Pulmonary effort is normal.   Abdominal:      General: There is no distension.      Palpations: Abdomen is soft.      Tenderness: There is no abdominal tenderness.   Musculoskeletal:         General: Normal range of motion.      Cervical back: Normal range of motion.      Right lower leg: Edema present.      Left lower leg: Edema present.   Skin:     General: Skin is warm.   Neurological:      General: No focal deficit present.      Mental Status: She is alert.   Psychiatric:         Mood and Affect: Mood normal.         Current Outpatient Medications   Medication Instructions    albuterol (PROVENTIL/VENTOLIN HFA) 90 mcg/actuation inhaler 2 puffs, Inhalation, " Every 4 hours    amLODIPine (NORVASC) 10 MG tablet TAKE 1 TABLET BY MOUTH ONCE  DAILY    atorvastatin (LIPITOR) 20 MG tablet TAKE 1 TABLET BY MOUTH IN THE  EVENING    atorvastatin (LIPITOR) 20 MG tablet 1 tablet, Oral, Nightly    benazepriL (LOTENSIN) 40 MG tablet See Instructions, TAKE 1 TABLET BY MOUTH ONCE DAILY, # 90 tab(s), 3 Refill(s), [facility], 168, cm, Height/Length Dosing, 07/15/21 10:50:00 CDT, 75.6, kg, Weight Dosing, 07/15/21 10:50:00 CDT    clopidogreL (PLAVIX) 75 mg tablet TAKE 1 TABLET BY MOUTH ONCE  DAILY    ergocalciferol (ERGOCALCIFEROL) 50,000 Units, Oral, Every 7 days    fluticasone propionate (FLONASE) 50 mcg/actuation nasal spray 1 spray, Each Nostril, Daily PRN    furosemide (LASIX) 40 MG tablet TAKE 1 TABLET BY MOUTH ONCE  DAILY    glipiZIDE (GLUCOTROL) 5 MG tablet No dose, route, or frequency recorded.    hydrALAZINE (APRESOLINE) 50 MG tablet TAKE 1 TABLET BY MOUTH EVERY 12  HOURS    linaCLOtide (LINZESS) 145 mcg, Oral, Before breakfast    paroxetine (PAXIL) 10 MG tablet TAKE 1 TABLET BY MOUTH ONCE  DAILY    potassium chloride (K-TAB) 20 mEq TAKE 1 TABLET BY MOUTH ONCE  DAILY    TYRVAYA 0.03 mg/spray sprm Each Nostril        All medications, laboratory studies, cardiac diagnostic imaging reviewed.     Lab Results   Component Value Date    LDL 68.00 02/27/2023    LDL 61.00 05/08/2021    TRIG 63 02/27/2023    TRIG 65 05/08/2021    CREATININE 1.08 (H) 04/11/2023    MG 1.80 03/06/2023    K 3.7 04/11/2023        ASSESSMENT/PLAN:  72 y.o. female with the following medical problems:     SOB/BLACKMAN  Bilateral Lower Extremity Edema  HFpEF  - Reports SOB/BLACKMAN today that is progressing   - History of pulmonary hypertension  - Reported history of HFpEF, most recent echo with EF 60% in 2021  - Still able to complete ADLS  - Reports symptoms have worsened since she was taken off of HCTZ  - Will obtain repeat Echo given worsening symptoms and to assess HF burden/guidance for management    - Continue daily lasix  - Tight BP control   - Advised on low-sodium diet (<2g daily), compression stockings, leg elevation    CVA  - History of CVA/TIA  - continue plavix, statin    HTN  - BP at goal today   - Continue current regimen   - Counseled on low-sodium, heart healthy diet and exercise as tolerated    Sinus Bradycardia  - 14 Day Holter- underlying sinus rhythm, no significant arrhythmias, no pauses, during symptoms, patient either in normal sinus rhythm or sinus tachy; average heart rate 69  - Denies any bradycardic symptoms     CARISSA  - Patient states that she believes she was diagnosed with CARISSA in the past, however she does not wear a CPAP at night due to inability to tolerate    Dysphagia  - Patient states that she has been having difficulty swallowing over the last 3 months  - Advised patient to discuss this with her PCP as they may be able to further evaluate and order testing       Complete echo prior to next office visit   Follow up in cardiology clinic in 4 months or sooner if needed   Follow up with PCP as directed

## 2023-08-30 RX ORDER — GLIPIZIDE 5 MG/1
TABLET ORAL
COMMUNITY
Start: 2023-04-11 | End: 2023-11-03

## 2023-08-31 ENCOUNTER — OFFICE VISIT (OUTPATIENT)
Dept: CARDIOLOGY | Facility: CLINIC | Age: 73
End: 2023-08-31
Payer: MEDICARE

## 2023-08-31 VITALS
HEIGHT: 66 IN | BODY MASS INDEX: 28.98 KG/M2 | TEMPERATURE: 98 F | WEIGHT: 180.31 LBS | SYSTOLIC BLOOD PRESSURE: 133 MMHG | DIASTOLIC BLOOD PRESSURE: 70 MMHG | OXYGEN SATURATION: 98 % | RESPIRATION RATE: 18 BRPM

## 2023-08-31 DIAGNOSIS — R60.0 BILATERAL LOWER EXTREMITY EDEMA: ICD-10-CM

## 2023-08-31 DIAGNOSIS — R06.02 SHORTNESS OF BREATH: Primary | ICD-10-CM

## 2023-08-31 DIAGNOSIS — I27.20 PULMONARY HYPERTENSION: ICD-10-CM

## 2023-08-31 DIAGNOSIS — I1A.0 RESISTANT HYPERTENSION: ICD-10-CM

## 2023-08-31 DIAGNOSIS — R00.1 BRADYCARDIA: ICD-10-CM

## 2023-08-31 PROCEDURE — 99215 OFFICE O/P EST HI 40 MIN: CPT | Mod: PBBFAC

## 2023-08-31 RX ORDER — ATORVASTATIN CALCIUM 20 MG/1
1 TABLET, FILM COATED ORAL NIGHTLY
COMMUNITY
Start: 2023-07-14 | End: 2023-11-03

## 2023-08-31 RX ORDER — GLIPIZIDE 5 MG/1
TABLET ORAL
OUTPATIENT
Start: 2023-08-31

## 2023-08-31 RX ORDER — ALBUTEROL SULFATE 90 UG/1
2 AEROSOL, METERED RESPIRATORY (INHALATION) EVERY 4 HOURS
COMMUNITY
Start: 2023-08-14

## 2023-08-31 RX ORDER — VARENICLINE 0.03 MG/.05ML
SPRAY NASAL
COMMUNITY
Start: 2023-07-17

## 2023-08-31 NOTE — PATIENT INSTRUCTIONS
Complete echo prior to next office visit   Refills per PCP   Follow up in cardiology clinic in 4 months or sooner if needed   Follow up with PCP as directed

## 2023-09-08 RX ORDER — AMLODIPINE BESYLATE 10 MG/1
TABLET ORAL
Qty: 30 TABLET | Refills: 1 | Status: SHIPPED | OUTPATIENT
Start: 2023-09-08 | End: 2023-11-03 | Stop reason: SDUPTHER

## 2023-09-27 ENCOUNTER — HOSPITAL ENCOUNTER (OUTPATIENT)
Dept: CARDIOLOGY | Facility: HOSPITAL | Age: 73
Discharge: HOME | End: 2023-09-27
Payer: MEDICARE

## 2023-09-27 VITALS
WEIGHT: 180 LBS | BODY MASS INDEX: 28.93 KG/M2 | SYSTOLIC BLOOD PRESSURE: 166 MMHG | DIASTOLIC BLOOD PRESSURE: 77 MMHG | HEIGHT: 66 IN

## 2023-09-27 DIAGNOSIS — R60.0 BILATERAL LOWER EXTREMITY EDEMA: ICD-10-CM

## 2023-09-27 DIAGNOSIS — R06.02 SHORTNESS OF BREATH: ICD-10-CM

## 2023-09-27 LAB
AV INDEX (PROSTH): 0.8
AV MEAN GRADIENT: 4 MMHG
AV PEAK GRADIENT: 10 MMHG
AV VALVE AREA BY VELOCITY RATIO: 2.07 CM²
AV VALVE AREA: 2.5 CM²
AV VELOCITY RATIO: 0.66
BSA FOR ECHO PROCEDURE: 1.95 M2
CV ECHO LV RWT: 0.67 CM
DOP CALC AO PEAK VEL: 1.55 M/S
DOP CALC AO VTI: 34.6 CM
DOP CALC LVOT AREA: 3.1 CM2
DOP CALC LVOT DIAMETER: 1.99 CM
DOP CALC LVOT PEAK VEL: 1.03 M/S
DOP CALC LVOT STROKE VOLUME: 86.42 CM3
DOP CALC MV VTI: 39.4 CM
DOP CALCLVOT PEAK VEL VTI: 27.8 CM
E WAVE DECELERATION TIME: 236.38 MSEC
E/A RATIO: 0.75
ECHO LV POSTERIOR WALL: 1.33 CM (ref 0.6–1.1)
FRACTIONAL SHORTENING: 48 % (ref 28–44)
HR MV ECHO: 38 BPM
INTERVENTRICULAR SEPTUM: 1.16 CM (ref 0.6–1.1)
IVC DIAMETER: 2.2 CM
LEFT ATRIUM SIZE: 3.49 CM
LEFT ATRIUM VOLUME INDEX MOD: 18.4 ML/M2
LEFT ATRIUM VOLUME MOD: 35.09 CM3
LEFT INTERNAL DIMENSION IN SYSTOLE: 2.05 CM (ref 2.1–4)
LEFT VENTRICLE DIASTOLIC VOLUME INDEX: 35.66 ML/M2
LEFT VENTRICLE DIASTOLIC VOLUME: 68.12 ML
LEFT VENTRICLE MASS INDEX: 90 G/M2
LEFT VENTRICLE SYSTOLIC VOLUME INDEX: 7.1 ML/M2
LEFT VENTRICLE SYSTOLIC VOLUME: 13.54 ML
LEFT VENTRICULAR INTERNAL DIMENSION IN DIASTOLE: 3.95 CM (ref 3.5–6)
LEFT VENTRICULAR MASS: 171.54 G
LV LATERAL E/E' RATIO: 8 M/S
LVOT MG: 2.15 MMHG
LVOT MV: 0.68 CM/S
MV MEAN GRADIENT: 1 MMHG
MV PEAK A VEL: 0.75 M/S
MV PEAK E VEL: 0.56 M/S
MV PEAK GRADIENT: 4 MMHG
MV VALVE AREA BY CONTINUITY EQUATION: 2.19 CM2
OHS LV EJECTION FRACTION SIMPSONS BIPLANE MOD: 54 %
PISA MRMAX VEL: 4.75 M/S
PISA TR MAX VEL: 2.86 M/S
RA MAJOR: 4.06 CM
RA PRESSURE ESTIMATED: 3 MMHG
RV TB RVSP: 6 MMHG
TDI LATERAL: 0.07 M/S
TR MAX PG: 33 MMHG
TRICUSPID ANNULAR PLANE SYSTOLIC EXCURSION: 2.27 CM
TV REST PULMONARY ARTERY PRESSURE: 36 MMHG
Z-SCORE OF LEFT VENTRICULAR DIMENSION IN END DIASTOLE: -3.08
Z-SCORE OF LEFT VENTRICULAR DIMENSION IN END SYSTOLE: -3.79

## 2023-09-27 PROCEDURE — 93306 TTE W/DOPPLER COMPLETE: CPT

## 2023-11-03 ENCOUNTER — OFFICE VISIT (OUTPATIENT)
Dept: FAMILY MEDICINE | Facility: CLINIC | Age: 73
End: 2023-11-03
Payer: MEDICARE

## 2023-11-03 ENCOUNTER — HOSPITAL ENCOUNTER (OUTPATIENT)
Dept: RADIOLOGY | Facility: HOSPITAL | Age: 73
Discharge: HOME | End: 2023-11-03
Attending: FAMILY MEDICINE
Payer: MEDICARE

## 2023-11-03 VITALS
BODY MASS INDEX: 29.89 KG/M2 | RESPIRATION RATE: 18 BRPM | DIASTOLIC BLOOD PRESSURE: 92 MMHG | HEART RATE: 62 BPM | SYSTOLIC BLOOD PRESSURE: 160 MMHG | WEIGHT: 186 LBS | HEIGHT: 66 IN | TEMPERATURE: 98 F | OXYGEN SATURATION: 99 %

## 2023-11-03 DIAGNOSIS — I1A.0 RESISTANT HYPERTENSION: Primary | ICD-10-CM

## 2023-11-03 DIAGNOSIS — N18.31 STAGE 3A CHRONIC KIDNEY DISEASE: ICD-10-CM

## 2023-11-03 DIAGNOSIS — J01.00 ACUTE MAXILLARY SINUSITIS, RECURRENCE NOT SPECIFIED: ICD-10-CM

## 2023-11-03 DIAGNOSIS — G89.29 CHRONIC MIDLINE THORACIC BACK PAIN: ICD-10-CM

## 2023-11-03 DIAGNOSIS — R13.10 DYSPHAGIA, UNSPECIFIED TYPE: ICD-10-CM

## 2023-11-03 DIAGNOSIS — Z86.39 HISTORY OF DIABETES MELLITUS: ICD-10-CM

## 2023-11-03 DIAGNOSIS — N95.1 HOT FLASHES DUE TO MENOPAUSE: ICD-10-CM

## 2023-11-03 DIAGNOSIS — K59.09 CHRONIC CONSTIPATION: ICD-10-CM

## 2023-11-03 DIAGNOSIS — Z01.419 WELL WOMAN EXAM: ICD-10-CM

## 2023-11-03 DIAGNOSIS — M54.6 CHRONIC MIDLINE THORACIC BACK PAIN: ICD-10-CM

## 2023-11-03 DIAGNOSIS — E78.5 HYPERLIPIDEMIA, UNSPECIFIED HYPERLIPIDEMIA TYPE: ICD-10-CM

## 2023-11-03 DIAGNOSIS — I73.9 PAD (PERIPHERAL ARTERY DISEASE): ICD-10-CM

## 2023-11-03 DIAGNOSIS — E55.9 VITAMIN D DEFICIENCY: ICD-10-CM

## 2023-11-03 PROCEDURE — 72070 X-RAY EXAM THORAC SPINE 2VWS: CPT | Mod: TC

## 2023-11-03 PROCEDURE — 1159F PR MEDICATION LIST DOCUMENTED IN MEDICAL RECORD: ICD-10-PCS | Mod: CPTII,,, | Performed by: FAMILY MEDICINE

## 2023-11-03 PROCEDURE — 1101F PT FALLS ASSESS-DOCD LE1/YR: CPT | Mod: CPTII,,, | Performed by: FAMILY MEDICINE

## 2023-11-03 PROCEDURE — 3288F PR FALLS RISK ASSESSMENT DOCUMENTED: ICD-10-PCS | Mod: CPTII,,, | Performed by: FAMILY MEDICINE

## 2023-11-03 PROCEDURE — 99215 OFFICE O/P EST HI 40 MIN: CPT | Mod: S$PBB,,, | Performed by: FAMILY MEDICINE

## 2023-11-03 PROCEDURE — 1125F PR PAIN SEVERITY QUANTIFIED, PAIN PRESENT: ICD-10-PCS | Mod: CPTII,,, | Performed by: FAMILY MEDICINE

## 2023-11-03 PROCEDURE — 3080F DIAST BP >= 90 MM HG: CPT | Mod: CPTII,,, | Performed by: FAMILY MEDICINE

## 2023-11-03 PROCEDURE — 3044F PR MOST RECENT HEMOGLOBIN A1C LEVEL <7.0%: ICD-10-PCS | Mod: CPTII,,, | Performed by: FAMILY MEDICINE

## 2023-11-03 PROCEDURE — 3077F PR MOST RECENT SYSTOLIC BLOOD PRESSURE >= 140 MM HG: ICD-10-PCS | Mod: CPTII,,, | Performed by: FAMILY MEDICINE

## 2023-11-03 PROCEDURE — 71046 X-RAY EXAM CHEST 2 VIEWS: CPT | Mod: TC

## 2023-11-03 PROCEDURE — 1159F MED LIST DOCD IN RCRD: CPT | Mod: CPTII,,, | Performed by: FAMILY MEDICINE

## 2023-11-03 PROCEDURE — 1160F RVW MEDS BY RX/DR IN RCRD: CPT | Mod: CPTII,,, | Performed by: FAMILY MEDICINE

## 2023-11-03 PROCEDURE — 3077F SYST BP >= 140 MM HG: CPT | Mod: CPTII,,, | Performed by: FAMILY MEDICINE

## 2023-11-03 PROCEDURE — 1101F PR PT FALLS ASSESS DOC 0-1 FALLS W/OUT INJ PAST YR: ICD-10-PCS | Mod: CPTII,,, | Performed by: FAMILY MEDICINE

## 2023-11-03 PROCEDURE — 3080F PR MOST RECENT DIASTOLIC BLOOD PRESSURE >= 90 MM HG: ICD-10-PCS | Mod: CPTII,,, | Performed by: FAMILY MEDICINE

## 2023-11-03 PROCEDURE — 99215 OFFICE O/P EST HI 40 MIN: CPT | Mod: PBBFAC,25 | Performed by: FAMILY MEDICINE

## 2023-11-03 PROCEDURE — 3044F HG A1C LEVEL LT 7.0%: CPT | Mod: CPTII,,, | Performed by: FAMILY MEDICINE

## 2023-11-03 PROCEDURE — 4010F PR ACE/ARB THEARPY RXD/TAKEN: ICD-10-PCS | Mod: CPTII,,, | Performed by: FAMILY MEDICINE

## 2023-11-03 PROCEDURE — 3288F FALL RISK ASSESSMENT DOCD: CPT | Mod: CPTII,,, | Performed by: FAMILY MEDICINE

## 2023-11-03 PROCEDURE — 4010F ACE/ARB THERAPY RXD/TAKEN: CPT | Mod: CPTII,,, | Performed by: FAMILY MEDICINE

## 2023-11-03 PROCEDURE — 1160F PR REVIEW ALL MEDS BY PRESCRIBER/CLIN PHARMACIST DOCUMENTED: ICD-10-PCS | Mod: CPTII,,, | Performed by: FAMILY MEDICINE

## 2023-11-03 PROCEDURE — 1125F AMNT PAIN NOTED PAIN PRSNT: CPT | Mod: CPTII,,, | Performed by: FAMILY MEDICINE

## 2023-11-03 PROCEDURE — 99215 PR OFFICE/OUTPT VISIT, EST, LEVL V, 40-54 MIN: ICD-10-PCS | Mod: S$PBB,,, | Performed by: FAMILY MEDICINE

## 2023-11-03 RX ORDER — GABAPENTIN 100 MG/1
100 CAPSULE ORAL 3 TIMES DAILY
Qty: 90 CAPSULE | Refills: 2 | Status: SHIPPED | OUTPATIENT
Start: 2023-11-03 | End: 2024-02-27

## 2023-11-03 RX ORDER — AMLODIPINE BESYLATE 10 MG/1
10 TABLET ORAL DAILY
Qty: 90 TABLET | Refills: 1 | Status: SHIPPED | OUTPATIENT
Start: 2023-11-03

## 2023-11-03 RX ORDER — HYDRALAZINE HYDROCHLORIDE 100 MG/1
100 TABLET, FILM COATED ORAL EVERY 12 HOURS
Qty: 60 TABLET | Refills: 2 | Status: SHIPPED | OUTPATIENT
Start: 2023-11-03 | End: 2024-01-26

## 2023-11-03 RX ORDER — ESCITALOPRAM OXALATE 10 MG/1
10 TABLET ORAL DAILY
Qty: 30 TABLET | Refills: 2 | Status: SHIPPED | OUTPATIENT
Start: 2023-11-03 | End: 2023-11-27

## 2023-11-03 RX ORDER — PANTOPRAZOLE SODIUM 40 MG/1
40 TABLET, DELAYED RELEASE ORAL DAILY
Qty: 30 TABLET | Refills: 3 | Status: SHIPPED | OUTPATIENT
Start: 2023-11-03 | End: 2024-02-21 | Stop reason: SDUPTHER

## 2023-11-03 RX ORDER — ERGOCALCIFEROL 1.25 MG/1
50000 CAPSULE ORAL
Qty: 12 CAPSULE | Refills: 0 | Status: SHIPPED | OUTPATIENT
Start: 2023-11-03

## 2023-11-03 RX ORDER — POTASSIUM CHLORIDE 1500 MG/1
20 TABLET, EXTENDED RELEASE ORAL DAILY
Qty: 30 TABLET | Refills: 5 | Status: SHIPPED | OUTPATIENT
Start: 2023-11-03 | End: 2023-11-27

## 2023-11-03 RX ORDER — AMOXICILLIN AND CLAVULANATE POTASSIUM 875; 125 MG/1; MG/1
1 TABLET, FILM COATED ORAL 2 TIMES DAILY
Qty: 20 TABLET | Refills: 0 | Status: SHIPPED | OUTPATIENT
Start: 2023-11-03 | End: 2023-11-27 | Stop reason: ALTCHOICE

## 2023-11-03 RX ORDER — ATORVASTATIN CALCIUM 20 MG/1
20 TABLET, FILM COATED ORAL NIGHTLY
Qty: 90 TABLET | Refills: 1 | Status: SHIPPED | OUTPATIENT
Start: 2023-11-03

## 2023-11-03 RX ORDER — METHOCARBAMOL 500 MG/1
500 TABLET, FILM COATED ORAL 4 TIMES DAILY
Qty: 40 TABLET | Refills: 0 | Status: SHIPPED | OUTPATIENT
Start: 2023-11-03 | End: 2023-11-13

## 2023-11-03 RX ORDER — FLUTICASONE PROPIONATE 50 MCG
1 SPRAY, SUSPENSION (ML) NASAL DAILY PRN
Qty: 16 G | Refills: 0 | Status: SHIPPED | OUTPATIENT
Start: 2023-11-03 | End: 2024-02-16 | Stop reason: SDUPTHER

## 2023-11-03 RX ORDER — PAROXETINE 10 MG/1
10 TABLET, FILM COATED ORAL DAILY
Qty: 30 TABLET | Refills: 11 | Status: CANCELLED | OUTPATIENT
Start: 2023-11-03

## 2023-11-03 RX ORDER — FUROSEMIDE 40 MG/1
40 TABLET ORAL DAILY
Qty: 30 TABLET | Refills: 3 | Status: SHIPPED | OUTPATIENT
Start: 2023-11-03 | End: 2023-11-27

## 2023-11-03 RX ORDER — BENAZEPRIL HYDROCHLORIDE 40 MG/1
TABLET ORAL
Qty: 30 TABLET | Refills: 0 | Status: SHIPPED | OUTPATIENT
Start: 2023-11-03 | End: 2023-12-05 | Stop reason: SDUPTHER

## 2023-11-06 NOTE — PROGRESS NOTES
Please let the patient know that the x-ray of the upper back showed diffuse mild degenerative changes as expected and discussed in the office and an old fracture of thoracic vertebrae.  Thanks

## 2023-11-07 ENCOUNTER — TELEPHONE (OUTPATIENT)
Dept: FAMILY MEDICINE | Facility: CLINIC | Age: 73
End: 2023-11-07
Payer: MEDICARE

## 2023-11-07 NOTE — TELEPHONE ENCOUNTER
Attempted to call patient no answer unable to leave message.    --- Message from Ariadne Fritz MD sent at 11/3/2023  5:34 PM CDT -----  Normal chest x-ray.    Thanks

## 2023-11-08 ENCOUNTER — TELEPHONE (OUTPATIENT)
Dept: FAMILY MEDICINE | Facility: CLINIC | Age: 73
End: 2023-11-08
Payer: MEDICARE

## 2023-11-08 NOTE — TELEPHONE ENCOUNTER
----- Message from Ariadne Fritz MD sent at 11/6/2023 11:09 AM CST -----  Informed patient that the x-ray of the upper back showed diffuse mild degenerative changes as expected and discussed in the office and an old fracture of thoracic vertebrae.  Patient asked what do you recommend she do about the pain.

## 2023-11-08 NOTE — TELEPHONE ENCOUNTER
Attempted to call patient unable to leave voicemail.    ----- Message from Ariadne Fritz MD sent at 11/3/2023  5:34 PM CDT -----  Normal chest x-ray.    Thanks

## 2023-11-08 NOTE — TELEPHONE ENCOUNTER
----- Message from Ariadne Fritz MD sent at 11/3/2023  5:34 PM CDT -----  Informed patient of Normal chest x-ray.    Patient voiced understanding about results.

## 2023-11-16 DIAGNOSIS — Z12.31 ENCOUNTER FOR SCREENING MAMMOGRAM FOR MALIGNANT NEOPLASM OF BREAST: Primary | ICD-10-CM

## 2023-11-17 ENCOUNTER — CLINICAL SUPPORT (OUTPATIENT)
Dept: FAMILY MEDICINE | Facility: CLINIC | Age: 73
End: 2023-11-17
Payer: MEDICARE

## 2023-11-17 DIAGNOSIS — Z01.30 BLOOD PRESSURE CHECK: Primary | ICD-10-CM

## 2023-11-17 PROCEDURE — 99211 OFF/OP EST MAY X REQ PHY/QHP: CPT | Mod: PBBFAC

## 2023-11-17 NOTE — PROGRESS NOTES
The patient came in for a nurse visit for blood pressure check. She stated that she took her medication at 6:30-6:45A this morning. Her readings this morning are as follows: 164/67, 149/76, and 158/72. I advised the patient to continue to take her medication as prescribed and monitor. If any questions or concerns to the call the clinic.

## 2023-11-20 DIAGNOSIS — N18.9 CHRONIC KIDNEY DISEASE, UNSPECIFIED CKD STAGE: Primary | ICD-10-CM

## 2023-11-27 ENCOUNTER — TELEPHONE (OUTPATIENT)
Dept: NEPHROLOGY | Facility: CLINIC | Age: 73
End: 2023-11-27
Payer: MEDICARE

## 2023-11-27 ENCOUNTER — TELEPHONE (OUTPATIENT)
Dept: NEPHROLOGY | Facility: CLINIC | Age: 73
End: 2023-11-27

## 2023-11-27 ENCOUNTER — OFFICE VISIT (OUTPATIENT)
Dept: NEPHROLOGY | Facility: CLINIC | Age: 73
End: 2023-11-27
Payer: MEDICARE

## 2023-11-27 VITALS
TEMPERATURE: 98 F | HEIGHT: 66 IN | OXYGEN SATURATION: 98 % | BODY MASS INDEX: 29.79 KG/M2 | WEIGHT: 185.38 LBS | SYSTOLIC BLOOD PRESSURE: 138 MMHG | DIASTOLIC BLOOD PRESSURE: 68 MMHG | HEART RATE: 54 BPM | RESPIRATION RATE: 18 BRPM

## 2023-11-27 DIAGNOSIS — N18.31 CKD STAGE G3A/A1, GFR 45-59 AND ALBUMIN CREATININE RATIO <30 MG/G: Primary | ICD-10-CM

## 2023-11-27 DIAGNOSIS — Z23 FLU VACCINE NEED: ICD-10-CM

## 2023-11-27 DIAGNOSIS — I1A.0 RESISTANT HYPERTENSION: ICD-10-CM

## 2023-11-27 PROCEDURE — 3078F PR MOST RECENT DIASTOLIC BLOOD PRESSURE < 80 MM HG: ICD-10-PCS | Mod: CPTII,,, | Performed by: NURSE PRACTITIONER

## 2023-11-27 PROCEDURE — 1126F AMNT PAIN NOTED NONE PRSNT: CPT | Mod: CPTII,,, | Performed by: NURSE PRACTITIONER

## 2023-11-27 PROCEDURE — 4010F ACE/ARB THERAPY RXD/TAKEN: CPT | Mod: CPTII,,, | Performed by: NURSE PRACTITIONER

## 2023-11-27 PROCEDURE — 1101F PT FALLS ASSESS-DOCD LE1/YR: CPT | Mod: CPTII,,, | Performed by: NURSE PRACTITIONER

## 2023-11-27 PROCEDURE — G0008 ADMIN INFLUENZA VIRUS VAC: HCPCS | Mod: PBBFAC

## 2023-11-27 PROCEDURE — 1159F PR MEDICATION LIST DOCUMENTED IN MEDICAL RECORD: ICD-10-PCS | Mod: CPTII,,, | Performed by: NURSE PRACTITIONER

## 2023-11-27 PROCEDURE — 99204 PR OFFICE/OUTPT VISIT, NEW, LEVL IV, 45-59 MIN: ICD-10-PCS | Mod: S$PBB,,, | Performed by: NURSE PRACTITIONER

## 2023-11-27 PROCEDURE — 3075F PR MOST RECENT SYSTOLIC BLOOD PRESS GE 130-139MM HG: ICD-10-PCS | Mod: CPTII,,, | Performed by: NURSE PRACTITIONER

## 2023-11-27 PROCEDURE — 1101F PR PT FALLS ASSESS DOC 0-1 FALLS W/OUT INJ PAST YR: ICD-10-PCS | Mod: CPTII,,, | Performed by: NURSE PRACTITIONER

## 2023-11-27 PROCEDURE — 1126F PR PAIN SEVERITY QUANTIFIED, NO PAIN PRESENT: ICD-10-PCS | Mod: CPTII,,, | Performed by: NURSE PRACTITIONER

## 2023-11-27 PROCEDURE — 3066F PR DOCUMENTATION OF TREATMENT FOR NEPHROPATHY: ICD-10-PCS | Mod: CPTII,,, | Performed by: NURSE PRACTITIONER

## 2023-11-27 PROCEDURE — 3078F DIAST BP <80 MM HG: CPT | Mod: CPTII,,, | Performed by: NURSE PRACTITIONER

## 2023-11-27 PROCEDURE — 1159F MED LIST DOCD IN RCRD: CPT | Mod: CPTII,,, | Performed by: NURSE PRACTITIONER

## 2023-11-27 PROCEDURE — 99204 OFFICE O/P NEW MOD 45 MIN: CPT | Mod: S$PBB,,, | Performed by: NURSE PRACTITIONER

## 2023-11-27 PROCEDURE — 99215 OFFICE O/P EST HI 40 MIN: CPT | Mod: PBBFAC | Performed by: NURSE PRACTITIONER

## 2023-11-27 PROCEDURE — 3288F FALL RISK ASSESSMENT DOCD: CPT | Mod: CPTII,,, | Performed by: NURSE PRACTITIONER

## 2023-11-27 PROCEDURE — 3288F PR FALLS RISK ASSESSMENT DOCUMENTED: ICD-10-PCS | Mod: CPTII,,, | Performed by: NURSE PRACTITIONER

## 2023-11-27 PROCEDURE — 3044F PR MOST RECENT HEMOGLOBIN A1C LEVEL <7.0%: ICD-10-PCS | Mod: CPTII,,, | Performed by: NURSE PRACTITIONER

## 2023-11-27 PROCEDURE — 3066F NEPHROPATHY DOC TX: CPT | Mod: CPTII,,, | Performed by: NURSE PRACTITIONER

## 2023-11-27 PROCEDURE — 4010F PR ACE/ARB THEARPY RXD/TAKEN: ICD-10-PCS | Mod: CPTII,,, | Performed by: NURSE PRACTITIONER

## 2023-11-27 PROCEDURE — 3044F HG A1C LEVEL LT 7.0%: CPT | Mod: CPTII,,, | Performed by: NURSE PRACTITIONER

## 2023-11-27 PROCEDURE — 3075F SYST BP GE 130 - 139MM HG: CPT | Mod: CPTII,,, | Performed by: NURSE PRACTITIONER

## 2023-11-27 RX ORDER — SPIRONOLACTONE 25 MG/1
25 TABLET ORAL DAILY
Qty: 30 TABLET | Refills: 11 | Status: SHIPPED | OUTPATIENT
Start: 2023-11-27 | End: 2024-11-26

## 2023-11-27 NOTE — PROGRESS NOTES
"[facility]  Nephrology Clinic Note    Chief complaint: Chronic Kidney Disease (New pt, referred, took meds, did not do labs)    History of present illness:   Neris Contreras is a 73 y.o. Black or  female with past medical history of hypertension, chronic kidney, chronic constipation, dysphagia dyslipidemia, peripheral vascular disease, diabetes mellitus type 2, CARISSA, pulmonary hypertension, history of CVA, failure with preserved ejection fraction (followed by [facility] cardiology clinic) and obesity.  Patient presents to establish care in Nephrology Clinic. Reports strong family history of CKD (her son and her mother were on dialysis).     Review of Systems  12 point review of systems conducted, negative except as stated in the history of present illness.    Allergies: Patient is allergic to iodine, iodine and iodide containing products, and shellfish containing products.     Past Medical History:  has a past medical history of CVA (cerebral vascular accident), Depression, Diabetes mellitus, HLD (hyperlipidemia), Hypertension, Pulmonary HTN, and Sleep apnea.    Procedure History:  has a past surgical history that includes Leg Surgery (Right); Eye surgery; Cardiac surgery; Cardiac catheterization; Open reduction and internal fixation (ORIF) of injury of elbow; and Repair of eyelid (Left, 5/13/2022).    Family History: family history includes Diabetes in her mother; Kidney disease in her sister and son.    Social History:  reports that she has never smoked. She has never used smokeless tobacco. She reports current alcohol use of about 2.0 standard drinks of alcohol per week. She reports that she does not use drugs.    Physical exam  /68 (BP Location: Left arm, Patient Position: Sitting, BP Method: Large (Manual))   Pulse (!) 54 Comment: 55 apical  Temp 97.6 °F (36.4 °C) (Oral)   Resp 18   Ht 5' 5.75" (1.67 m)   Wt 84.1 kg (185 lb 6.4 oz)   SpO2 98%   BMI 30.15 kg/m² "   General appearance: Patient is in no acute distress.  Skin: No rashes or wounds.  HEENT: PERRLA, EOMI, no scleral icterus, no JVD. Neck is supple.  Chest: Respirations are unlabored. Lungs sounds are clear.   Heart: S1, S2.   Abdomen: Benign.  : Deferred.  Extremities: No edema, peripheral pulses are palpable.   Neuro: No focal deficits.     Home Medications:    Current Outpatient Medications:     albuterol (PROVENTIL/VENTOLIN HFA) 90 mcg/actuation inhaler, Inhale 2 puffs into the lungs every 4 (four) hours., Disp: , Rfl:     amLODIPine (NORVASC) 10 MG tablet, Take 1 tablet (10 mg total) by mouth once daily., Disp: 90 tablet, Rfl: 1    atorvastatin (LIPITOR) 20 MG tablet, Take 1 tablet (20 mg total) by mouth every evening., Disp: 90 tablet, Rfl: 1    benazepriL (LOTENSIN) 40 MG tablet, See Instructions, TAKE 1 TABLET BY MOUTH ONCE DAILY, # 90 tab(s), 3 Refill(s), Pharmacy: [facility], 168, cm, Height/Length Dosing, 07/15/21 10:50:00 CDT, 75.6, kg, Weight Dosing, 07/15/21 10:50:00 CDT, Disp: 30 tablet, Rfl: 0    clopidogreL (PLAVIX) 75 mg tablet, TAKE 1 TABLET BY MOUTH ONCE  DAILY, Disp: 30 tablet, Rfl: 11    ergocalciferol (ERGOCALCIFEROL) 50,000 unit Cap, Take 1 capsule (50,000 Units total) by mouth every 7 days., Disp: 12 capsule, Rfl: 0    fluticasone propionate (FLONASE) 50 mcg/actuation nasal spray, 1 spray (50 mcg total) by Each Nostril route daily as needed for Rhinitis., Disp: 16 g, Rfl: 0    gabapentin (NEURONTIN) 100 MG capsule, Take 1 capsule (100 mg total) by mouth 3 (three) times daily., Disp: 90 capsule, Rfl: 2    hydrALAZINE (APRESOLINE) 100 MG tablet, Take 1 tablet (100 mg total) by mouth every 12 (twelve) hours. (Patient taking differently: Take 100 mg by mouth every 12 (twelve) hours. Taking 50 mg), Disp: 60 tablet, Rfl: 2    linaCLOtide (LINZESS) 145 mcg Cap capsule, Take 1 capsule (145 mcg total) by mouth before breakfast., Disp: 90 capsule, Rfl: 1    pantoprazole (PROTONIX) 40 MG  tablet, Take 1 tablet (40 mg total) by mouth once daily., Disp: 30 tablet, Rfl: 3    TYRVAYA 0.03 mg/spray sprm, by Each Nostril route., Disp: , Rfl:     spironolactone (ALDACTONE) 25 MG tablet, Take 1 tablet (25 mg total) by mouth once daily., Disp: 30 tablet, Rfl: 11    Laboratory data    Serum  Lab Results   Component Value Date    WBC 5.7 03/06/2023    HGB 11.8 (L) 03/06/2023    HCT 38.0 03/06/2023     03/06/2023    FOLATE 9.4 10/19/2020    BLEYULZC71 733 12/09/2020     (H) 08/03/2020     04/11/2023    K 3.7 04/11/2023    CHLORIDE 110 (H) 04/11/2023    CO2 28 04/11/2023    BUN 13.3 04/11/2023    CREATININE 1.08 (H) 04/11/2023    EGFRNORACEVR 54 04/11/2023    GLUCOSE 119 (H) 04/11/2023    CALCIUM 10.2 04/11/2023    ALKPHOS 74 04/11/2023    LABPROT 7.4 04/11/2023    ALBUMIN 4.1 04/11/2023    BILIDIR 0.3 04/28/2022    IBILI 0.40 04/28/2022    AST 15 04/11/2023    ALT 12 04/11/2023    MG 1.80 03/06/2023      Lab Results   Component Value Date    HGBA1C 5.4 02/27/2023    .5 (H) 04/11/2023    IHUELAFI58ER 19.4 (L) 04/11/2023    HEPCAB Nonreactive 04/11/2023     Urine:  Lab Results   Component Value Date    COLORUA Yellow 03/06/2023    APPEARANCEUA Clear 03/06/2023    SGUA 1.025 03/06/2023    PHUA 5.5 03/06/2023    PROTEINUA Negative 03/06/2023    GLUCOSEUA Negative 03/06/2023    KETONESUA Negative 03/06/2023    BLOODUA Negative 03/06/2023    NITRITESUA Negative 03/06/2023    LEUKOCYTESUR Negative 03/06/2023    RBCUA <5 03/06/2023    WBCUA <5 03/06/2023    BACTERIA None Seen 03/06/2023    CREATRANDUR 143.1 (H) 05/08/2021         Imaging  Reviewed      Impression    ICD-10-CM ICD-9-CM   1. CKD stage G3a/A1, GFR 45-59 and albumin creatinine ratio <30 mg/g  N18.31 585.3   2. Resistant hypertension  I1A.0 401.9   3. BMI 30.0-30.9,adult  Z68.30 V85.30   4. Flu vaccine need  Z23 V04.81        Plan     CKD stage G3a/A1, GFR 45-59 and albumin creatinine ratio <30 mg/g  -     Ambulatory  referral/consult to Nephrology  -     spironolactone (ALDACTONE) 25 MG tablet; Take 1 tablet (25 mg total) by mouth once daily.  Dispense: 30 tablet; Refill: 11  -     [facility]; Future; Expected date: 12/18/2023  Etiology of kidney disease is likely multifactorial.  Patient has strong family history of chronic kidney disease, based on patient's medical history, family history, and previous test results, her risk to test positive for inherited cause of CKD is increased.   After discussing the risks, benefits, and limitations of genetic testing, patient elected to proceed with [facility] panel. Based on the KDIGO guidelines, patient meets the clinical criteria required for coverage of multi-gene panel test. The results could affect patient's clinical management recommendations.  Follow up in about 3 months (around 2/27/2024).  to discuss the results. Medical management recommendations will be made based on the result of patient's genetic analysis.  In the meantime continue risk factor management and TAMIKO blockade.  Will order kidney ultrasound to rule out structural abnormalities of kidneys and urinary tract.  Follow up in about 3 months (around 2/27/2024).    Resistant hypertension  -     Ambulatory referral/consult to Nephrology  Patient has history of hypokalemia difficult to control hypertension, which raises suspicion for primary hyperaldosteronism.  Will discontinue potassium supplementation and start spironolactone.    BMI 30.0-30.9,adult  Lifestyle and dietary interventions discussed, patient encouraged to maintain non-sedentary lifestyle and well-balanced diet.     Flu vaccine need  -     Influenza (FLUAD) - Quadrivalent (Adjuvanted) *Preferred* (65+) (PF)  Will administer flu vaccine today.            11/27/2023  Laura Davis NP  [facility]

## 2023-11-27 NOTE — TELEPHONE ENCOUNTER
----- Message from BRENDAN Chahal sent at 11/25/2023  9:14 AM CST -----  Regarding: RE: Urgent, pt has questions about food/ medication intake prior to appt 11/27/2023 Monday  Contact: @[phone]  Please advise the patient that she does not have to fast prior to her appointment.  No changes in food/fluid intake are necessary.  Thank you  ----- Message -----  From: Sowmya Stephenson  Sent: 11/24/2023   6:08 PM CST  To: [facility] Staff  Subject: Urgent, pt has questions about food/ medicat#    Regarding:Urgent, pt has questions about food/ medication intake prior to appt 11/27/2023 Monday    Contact: Neris    Type: Call back to advise    Who Called: Neris    Would the patient rather a call back or a response via [facility]? Phone call    Best Call Back Number: @[phone]

## 2023-11-27 NOTE — PROGRESS NOTES
VSS, patient tolerated Infleunza vaccine well; advised of possible pain and to call clinic with any issues.

## 2023-11-27 NOTE — TELEPHONE ENCOUNTER
----- Message from BRENDAN Chahal sent at 11/27/2023 10:17 AM CST -----  Please let the patient know that there were no concerning findings on lab results, kidney funtion is stable. Thank you

## 2023-11-28 ENCOUNTER — OFFICE VISIT (OUTPATIENT)
Dept: VASCULAR SURGERY | Facility: CLINIC | Age: 73
End: 2023-11-28
Payer: MEDICARE

## 2023-11-28 ENCOUNTER — TELEPHONE (OUTPATIENT)
Dept: NEPHROLOGY | Facility: CLINIC | Age: 73
End: 2023-11-28
Payer: MEDICARE

## 2023-11-28 VITALS
BODY MASS INDEX: 31.16 KG/M2 | RESPIRATION RATE: 19 BRPM | HEART RATE: 72 BPM | HEIGHT: 65 IN | OXYGEN SATURATION: 98 % | TEMPERATURE: 98 F | WEIGHT: 187 LBS | DIASTOLIC BLOOD PRESSURE: 61 MMHG | SYSTOLIC BLOOD PRESSURE: 128 MMHG

## 2023-11-28 DIAGNOSIS — I73.9 PAD (PERIPHERAL ARTERY DISEASE): Primary | ICD-10-CM

## 2023-11-28 PROBLEM — M79.675 TOE PAIN, BILATERAL: Status: ACTIVE | Noted: 2023-11-28

## 2023-11-28 PROBLEM — M79.674 TOE PAIN, BILATERAL: Status: ACTIVE | Noted: 2023-11-28

## 2023-11-28 PROCEDURE — 99215 OFFICE O/P EST HI 40 MIN: CPT | Mod: PBBFAC

## 2023-11-28 NOTE — PROGRESS NOTES
\[facility]\"" General Surgery Clinic Note    HPI: Neris Contreras is a 73 y.o. patient with PMHx of HTN, cerebral vascular accident, DM2, sleep apnea, and hyperlipidemia who presents to clinic for PAD evaluation. Patient reports pain on bilateral 3rd and 4th metatarsals and strange sensation of the rest of the toes as well as intermittent swelling of the feet. Denies fever, SOB, extremity weakness or pain. Able to walk multiple city blocks and up and down several flights of stairs without pain to the lower extremities.     PMH:   Past Medical History:   Diagnosis Date    CVA (cerebral vascular accident)     Depression     Diabetes mellitus     HLD (hyperlipidemia)     Hypertension     Pulmonary HTN     Sleep apnea       Meds:   Current Outpatient Medications:     amLODIPine (NORVASC) 10 MG tablet, Take 1 tablet (10 mg total) by mouth once daily., Disp: 90 tablet, Rfl: 1    atorvastatin (LIPITOR) 20 MG tablet, Take 1 tablet (20 mg total) by mouth every evening., Disp: 90 tablet, Rfl: 1    benazepriL (LOTENSIN) 40 MG tablet, See Instructions, TAKE 1 TABLET BY MOUTH ONCE DAILY, # 90 tab(s), 3 Refill(s), Pharmacy: [facility], 168, cm, Height/Length Dosing, 07/15/21 10:50:00 CDT, 75.6, kg, Weight Dosing, 07/15/21 10:50:00 CDT, Disp: 30 tablet, Rfl: 0    clopidogreL (PLAVIX) 75 mg tablet, TAKE 1 TABLET BY MOUTH ONCE  DAILY, Disp: 30 tablet, Rfl: 11    ergocalciferol (ERGOCALCIFEROL) 50,000 unit Cap, Take 1 capsule (50,000 Units total) by mouth every 7 days., Disp: 12 capsule, Rfl: 0    fluticasone propionate (FLONASE) 50 mcg/actuation nasal spray, 1 spray (50 mcg total) by Each Nostril route daily as needed for Rhinitis., Disp: 16 g, Rfl: 0    hydrALAZINE (APRESOLINE) 100 MG tablet, Take 1 tablet (100 mg total) by mouth every 12 (twelve) hours. (Patient taking differently: Take 100 mg by mouth every 12 (twelve) hours. Taking 50 mg), Disp: 60 tablet, Rfl: 2    linaCLOtide (LINZESS) 145 mcg Cap capsule, Take 1 capsule (145 mcg  total) by mouth before breakfast., Disp: 90 capsule, Rfl: 1    pantoprazole (PROTONIX) 40 MG tablet, Take 1 tablet (40 mg total) by mouth once daily., Disp: 30 tablet, Rfl: 3    spironolactone (ALDACTONE) 25 MG tablet, Take 1 tablet (25 mg total) by mouth once daily., Disp: 30 tablet, Rfl: 11    albuterol (PROVENTIL/VENTOLIN HFA) 90 mcg/actuation inhaler, Inhale 2 puffs into the lungs every 4 (four) hours., Disp: , Rfl:     gabapentin (NEURONTIN) 100 MG capsule, Take 1 capsule (100 mg total) by mouth 3 (three) times daily. (Patient not taking: Reported on 11/28/2023), Disp: 90 capsule, Rfl: 2    TYRVAYA 0.03 mg/spray sprm, by Each Nostril route., Disp: , Rfl:   Allergies:   Review of patient's allergies indicates:   Allergen Reactions    Iodine Hives     topical    Iodine and iodide containing products     Shellfish containing products      Other reaction(s): whelps/ itching     Social History:   Social History     Tobacco Use    Smoking status: Never    Smokeless tobacco: Never   Substance Use Topics    Alcohol use: Yes     Alcohol/week: 2.0 standard drinks of alcohol     Types: 1 Glasses of wine, 1 Cans of beer per week     Comment: social    Drug use: Never     Family History:   Family History   Problem Relation Age of Onset    Diabetes Mother     Kidney disease Sister     Kidney disease Son      Surgical History:   Past Surgical History:   Procedure Laterality Date    CARDIAC CATHETERIZATION      CARDIAC SURGERY      EYE SURGERY      LEG SURGERY Right     OPEN REDUCTION AND INTERNAL FIXATION (ORIF) OF INJURY OF ELBOW      REPAIR OF EYELID Left 5/13/2022    Procedure: REPAIR, EYELID;  Surgeon: sIaak Wang MD;  Location: [facility];  Service: ENT;  Laterality: Left;     Review of Systems:  Negative unless specified in HPI above.    Objective:    Vitals:  There were no vitals filed for this visit.     Physical Exam:  Gen: NAD  Neuro: awake, alert, answering questions appropriately  CV: RRR, +2 radial, DP, and PT  pulses bilaterally, Doppler biphasic signal on bilateral DP/PT  Resp: non-labored breathing, [place]  Ext: moves all 4 spontaneously and purposefully  Skin: warm, well perfused, slightly swollen feet.    Pertinent Labs:  CBC (11/27/23): Hgb (11.6L)  CMP (11/27/23): Glucose (160H), Creatinine (1.19H)    Imaging:  Transthoracic echo (09/27/23): Left Ventricle:  Mildly increased wall thickness. There is low normal systolic function with a visually estimated ejection fraction of 50 - 55%. Left Atrium: Left atrium is mildly dilated. Mitral Valve: There is mild regurgitation. Tricuspid Valve: There is mild regurgitation.     US bilateral Lower Extremities 11/21/2022:     No focal hemodynamically significant stenosis is identified in the bilateral lower extremity arterial system.     Abnormal monophasic waveforms in the bilateral dorsalis pedis arteries, and left anterior tibial and posterior tibial arteries.     Shadowing densities in the right knee popliteal fossa, possibly related to intra-articular bodies within a Baker cyst.    Micro/Path/Other:  None    Assessment/Plan:  Neris Contreras is a 73 y.o. patient with PMHx of HTN, cerebral vascular accident, DM2, sleep apnea, and hyperlipidemia who presents to clinic for PAD evaluation. Patient has palpable bilateral DP/PT pulses with easily identifiable biphasic doppler signals. No claudication symptoms at this time.     -PRN Follow-up    PORFIRIO Schulz  11/28/2023 2:47 PM

## 2023-11-28 NOTE — TELEPHONE ENCOUNTER
----- Message from BRENDAN Chahal sent at 11/28/2023  9:15 AM CST -----  Please let the patient know that I reviewed ultrasound results, there are no concerning findings.  Will discuss details during next office visit.

## 2023-12-04 VITALS — DIASTOLIC BLOOD PRESSURE: 76 MMHG | SYSTOLIC BLOOD PRESSURE: 149 MMHG

## 2023-12-05 DIAGNOSIS — I1A.0 RESISTANT HYPERTENSION: ICD-10-CM

## 2023-12-06 RX ORDER — BENAZEPRIL HYDROCHLORIDE 40 MG/1
40 TABLET ORAL DAILY
Qty: 60 TABLET | Refills: 0 | Status: SHIPPED | OUTPATIENT
Start: 2023-12-06 | End: 2024-01-29 | Stop reason: SDUPTHER

## 2024-01-24 DIAGNOSIS — I1A.0 RESISTANT HYPERTENSION: ICD-10-CM

## 2024-01-26 RX ORDER — HYDRALAZINE HYDROCHLORIDE 100 MG/1
100 TABLET, FILM COATED ORAL EVERY 12 HOURS
Qty: 60 TABLET | Refills: 0 | Status: SHIPPED | OUTPATIENT
Start: 2024-01-26

## 2024-01-29 DIAGNOSIS — I1A.0 RESISTANT HYPERTENSION: ICD-10-CM

## 2024-01-29 NOTE — TELEPHONE ENCOUNTER
----- Message from Rylie Carlson sent at 1/29/2024  2:49 PM CST -----  Regarding: Refill  Provider: Dr Dior Fritz  Preferred Pharmacy: [facility]  Last Visit:  Next Visit: 4/30/2024  Patient's Contact Number:    1. Name of Medication: Benazepril  Dosage: 40 mg tab  Comments:    2. Name of Medication:  Dosage:  Comments:    3. Name of Medication:  Dosage:  Comments    4. Name of Medication:  Dosage:  Comments:    5. Name of Medication:  Dosage:  Comments:

## 2024-01-30 RX ORDER — BENAZEPRIL HYDROCHLORIDE 40 MG/1
40 TABLET ORAL DAILY
Qty: 30 TABLET | Refills: 0 | Status: SHIPPED | OUTPATIENT
Start: 2024-01-30 | End: 2024-02-16 | Stop reason: SDUPTHER

## 2024-02-01 ENCOUNTER — OFFICE VISIT (OUTPATIENT)
Dept: GASTROENTEROLOGY | Facility: CLINIC | Age: 74
End: 2024-02-01
Payer: MEDICARE

## 2024-02-01 VITALS
HEIGHT: 66 IN | BODY MASS INDEX: 29.09 KG/M2 | TEMPERATURE: 98 F | WEIGHT: 181 LBS | DIASTOLIC BLOOD PRESSURE: 66 MMHG | HEART RATE: 65 BPM | OXYGEN SATURATION: 100 % | SYSTOLIC BLOOD PRESSURE: 128 MMHG

## 2024-02-01 DIAGNOSIS — R13.10 DYSPHAGIA, UNSPECIFIED TYPE: Primary | ICD-10-CM

## 2024-02-01 DIAGNOSIS — K59.09 CHRONIC CONSTIPATION: ICD-10-CM

## 2024-02-01 DIAGNOSIS — Z12.11 SCREENING FOR COLON CANCER: ICD-10-CM

## 2024-02-01 PROCEDURE — 3288F FALL RISK ASSESSMENT DOCD: CPT | Mod: CPTII,,, | Performed by: NURSE PRACTITIONER

## 2024-02-01 PROCEDURE — 1101F PT FALLS ASSESS-DOCD LE1/YR: CPT | Mod: CPTII,,, | Performed by: NURSE PRACTITIONER

## 2024-02-01 PROCEDURE — 1126F AMNT PAIN NOTED NONE PRSNT: CPT | Mod: CPTII,,, | Performed by: NURSE PRACTITIONER

## 2024-02-01 PROCEDURE — 99214 OFFICE O/P EST MOD 30 MIN: CPT | Mod: S$PBB,,, | Performed by: NURSE PRACTITIONER

## 2024-02-01 PROCEDURE — 99215 OFFICE O/P EST HI 40 MIN: CPT | Mod: PBBFAC | Performed by: NURSE PRACTITIONER

## 2024-02-01 PROCEDURE — 1160F RVW MEDS BY RX/DR IN RCRD: CPT | Mod: CPTII,,, | Performed by: NURSE PRACTITIONER

## 2024-02-01 PROCEDURE — 3078F DIAST BP <80 MM HG: CPT | Mod: CPTII,,, | Performed by: NURSE PRACTITIONER

## 2024-02-01 PROCEDURE — 1159F MED LIST DOCD IN RCRD: CPT | Mod: CPTII,,, | Performed by: NURSE PRACTITIONER

## 2024-02-01 PROCEDURE — 3074F SYST BP LT 130 MM HG: CPT | Mod: CPTII,,, | Performed by: NURSE PRACTITIONER

## 2024-02-01 NOTE — ASSESSMENT & PLAN NOTE
GERD lifestyle modifications  Reflux precautions  Limit NSAID use  EGD  Continue pantoprazole 40 mg daily   Call with updates  Follow-up clinic visit with NP in 6 months (after date of scheduled procedure)

## 2024-02-01 NOTE — PROGRESS NOTES
Subjective:       Patient ID: Neris Contreras is a 73 y.o. female.    Chief Complaint: Dysphagia (Patient states she has been having difficulty swallowing for over a year now. She states she has difficulty swallowing food, her medications, and even her salvia. She states every day is a struggle and she has had several choking episodes. She states she feels like there is an obstruction like something is blocking. )    This 73-year-old  female with CVA, depression, diabetes mellitus, hyperlipidemia, hypertension, and CARISSA is referred for constipation and dysphagia.  She presents unaccompanied.  She reports intermittent dysphagia over the past year described as something feeling stuck at the back of her throat.  She has difficulty swallowing liquids, solids, and medications and will have subsequent coughing and choking at times.  She has occasional nausea with episodes but denies subsequent vomiting.  Outside of episodes of eating or drinking anything, she will experience a globus sensation that she describes as a heaviness at the back of her throat.  She does take pantoprazole 40 mg daily and reports good relief of reflux symptoms with this medication.  Her appetite is good and her weight is stable.  She denies fever, chills, hematemesis, odynophagia, belching, bloating, early satiety, or abdominal pain.  She reports a longstanding history of chronic constipation and was prescribed Linzess 145 mcg daily over the past year with good relief noted.  She takes the medication daily and typically has 1 formed to soft stool daily.  She feels completely evacuated.  She denies melena, hematochezia, fecal urgency, fecal incontinence, or pain with defecation.    She reports having an EGD and colonoscopy several years ago.  Cologuard testing was negative February 24, 2023. She denies regular NSAID use or use of blood thinners. She denies tobacco use and drinks alcohol on occasion. She denies illicit drug use. She  denies a family history of IBD, colon polyps, or colon cancer.      Review of patient's allergies indicates:   Allergen Reactions    Iodine Hives     topical    Iodine and iodide containing products     Shellfish containing products      Other reaction(s): whelps/ itching     Past Medical History:   Diagnosis Date    CVA (cerebral vascular accident)     Depression     Diabetes mellitus     HLD (hyperlipidemia)     Hypertension     Pulmonary HTN     Sleep apnea      Past Surgical History:   Procedure Laterality Date    CARDIAC CATHETERIZATION      CARDIAC SURGERY      EYE SURGERY      LEG SURGERY Right     OPEN REDUCTION AND INTERNAL FIXATION (ORIF) OF INJURY OF ELBOW      REPAIR OF EYELID Left 5/13/2022    Procedure: REPAIR, EYELID;  Surgeon: Isaak Wang MD;  Location: [facility];  Service: ENT;  Laterality: Left;     Family History:   family history includes Diabetes in her mother; Kidney disease in her sister and son.    Social History:    reports that she has never smoked. She has never used smokeless tobacco. She reports current alcohol use of about 2.0 standard drinks of alcohol per week. She reports that she does not use drugs.    Review of Systems  Negative except as noted in the HPI.      Objective:      Physical Exam  Constitutional:       Appearance: Normal appearance.   HENT:      Head: Normocephalic.      Mouth/Throat:      Mouth: Mucous membranes are moist.   Eyes:      Extraocular Movements: Extraocular movements intact.      Conjunctiva/sclera: Conjunctivae normal.      Pupils: Pupils are equal, round, and reactive to light.   Cardiovascular:      Rate and Rhythm: Normal rate and regular rhythm.      Pulses: Normal pulses.      Heart sounds: Normal heart sounds.   Pulmonary:      Effort: Pulmonary effort is normal.      Breath sounds: Normal breath sounds.   Abdominal:      General: Bowel sounds are normal.      Palpations: Abdomen is soft.   Musculoskeletal:         General: Normal range of  motion.      Cervical back: Normal range of motion and neck supple.   Skin:     General: Skin is warm and dry.   Neurological:      General: No focal deficit present.      Mental Status: She is alert and oriented to person, place, and time.   Psychiatric:         Mood and Affect: Mood normal.         Behavior: Behavior normal.         Thought Content: Thought content normal.         Judgment: Judgment normal.         Home Medications:     Current Outpatient Medications   Medication Sig    albuterol (PROVENTIL/VENTOLIN HFA) 90 mcg/actuation inhaler Inhale 2 puffs into the lungs every 4 (four) hours.    amLODIPine (NORVASC) 10 MG tablet Take 1 tablet (10 mg total) by mouth once daily.    atorvastatin (LIPITOR) 20 MG tablet Take 1 tablet (20 mg total) by mouth every evening.    benazepriL (LOTENSIN) 40 MG tablet Take 1 tablet (40 mg total) by mouth once daily.    clopidogreL (PLAVIX) 75 mg tablet TAKE 1 TABLET BY MOUTH ONCE  DAILY    ergocalciferol (ERGOCALCIFEROL) 50,000 unit Cap Take 1 capsule (50,000 Units total) by mouth every 7 days.    fluticasone propionate (FLONASE) 50 mcg/actuation nasal spray 1 spray (50 mcg total) by Each Nostril route daily as needed for Rhinitis.    hydrALAZINE (APRESOLINE) 100 MG tablet TAKE 1 TABLET BY MOUTH EVERY 12 HOURS    linaCLOtide (LINZESS) 145 mcg Cap capsule Take 1 capsule (145 mcg total) by mouth before breakfast.    pantoprazole (PROTONIX) 40 MG tablet Take 1 tablet (40 mg total) by mouth once daily.    spironolactone (ALDACTONE) 25 MG tablet Take 1 tablet (25 mg total) by mouth once daily.    TYRVAYA 0.03 mg/spray sprm by Each Nostril route.    gabapentin (NEURONTIN) 100 MG capsule Take 1 capsule (100 mg total) by mouth 3 (three) times daily. (Patient not taking: Reported on 11/28/2023)     Laboratory Results:     Recent Results (from the past 2016 hour(s))   Vitamin D    Collection Time: 11/27/23  9:24 AM   Result Value Ref Range    Vit D 25 OH 24.9 (L) 30.0 - 80.0 ng/mL    Hemoglobin A1C    Collection Time: 11/27/23  9:24 AM   Result Value Ref Range    Hemoglobin A1c 6.7 <=7.0 %    Estimated Average Glucose 145.6 mg/dL   Comprehensive Metabolic Panel    Collection Time: 11/27/23  9:24 AM   Result Value Ref Range    Sodium Level 139 136 - 145 mmol/L    Potassium Level 4.4 3.5 - 5.1 mmol/L    Chloride 106 98 - 107 mmol/L    Carbon Dioxide 28 23 - 31 mmol/L    Glucose Level 160 (H) 82 - 115 mg/dL    Blood Urea Nitrogen 14.8 9.8 - 20.1 mg/dL    Creatinine 1.19 (H) 0.55 - 1.02 mg/dL    Calcium Level Total 9.9 8.4 - 10.2 mg/dL    Protein Total 7.5 5.8 - 7.6 gm/dL    Albumin Level 4.1 3.4 - 4.8 g/dL    Globulin 3.4 2.4 - 3.5 gm/dL    Albumin/Globulin Ratio 1.2 1.1 - 2.0 ratio    Bilirubin Total 0.7 <=1.5 mg/dL    Alkaline Phosphatase 77 40 - 150 unit/L    Alanine Aminotransferase 12 0 - 55 unit/L    Aspartate Aminotransferase 16 5 - 34 unit/L    eGFR 48 mls/min/1.73/m2   CBC with Differential    Collection Time: 11/27/23  9:24 AM   Result Value Ref Range    WBC 5.01 4.50 - 11.50 x10(3)/mcL    RBC 4.28 4.20 - 5.40 x10(6)/mcL    Hgb 11.6 (L) 12.0 - 16.0 g/dL    Hct 38.1 37.0 - 47.0 %    MCV 89.0 80.0 - 94.0 fL    MCH 27.1 27.0 - 31.0 pg    MCHC 30.4 (L) 33.0 - 36.0 g/dL    RDW 12.8 11.5 - 17.0 %    Platelet 248 130 - 400 x10(3)/mcL    MPV 10.3 7.4 - 10.4 fL    Neut % 54.7 %    Lymph % 31.3 %    Mono % 9.0 %    Eos % 4.2 %    Basophil % 0.6 %    Lymph # 1.57 0.6 - 4.6 x10(3)/mcL    Neut # 2.74 2.1 - 9.2 x10(3)/mcL    Mono # 0.45 0.1 - 1.3 x10(3)/mcL    Eos # 0.21 0 - 0.9 x10(3)/mcL    Baso # 0.03 <=0.2 x10(3)/mcL    IG# 0.01 0 - 0.04 x10(3)/mcL    IG% 0.2 %    NRBC% 0.0 %   Protein/Creatinine Ratio, Urine    Collection Time: 11/27/23  9:41 AM   Result Value Ref Range    Urine Protein Level <6.8 mg/dL    Urine Creatinine 48.0 45.0 - 106.0 mg/dL   Urinalysis, Reflex to Urine Culture    Collection Time: 11/27/23  9:41 AM    Specimen: Urine   Result Value Ref Range    Color, UA Colorless (A)  Yellow, Light-Yellow, Dark Yellow, Dari, Straw    Appearance, UA Clear Clear    Specific Gravity, UA 1.006 1.005 - 1.030    pH, UA 5.5 5.0 - 8.5    Protein, UA Negative Negative    Glucose, UA Normal Negative, Normal    Ketones, UA Negative Negative    Blood, UA Negative Negative    Bilirubin, UA Negative Negative    Urobilinogen, UA Normal 0.2, 1.0, Normal    Nitrites, UA Negative Negative    Leukocyte Esterase, UA Negative Negative    WBC, UA 0-5 None Seen, 0-2, 3-5, 0-5 /HPF    Bacteria, UA Trace (A) None Seen /HPF    Squamous Epithelial Cells, UA Trace (A) None Seen /HPF    Hyaline Casts, UA None Seen None Seen /lpf    RBC, UA 0-5 None Seen, 0-2, 3-5, 0-5 /HPF     Assessment/Plan:     Problem List Items Addressed This Visit          GI    Dysphagia - Primary     GERD lifestyle modifications  Reflux precautions  Limit NSAID use  EGD  Continue pantoprazole 40 mg daily   Call with updates  Follow-up clinic visit with NP in 6 months (after date of scheduled procedure)         Relevant Orders    Case Request Endoscopy: EGD (ESOPHAGOGASTRODUODENOSCOPY) (Completed)    Chronic constipation     Recommend soluble fiber supplementation  Avoid straining or sitting on the toilet for long periods of time  Continue Linzess 145 mcg daily         Screening for colon cancer     Cologuard testing was negative February 24, 2023.

## 2024-02-01 NOTE — ASSESSMENT & PLAN NOTE
Recommend soluble fiber supplementation  Avoid straining or sitting on the toilet for long periods of time  Continue Linzess 145 mcg daily

## 2024-02-16 ENCOUNTER — OFFICE VISIT (OUTPATIENT)
Dept: FAMILY MEDICINE | Facility: CLINIC | Age: 74
End: 2024-02-16
Payer: MEDICARE

## 2024-02-16 VITALS
HEART RATE: 68 BPM | TEMPERATURE: 98 F | SYSTOLIC BLOOD PRESSURE: 126 MMHG | HEIGHT: 66 IN | DIASTOLIC BLOOD PRESSURE: 77 MMHG | OXYGEN SATURATION: 100 % | BODY MASS INDEX: 29.51 KG/M2 | RESPIRATION RATE: 18 BRPM | WEIGHT: 183.63 LBS

## 2024-02-16 DIAGNOSIS — J30.2 SEASONAL ALLERGIES: ICD-10-CM

## 2024-02-16 DIAGNOSIS — E55.9 VITAMIN D DEFICIENCY: ICD-10-CM

## 2024-02-16 DIAGNOSIS — I1A.0 RESISTANT HYPERTENSION: Primary | ICD-10-CM

## 2024-02-16 DIAGNOSIS — R73.01 ELEVATED FASTING GLUCOSE: ICD-10-CM

## 2024-02-16 DIAGNOSIS — Z86.73 HISTORY OF CVA IN ADULTHOOD: ICD-10-CM

## 2024-02-16 PROCEDURE — 3074F SYST BP LT 130 MM HG: CPT | Mod: CPTII,,, | Performed by: FAMILY MEDICINE

## 2024-02-16 PROCEDURE — 1159F MED LIST DOCD IN RCRD: CPT | Mod: CPTII,,, | Performed by: FAMILY MEDICINE

## 2024-02-16 PROCEDURE — 1101F PT FALLS ASSESS-DOCD LE1/YR: CPT | Mod: CPTII,,, | Performed by: FAMILY MEDICINE

## 2024-02-16 PROCEDURE — 3078F DIAST BP <80 MM HG: CPT | Mod: CPTII,,, | Performed by: FAMILY MEDICINE

## 2024-02-16 PROCEDURE — 99214 OFFICE O/P EST MOD 30 MIN: CPT | Mod: S$PBB,,, | Performed by: FAMILY MEDICINE

## 2024-02-16 PROCEDURE — 1126F AMNT PAIN NOTED NONE PRSNT: CPT | Mod: CPTII,,, | Performed by: FAMILY MEDICINE

## 2024-02-16 PROCEDURE — 99214 OFFICE O/P EST MOD 30 MIN: CPT | Mod: PBBFAC | Performed by: FAMILY MEDICINE

## 2024-02-16 PROCEDURE — 3288F FALL RISK ASSESSMENT DOCD: CPT | Mod: CPTII,,, | Performed by: FAMILY MEDICINE

## 2024-02-16 RX ORDER — BENAZEPRIL HYDROCHLORIDE 40 MG/1
40 TABLET ORAL DAILY
Qty: 90 TABLET | Refills: 1 | Status: SHIPPED | OUTPATIENT
Start: 2024-02-16

## 2024-02-16 RX ORDER — FLUTICASONE PROPIONATE 50 MCG
1 SPRAY, SUSPENSION (ML) NASAL DAILY PRN
Qty: 16 G | Refills: 0 | Status: SHIPPED | OUTPATIENT
Start: 2024-02-16

## 2024-02-16 RX ORDER — CLOPIDOGREL BISULFATE 75 MG/1
75 TABLET ORAL DAILY
Qty: 30 TABLET | Refills: 11 | Status: SHIPPED | OUTPATIENT
Start: 2024-02-16

## 2024-02-16 NOTE — PROGRESS NOTES
Patient Name: Neris Contreras   : 1950  MRN: [id number]     SUBJECTIVE:  Neris Contreras is a 73 y.o. female here for Follow-up (Routine follow up, refill on Plavix. Patient states she thinks Tyrvaya is making her dizzy.)  .    HPI  Here for routine follow up.  Feels well overall.  Taking benzepril 40, amlodipine 10 mg, hydralazine increased last visit to 100 mg bid from 50 bid. Saw nephrology- added spironolactone and stopped lasix and potassium.  Blood pressure seems to be well-controlled from these changes.  Reports compliance to medications.  On Plavix for history of CVA.  No hematuria, no melena or hematochezia.  No numbness or weakness anywhere.  Feels well.    Regarding dysphagia, compliant with Protonix 40 mg daily.  Has EGD scheduled in July.  Continues to have dysphagia.  No melena or hematochezia.  Would like to go back to Flonase nasal.  Uses it for seasonal allergies. Tyrvaya nasal used for her dry eyes last year as ordered from eye doctor.      ALLERGIES:   Review of patient's allergies indicates:   Allergen Reactions    Iodine Hives     topical    Iodine and iodide containing products     Shellfish containing products      Other reaction(s): whelps/ itching         ROS:  Review of Systems   Constitutional:  Negative for chills and fever.   HENT:  Negative for congestion.    Respiratory:  Negative for cough and shortness of breath.    Cardiovascular:  Negative for chest pain, palpitations and leg swelling.   Gastrointestinal:  Negative for abdominal pain, blood in stool, diarrhea, nausea and vomiting.   Genitourinary:  Negative for dysuria and hematuria.   Neurological:  Negative for headaches.   Psychiatric/Behavioral:  Negative for depression. The patient is not nervous/anxious.          OBJECTIVE:  Vital signs  Vitals:    24 0801   BP: 126/77   BP Location: Left arm   Patient Position: Sitting   BP Method: Large (Automatic)   Pulse: 68   Resp: 18   Temp: 98.2 °F (36.8 °C)   TempSrc: Oral  "  SpO2: 100%   Weight: 83.3 kg (183 lb 9.6 oz)   Height: 5' 6" (1.676 m)      Body mass index is 29.63 kg/m².    PHYSICAL EXAM:   Physical Exam  Vitals reviewed.   Constitutional:       General: She is not in acute distress.     Appearance: Normal appearance. She is not ill-appearing.   HENT:      Head: Normocephalic and atraumatic.      Right Ear: External ear normal.      Left Ear: External ear normal.      Nose: Nose normal.      Mouth/Throat:      Mouth: Mucous membranes are moist.   Eyes:      General: No scleral icterus.        Right eye: No discharge.         Left eye: No discharge.      Conjunctiva/sclera: Conjunctivae normal.      Pupils: Pupils are equal, round, and reactive to light.   Cardiovascular:      Rate and Rhythm: Normal rate and regular rhythm.   Pulmonary:      Effort: Pulmonary effort is normal. No respiratory distress.      Breath sounds: No wheezing, rhonchi or rales.   Abdominal:      General: Bowel sounds are normal. There is no distension.      Palpations: Abdomen is soft.      Tenderness: There is no abdominal tenderness.   Musculoskeletal:      Cervical back: Normal range of motion and neck supple. No rigidity or tenderness.      Right lower leg: No edema.      Left lower leg: No edema.   Skin:     General: Skin is warm.      Coloration: Skin is not pale.      Findings: No rash.   Neurological:      General: No focal deficit present.      Mental Status: She is alert and oriented to person, place, and time.   Psychiatric:         Mood and Affect: Mood normal.         Behavior: Behavior normal.          ASSESSMENT/PLAN:  1. Resistant hypertension  Well-controlled with the latest changes.  Continue with benazepril 40 mg daily, amlodipine 10 mg daily, hydralazine 100 mg b.i.d. and spironolactone  Continue to follow up with Cardiology and Nephrology.  -     benazepriL (LOTENSIN) 40 MG tablet; Take 1 tablet (40 mg total) by mouth once daily.  Dispense: 90 tablet; Refill: 1  -     Lipid Panel; " Future; Expected date: 02/16/2024  -     Hemoglobin A1C; Future; Expected date: 02/16/2024  -     TSH; Future; Expected date: 02/16/2024    2. History of CVA in adulthood  No new symptoms or deficits.  Continue with Plavix and statin.  -     clopidogreL (PLAVIX) 75 mg tablet; Take 1 tablet (75 mg total) by mouth once daily.  Dispense: 30 tablet; Refill: 11    3. Seasonal allergies  Used to be stable on Flonase.  Okay to resume.  -     fluticasone propionate (FLONASE) 50 mcg/actuation nasal spray; 1 spray (50 mcg total) by Each Nostril route daily as needed for Rhinitis.  Dispense: 16 g; Refill: 0    4. Elevated fasting glucose  -     Hemoglobin A1C; Future; Expected date: 02/16/2024    5. Vitamin D deficiency  -     Vitamin D; Future; Expected date: 02/16/2024               Previous medical history/lab work/radiology reviewed and considered during medical management decisions.   Medication list reviewed and medication reconciliation performed.  Patient was provided  and care about his/her current diagnosis (es) and medications including risk/benefit and side effects/adverse events, over the counter medication uses/doses, home self-care and contact precautions,  and red flags and indications for when to seek immediate medical attention.   Patient was advised to continue compliance with current medication list and medical recommendations.  Recommended/ Advised continued compliance with recommended eating habits/ diets for medical conditions and exercise 150 minutes/ week (if possible) for medical condition (s).        RESULTS:  No results found for this or any previous visit (from the past 1008 hour(s)).      Follow Up:  Follow up in about 6 months (around 8/16/2024).     This note was created with the assistance of a voice recognition software or phone dictation. There may be transcription errors as a result of using this technology however minimal. Effort has been made to assure accuracy of transcription but  any obvious errors or omissions should be clarified with the author of the document

## 2024-02-16 NOTE — PROGRESS NOTES
ADDENDUM 4.26.24  CARDIAC RISK ASSESSMENT  According to the Revised Cardiac Risk Index, patient is considered to be low to intermediate risk (RCRI score=1 point for history of TIA/CVA= 6.0% risk) for low risk GI procedure/colonoscopy with IV conscious sedation.  She is moderate risk to hold Plavix.  If Plavix needs to be held, recommend holding for 5 days prior to procedure and resuming as soon as possible once safe from a surgical standpoint.  Aspirin may be continued.    CHIEF COMPLAINT:   No chief complaint on file.                                                 HPI:  Neris Contreras 73 y.o. female with a PMH significant for mild pulm HTN (WHO class II), TIA, CARISSA, HTN who presents to cardiology clinic for follow up and ongoing care.  Patient completed a 14 day cardiac event monitor in March 2023 which revealed underlying rhythm is sinus, no significant arrhythmias or pauses noted, during very symptoms the patient was in normal sinus rhythm or sinus tachycardia (see full report below).  At last office visit patient endorsed ongoing shortness of breath and dyspnea on exertion, as well as bilateral lower extremity edema.  Therefore, echocardiogram was ordered for re-evaluation.  Echo completed in September 2023 revealed EF of 50-55%, mild MR, mild TR, normal systolic function, normal diastolic function.  See full report below.    Today the patient states that she was feeling well overall.  From a cardiac standpoint, she states that she is feeling fine and denies any cardiac complaints.  She denies any chest pain, SOB, BLACKMAN, palpitations, PND, orthopnea, lightheadedness, dizziness, syncope, claudication symptoms, or peripheral edema.  She states that her peripheral edema is intermittent, but it has been much better controlled over the last 6 months.  She states that she is able to complete her ADLs without any issues or ischemic symptoms.  She states that she is active in her day-to-day life and stays busy.  She  states that she tries to go to the gym for exercise at least 2-3 days per week.  She reports compliance with all her current medications.  She denies any tobacco or other illicit drug use.                                                                                                                                                                                                                                                                                                                                                                                                                                                                          CARDIAC TESTING:  TTE 9.27.23    Left Ventricle: The left ventricle is normal in size. Mildly increased wall thickness. There is low normal systolic function with a visually estimated ejection fraction of 50 - 55%.    Left Atrium: Left atrium is mildly dilated.    Right Ventricle: Normal right ventricular cavity size. Systolic function is normal.    Mitral Valve: There is mild regurgitation.    Tricuspid Valve: There is mild regurgitation.    IVC/SVC: Normal venous pressure at 3 mmHg.     14 Day Cardiac Event Monitor 3.14.23-3.27.23  Underlying rhythm is sinus   No significant arrhythmia noted   During very symptoms, the patient is either in normal sinus rhythm or sinus tachycardia with heart rate up to 102  Average heart rate 69 BPM, minimum heart rate 41 BPM, max heart rate 120 BPM    EKG (3/9/2023):  Sinus bradycardia, LVH     TTE (2019):  Summary  Borderline Concentric left ventricular hypertrophy.  Left ventricular ejection fraction is measured at approximately 60%.     LHC/[facility]ors (2017):  No CAD (no report found)    Patient Active Problem List   Diagnosis    Resistant hypertension    Bradycardia    Chronic kidney disease    Prediabetes    Pulmonary hypertension    Bilateral lower extremity edema    Shortness of breath    Chronic constipation    Hot flashes due to menopause     Chronic midline thoracic back pain    Hyperlipidemia    PAD (peripheral artery disease)    Toe pain, bilateral    Dysphagia    Screening for colon cancer     Past Surgical History:   Procedure Laterality Date    CARDIAC CATHETERIZATION      CARDIAC SURGERY      EYE SURGERY      LEG SURGERY Right     OPEN REDUCTION AND INTERNAL FIXATION (ORIF) OF INJURY OF ELBOW      REPAIR OF EYELID Left 5/13/2022    Procedure: REPAIR, EYELID;  Surgeon: Isaak Wang MD;  Location: [facility];  Service: ENT;  Laterality: Left;     Social History     Socioeconomic History    Marital status:    Tobacco Use    Smoking status: Never    Smokeless tobacco: Never   Substance and Sexual Activity    Alcohol use: Yes     Alcohol/week: 2.0 standard drinks of alcohol     Types: 1 Glasses of wine, 1 Cans of beer per week     Comment: social    Drug use: Never     Social Determinants of Health     Financial Resource Strain: Low Risk  (2/16/2024)    Overall Financial Resource Strain (CARDIA)     Difficulty of Paying Living Expenses: Not hard at all   Food Insecurity: No Food Insecurity (2/16/2024)    Hunger Vital Sign     Worried About Running Out of Food in the Last Year: Never true     Ran Out of Food in the Last Year: Never true   Transportation Needs: Unmet Transportation Needs (2/16/2024)    PRAPARE - Transportation     Lack of Transportation (Medical): Yes     Lack of Transportation (Non-Medical): Yes   Physical Activity: Sufficiently Active (2/16/2024)    Exercise Vital Sign     Days of Exercise per Week: 3 days     Minutes of Exercise per Session: 60 min   Stress: No Stress Concern Present (2/16/2024)    [employer] [place] [employer] - Occupational Stress Questionnaire     Feeling of Stress : Not at all   Social Connections: Moderately Isolated (2/16/2024)    Social Connection and Isolation Panel [NHANES]     Frequency of Communication with Friends and Family: More than three times a week     Frequency of Social  Gatherings with Friends and Family: Three times a week     Attends Quaker Services: More than 4 times per year     Active Member of Clubs or Organizations: No     Attends Club or Organization Meetings: Never     Marital Status:    Housing Stability: High Risk (2/16/2024)    Housing Stability Vital Sign     Unable to Pay for Housing in the Last Year: Yes     Number of Places Lived in the Last Year: 1     Unstable Housing in the Last Year: Yes        Family History   Problem Relation Age of Onset    Diabetes Mother     Kidney disease Sister     Kidney disease Son      Review of patient's allergies indicates:   Allergen Reactions    Iodine Hives     topical    Iodine and iodide containing products     Shellfish containing products      Other reaction(s): whelps/ itching         ROS:  Review of Systems   Constitutional: Negative.    HENT: Negative.     Eyes: Negative.    Respiratory:  Negative for shortness of breath (With over exertion).    Cardiovascular:  Negative for chest pain, palpitations, orthopnea, claudication, leg swelling (Intermittent) and PND.   Gastrointestinal: Negative.    Genitourinary: Negative.    Musculoskeletal: Negative.    Skin: Negative.    Neurological: Negative.  Negative for dizziness and weakness.   Endo/Heme/Allergies: Negative.    Psychiatric/Behavioral: Negative.                                                                                                                                                                                  Negative except as stated in the history of present illness. See HPI for details.    PHYSICAL EXAM:  There were no vitals taken for this visit.      Physical Exam  HENT:      Head: Normocephalic.      Nose: Nose normal.      Mouth/Throat:      Mouth: Mucous membranes are moist.   Eyes:      Extraocular Movements: Extraocular movements intact.   Cardiovascular:      Rate and Rhythm: Normal rate and regular rhythm.   Pulmonary:      Effort:  Pulmonary effort is normal.   Abdominal:      General: There is no distension.      Palpations: Abdomen is soft.      Tenderness: There is no abdominal tenderness.   Musculoskeletal:         General: Normal range of motion.      Cervical back: Normal range of motion.      Right lower leg: No edema (Intermittent).      Left lower leg: No edema (Intermittent).   Skin:     General: Skin is warm.   Neurological:      General: No focal deficit present.      Mental Status: She is alert.   Psychiatric:         Mood and Affect: Mood normal.         Current Outpatient Medications   Medication Instructions    albuterol (PROVENTIL/VENTOLIN HFA) 90 mcg/actuation inhaler 2 puffs, Inhalation, Every 4 hours    amLODIPine (NORVASC) 10 mg, Oral, Daily    atorvastatin (LIPITOR) 20 mg, Oral, Nightly    benazepriL (LOTENSIN) 40 mg, Oral, Daily    clopidogreL (PLAVIX) 75 mg, Oral, Daily    ergocalciferol (ERGOCALCIFEROL) 50,000 Units, Oral, Every 7 days    fluticasone propionate (FLONASE) 50 mcg, Each Nostril, Daily PRN    gabapentin (NEURONTIN) 100 mg, Oral, 3 times daily    hydrALAZINE (APRESOLINE) 100 mg, Oral, Every 12 hours    linaCLOtide (LINZESS) 145 mcg, Oral, Before breakfast    pantoprazole (PROTONIX) 40 mg, Oral, Daily    spironolactone (ALDACTONE) 25 mg, Oral, Daily    TYRVAYA 0.03 mg/spray sprm Each Nostril        All medications, laboratory studies, cardiac diagnostic imaging reviewed.     Lab Results   Component Value Date    LDL 68.00 02/27/2023    LDL 61.00 05/08/2021    TRIG 63 02/27/2023    TRIG 65 05/08/2021    CREATININE 1.19 (H) 11/27/2023    MG 1.80 03/06/2023    K 4.4 11/27/2023        ASSESSMENT/PLAN:  73 y.o. female with the following medical problems:     SOB/BLACKMAN  Bilateral Lower Extremity Edema  HFpEF  - Reports stable SOB/BLACKMAN. She states that it has actually improved from last visit   - Intermittent BLE Edema - also states that it has improved since last visit.  - History of pulmonary hypertension  - Most  recent echocardiogram completed in September 2023 revealed preserved EF of 50-55%, normal systolic function, normal diastolic function, mild TR, mild MR.  See full report above  - Reported history of HFpEF, most recent echo with EF 60% in 2021  - Currently taking hydralazine 100 BID, Spironolactone 25, Benazepril 40, and Amlodipine 10   - Tight BP control   - Advised on low-sodium diet (<2g daily), compression stockings, leg elevation    CVA  - History of CVA/TIA  - Continue plavix, statin  - Denies any further stroke events or symptoms     HTN  - BP at goal today - 129/66  - Continue current regimen as listed above  - Counseled on low-sodium, heart healthy diet and exercise as tolerated    Sinus Bradycardia  - HR 56 BPM today   - 14 Day Holter- underlying sinus rhythm, no significant arrhythmias, no pauses, during symptoms, patient either in normal sinus rhythm or sinus tachy; average heart rate 69  - Denies any bradycardic symptoms     CARISSA  - Patient states that she believes she was diagnosed with CARISSA in the past, however she does not wear a CPAP at night due to inability to tolerate    Dysphagia  - Patient states that she has been having difficulty swallowing - states that she is scheduled for a diagnostic EGD and colonoscopy in approximately 4 months  - Follows closely with PCP and GI       Complete lab work ordered per primary care   Follow up in cardiology clinic in 6 months or sooner if needed   Follow up with PCP as directed  Please call clinic for any questions or concerns

## 2024-02-21 DIAGNOSIS — R13.10 DYSPHAGIA, UNSPECIFIED TYPE: ICD-10-CM

## 2024-02-21 RX ORDER — PANTOPRAZOLE SODIUM 40 MG/1
40 TABLET, DELAYED RELEASE ORAL DAILY
Qty: 30 TABLET | Refills: 3 | Status: SHIPPED | OUTPATIENT
Start: 2024-02-21 | End: 2025-02-20

## 2024-02-22 ENCOUNTER — LAB VISIT (OUTPATIENT)
Dept: LAB | Facility: HOSPITAL | Age: 74
End: 2024-02-22
Attending: NURSE PRACTITIONER
Payer: MEDICARE

## 2024-02-22 ENCOUNTER — OFFICE VISIT (OUTPATIENT)
Dept: CARDIOLOGY | Facility: CLINIC | Age: 74
End: 2024-02-22
Payer: MEDICARE

## 2024-02-22 VITALS
HEART RATE: 56 BPM | RESPIRATION RATE: 18 BRPM | OXYGEN SATURATION: 99 % | SYSTOLIC BLOOD PRESSURE: 129 MMHG | BODY MASS INDEX: 28.6 KG/M2 | HEIGHT: 66 IN | DIASTOLIC BLOOD PRESSURE: 66 MMHG | TEMPERATURE: 98 F | WEIGHT: 177.94 LBS

## 2024-02-22 DIAGNOSIS — R00.1 BRADYCARDIA: ICD-10-CM

## 2024-02-22 DIAGNOSIS — I73.9 PAD (PERIPHERAL ARTERY DISEASE): ICD-10-CM

## 2024-02-22 DIAGNOSIS — R60.0 BILATERAL LOWER EXTREMITY EDEMA: ICD-10-CM

## 2024-02-22 DIAGNOSIS — I27.20 PULMONARY HYPERTENSION: ICD-10-CM

## 2024-02-22 DIAGNOSIS — N18.31 CKD STAGE G3A/A1, GFR 45-59 AND ALBUMIN CREATININE RATIO <30 MG/G: ICD-10-CM

## 2024-02-22 DIAGNOSIS — I1A.0 RESISTANT HYPERTENSION: Primary | ICD-10-CM

## 2024-02-22 DIAGNOSIS — E78.5 HYPERLIPIDEMIA, UNSPECIFIED HYPERLIPIDEMIA TYPE: ICD-10-CM

## 2024-02-22 LAB
ALBUMIN SERPL-MCNC: 4.2 G/DL (ref 3.4–4.8)
ALBUMIN/GLOB SERPL: 1.2 RATIO (ref 1.1–2)
ALP SERPL-CCNC: 68 UNIT/L (ref 40–150)
ALT SERPL-CCNC: 11 UNIT/L (ref 0–55)
APPEARANCE UR: CLEAR
AST SERPL-CCNC: 15 UNIT/L (ref 5–34)
BACTERIA #/AREA URNS AUTO: ABNORMAL /HPF
BASOPHILS # BLD AUTO: 0.04 X10(3)/MCL
BASOPHILS NFR BLD AUTO: 0.8 %
BILIRUB SERPL-MCNC: 0.8 MG/DL
BILIRUB UR QL STRIP.AUTO: NEGATIVE
BUN SERPL-MCNC: 13.9 MG/DL (ref 9.8–20.1)
CALCIUM SERPL-MCNC: 10.1 MG/DL (ref 8.4–10.2)
CHLORIDE SERPL-SCNC: 105 MMOL/L (ref 98–107)
CO2 SERPL-SCNC: 26 MMOL/L (ref 23–31)
COLOR UR AUTO: YELLOW
CREAT SERPL-MCNC: 1.37 MG/DL (ref 0.55–1.02)
CREAT UR-MCNC: 369.6 MG/DL (ref 45–106)
EOSINOPHIL # BLD AUTO: 0.1 X10(3)/MCL (ref 0–0.9)
EOSINOPHIL NFR BLD AUTO: 2 %
ERYTHROCYTE [DISTWIDTH] IN BLOOD BY AUTOMATED COUNT: 13.3 % (ref 11.5–17)
GFR SERPLBLD CREATININE-BSD FMLA CKD-EPI: 40 MLS/MIN/1.73/M2
GLOBULIN SER-MCNC: 3.5 GM/DL (ref 2.4–3.5)
GLUCOSE SERPL-MCNC: 111 MG/DL (ref 82–115)
GLUCOSE UR QL STRIP.AUTO: NORMAL
HCT VFR BLD AUTO: 34.6 % (ref 37–47)
HGB BLD-MCNC: 10.9 G/DL (ref 12–16)
HYALINE CASTS #/AREA URNS LPF: >20 /LPF
IMM GRANULOCYTES # BLD AUTO: 0.03 X10(3)/MCL (ref 0–0.04)
IMM GRANULOCYTES NFR BLD AUTO: 0.6 %
KETONES UR QL STRIP.AUTO: NEGATIVE
LEUKOCYTE ESTERASE UR QL STRIP.AUTO: NEGATIVE
LYMPHOCYTES # BLD AUTO: 1.77 X10(3)/MCL (ref 0.6–4.6)
LYMPHOCYTES NFR BLD AUTO: 34.6 %
MCH RBC QN AUTO: 28.2 PG (ref 27–31)
MCHC RBC AUTO-ENTMCNC: 31.5 G/DL (ref 33–36)
MCV RBC AUTO: 89.6 FL (ref 80–94)
MONOCYTES # BLD AUTO: 0.4 X10(3)/MCL (ref 0.1–1.3)
MONOCYTES NFR BLD AUTO: 7.8 %
MUCOUS THREADS URNS QL MICRO: ABNORMAL /LPF
NEUTROPHILS # BLD AUTO: 2.78 X10(3)/MCL (ref 2.1–9.2)
NEUTROPHILS NFR BLD AUTO: 54.2 %
NITRITE UR QL STRIP.AUTO: NEGATIVE
NRBC BLD AUTO-RTO: 0 %
PH UR STRIP.AUTO: 5.5 [PH]
PLATELET # BLD AUTO: 265 X10(3)/MCL (ref 130–400)
PMV BLD AUTO: 10.2 FL (ref 7.4–10.4)
POTASSIUM SERPL-SCNC: 4 MMOL/L (ref 3.5–5.1)
PROT SERPL-MCNC: 7.7 GM/DL (ref 5.8–7.6)
PROT UR QL STRIP.AUTO: ABNORMAL
PROT UR STRIP-MCNC: 27.7 MG/DL
RBC # BLD AUTO: 3.86 X10(6)/MCL (ref 4.2–5.4)
RBC #/AREA URNS AUTO: ABNORMAL /HPF
RBC UR QL AUTO: NEGATIVE
SODIUM SERPL-SCNC: 141 MMOL/L (ref 136–145)
SP GR UR STRIP.AUTO: 1.02 (ref 1–1.03)
SQUAMOUS #/AREA URNS LPF: ABNORMAL /HPF
URINE PROTEIN/CREATININE RATIO (OLG): 0.1
UROBILINOGEN UR STRIP-ACNC: ABNORMAL
WBC # SPEC AUTO: 5.12 X10(3)/MCL (ref 4.5–11.5)
WBC #/AREA URNS AUTO: ABNORMAL /HPF

## 2024-02-22 PROCEDURE — 82570 ASSAY OF URINE CREATININE: CPT

## 2024-02-22 PROCEDURE — 1160F RVW MEDS BY RX/DR IN RCRD: CPT | Mod: CPTII,,,

## 2024-02-22 PROCEDURE — 85025 COMPLETE CBC W/AUTO DIFF WBC: CPT

## 2024-02-22 PROCEDURE — 1126F AMNT PAIN NOTED NONE PRSNT: CPT | Mod: CPTII,,,

## 2024-02-22 PROCEDURE — 99215 OFFICE O/P EST HI 40 MIN: CPT | Mod: PBBFAC

## 2024-02-22 PROCEDURE — 3078F DIAST BP <80 MM HG: CPT | Mod: CPTII,,,

## 2024-02-22 PROCEDURE — 81001 URINALYSIS AUTO W/SCOPE: CPT

## 2024-02-22 PROCEDURE — 3288F FALL RISK ASSESSMENT DOCD: CPT | Mod: CPTII,,,

## 2024-02-22 PROCEDURE — 1101F PT FALLS ASSESS-DOCD LE1/YR: CPT | Mod: CPTII,,,

## 2024-02-22 PROCEDURE — 1159F MED LIST DOCD IN RCRD: CPT | Mod: CPTII,,,

## 2024-02-22 PROCEDURE — 3074F SYST BP LT 130 MM HG: CPT | Mod: CPTII,,,

## 2024-02-22 PROCEDURE — 80053 COMPREHEN METABOLIC PANEL: CPT

## 2024-02-22 PROCEDURE — 99214 OFFICE O/P EST MOD 30 MIN: CPT | Mod: S$PBB,,,

## 2024-02-22 PROCEDURE — 36415 COLL VENOUS BLD VENIPUNCTURE: CPT

## 2024-02-22 RX ORDER — POTASSIUM CHLORIDE 1500 MG/1
20 TABLET, EXTENDED RELEASE ORAL DAILY
COMMUNITY
Start: 2024-01-29 | End: 2024-02-27

## 2024-02-22 RX ORDER — FUROSEMIDE 40 MG/1
40 TABLET ORAL DAILY
COMMUNITY
Start: 2024-01-28 | End: 2024-02-27 | Stop reason: SDUPTHER

## 2024-02-22 NOTE — PATIENT INSTRUCTIONS
Complete lab work ordered per primary care   Follow up in cardiology clinic in 6 months or sooner if needed   Follow up with PCP as directed  Please call clinic for any questions or concerns

## 2024-02-27 ENCOUNTER — OFFICE VISIT (OUTPATIENT)
Dept: NEPHROLOGY | Facility: CLINIC | Age: 74
End: 2024-02-27
Payer: MEDICARE

## 2024-02-27 VITALS
HEIGHT: 66 IN | RESPIRATION RATE: 18 BRPM | DIASTOLIC BLOOD PRESSURE: 69 MMHG | WEIGHT: 180.38 LBS | OXYGEN SATURATION: 100 % | BODY MASS INDEX: 28.99 KG/M2 | TEMPERATURE: 98 F | SYSTOLIC BLOOD PRESSURE: 129 MMHG | HEART RATE: 59 BPM

## 2024-02-27 DIAGNOSIS — I50.22 CHRONIC SYSTOLIC HEART FAILURE: ICD-10-CM

## 2024-02-27 DIAGNOSIS — N18.31 CKD STAGE G3A/A1, GFR 45-59 AND ALBUMIN CREATININE RATIO <30 MG/G: Primary | ICD-10-CM

## 2024-02-27 DIAGNOSIS — I10 PRIMARY HYPERTENSION: ICD-10-CM

## 2024-02-27 DIAGNOSIS — N18.31 TYPE 2 DIABETES MELLITUS WITH STAGE 3A CHRONIC KIDNEY DISEASE, WITHOUT LONG-TERM CURRENT USE OF INSULIN: ICD-10-CM

## 2024-02-27 DIAGNOSIS — E11.22 TYPE 2 DIABETES MELLITUS WITH STAGE 3A CHRONIC KIDNEY DISEASE, WITHOUT LONG-TERM CURRENT USE OF INSULIN: ICD-10-CM

## 2024-02-27 PROCEDURE — 3288F FALL RISK ASSESSMENT DOCD: CPT | Mod: CPTII,,, | Performed by: NURSE PRACTITIONER

## 2024-02-27 PROCEDURE — 1101F PT FALLS ASSESS-DOCD LE1/YR: CPT | Mod: CPTII,,, | Performed by: NURSE PRACTITIONER

## 2024-02-27 PROCEDURE — 3074F SYST BP LT 130 MM HG: CPT | Mod: CPTII,,, | Performed by: NURSE PRACTITIONER

## 2024-02-27 PROCEDURE — 3078F DIAST BP <80 MM HG: CPT | Mod: CPTII,,, | Performed by: NURSE PRACTITIONER

## 2024-02-27 PROCEDURE — 99214 OFFICE O/P EST MOD 30 MIN: CPT | Mod: S$PBB,,, | Performed by: NURSE PRACTITIONER

## 2024-02-27 PROCEDURE — 99215 OFFICE O/P EST HI 40 MIN: CPT | Mod: PBBFAC | Performed by: NURSE PRACTITIONER

## 2024-02-27 PROCEDURE — 1159F MED LIST DOCD IN RCRD: CPT | Mod: CPTII,,, | Performed by: NURSE PRACTITIONER

## 2024-02-27 PROCEDURE — 1126F AMNT PAIN NOTED NONE PRSNT: CPT | Mod: CPTII,,, | Performed by: NURSE PRACTITIONER

## 2024-02-27 RX ORDER — FUROSEMIDE 40 MG/1
40 TABLET ORAL
Qty: 30 TABLET | Refills: 0 | OUTPATIENT
Start: 2024-02-27

## 2024-02-27 NOTE — PROGRESS NOTES
"[facility]  Nephrology Clinic Note    Chief complaint: Chronic Kidney Disease (Follow up, nausea)    History of present illness:   Neris Contreras is a 73 y.o. Black or  female with past medical history of hypertension, chronic kidney, chronic constipation, dysphagia, dyslipidemia, peripheral vascular disease, diabetes mellitus type 2, CARISSA, pulmonary hypertension, history of CVA, heart failure with preserved ejection fraction (followed by [facility] cardi[facility]) and obesity. Presents for follow up appointment in nephrology clinic, denies complaints.     Review of Systems  12 point review of systems conducted, negative except as stated in the history of present illness.    Allergies: Patient is allergic to iodine, iodine and iodide containing products, and shellfish containing products.     Past Medical History:  has a past medical history of CVA (cerebral vascular accident), Depression, Diabetes mellitus, HLD (hyperlipidemia), Hypertension, Pulmonary HTN, and Sleep apnea.    Procedure History:  has a past surgical history that includes Leg Surgery (Right); Eye surgery; Cardiac surgery; Cardiac catheterization; Open reduction and internal fixation (ORIF) of injury of elbow; and Repair of eyelid (Left, 5/13/2022).    Family History: family history includes Diabetes in her mother; Kidney disease in her sister and son.    Social History:  reports that she has never smoked. She has never used smokeless tobacco. She reports that she does not currently use alcohol. She reports that she does not use drugs.    Physical exam  /69 (BP Location: Left arm, Patient Position: Sitting, BP Method: Large (Automatic))   Pulse (!) 59   Temp 97.5 °F (36.4 °C) (Oral)   Resp 18   Ht 5' 6" (1.676 m)   Wt 81.8 kg (180 lb 6.4 oz)   SpO2 100%   BMI 29.12 kg/m²   General appearance: Patient is in no acute distress.  Skin: No rashes or wounds.  HEENT: PERRLA, EOMI, no scleral icterus, no " JVD. Neck is supple.  Chest: Respirations are unlabored. Lungs sounds are clear.   Heart: S1, S2.   Abdomen: Benign.  : Deferred.  Extremities: No edema, peripheral pulses are palpable.   Neuro: No focal deficits.     Home Medications:    Current Outpatient Medications:     albuterol (PROVENTIL/VENTOLIN HFA) 90 mcg/actuation inhaler, Inhale 2 puffs into the lungs every 4 (four) hours., Disp: , Rfl:     amLODIPine (NORVASC) 10 MG tablet, Take 1 tablet (10 mg total) by mouth once daily., Disp: 90 tablet, Rfl: 1    atorvastatin (LIPITOR) 20 MG tablet, Take 1 tablet (20 mg total) by mouth every evening., Disp: 90 tablet, Rfl: 1    benazepriL (LOTENSIN) 40 MG tablet, Take 1 tablet (40 mg total) by mouth once daily., Disp: 90 tablet, Rfl: 1    clopidogreL (PLAVIX) 75 mg tablet, Take 1 tablet (75 mg total) by mouth once daily., Disp: 30 tablet, Rfl: 11    ergocalciferol (ERGOCALCIFEROL) 50,000 unit Cap, Take 1 capsule (50,000 Units total) by mouth every 7 days., Disp: 12 capsule, Rfl: 0    fluticasone propionate (FLONASE) 50 mcg/actuation nasal spray, 1 spray (50 mcg total) by Each Nostril route daily as needed for Rhinitis., Disp: 16 g, Rfl: 0    furosemide (LASIX) 40 MG tablet, Take 40 mg by mouth once daily., Disp: , Rfl:     hydrALAZINE (APRESOLINE) 100 MG tablet, TAKE 1 TABLET BY MOUTH EVERY 12 HOURS, Disp: 60 tablet, Rfl: 0    linaCLOtide (LINZESS) 145 mcg Cap capsule, Take 1 capsule (145 mcg total) by mouth before breakfast., Disp: 90 capsule, Rfl: 1    pantoprazole (PROTONIX) 40 MG tablet, Take 1 tablet (40 mg total) by mouth once daily., Disp: 30 tablet, Rfl: 3    potassium chloride (K-TAB) 20 mEq, Take 20 mEq by mouth once daily., Disp: , Rfl:     spironolactone (ALDACTONE) 25 MG tablet, Take 1 tablet (25 mg total) by mouth once daily., Disp: 30 tablet, Rfl: 11    TYRVAYA 0.03 mg/spray sprm, by Each Nostril route., Disp: , Rfl:     gabapentin (NEURONTIN) 100 MG capsule, Take 1 capsule (100 mg total) by mouth 3  (three) times daily., Disp: 90 capsule, Rfl: 2    Laboratory data    Lab Results   Component Value Date    WBC 5.12 02/22/2024    HGB 10.9 (L) 02/22/2024    HCT 34.6 (L) 02/22/2024     02/22/2024    FOLATE 9.4 10/19/2020    [id number] 733 12/09/2020     (H) 08/03/2020     02/22/2024    K 4.0 02/22/2024    CHLORIDE 105 02/22/2024    CO2 26 02/22/2024    BUN 13.9 02/22/2024    CREATININE 1.37 (H) 02/22/2024    EGFRNORACEVR 40 02/22/2024    GLUCOSE 111 02/22/2024    CALCIUM 10.1 02/22/2024    ALKPHOS 68 02/22/2024    LABPROT 7.7 (H) 02/22/2024    ALBUMIN 4.2 02/22/2024    BILIDIR 0.3 04/28/2022    IBILI 0.40 04/28/2022    AST 15 02/22/2024    ALT 11 02/22/2024    MG 1.80 03/06/2023      Lab Results   Component Value Date    HGBA1C 6.7 11/27/2023    .5 (H) 04/11/2023    [id number] 24.9 (L) 11/27/2023    HEPCAB Nonreactive 04/11/2023     Urine:  Lab Results   Component Value Date    COLORUA Yellow 02/22/2024    APPEARANCEUA Clear 02/22/2024    SGUA 1.021 02/22/2024    PHUA 5.5 02/22/2024    PROTEINUA Trace (A) 02/22/2024    GLUCOSEUA Normal 02/22/2024    KETONESUA Negative 02/22/2024    BLOODUA Negative 02/22/2024    NITRITESUA Negative 02/22/2024    LEUKOCYTESUR Negative 02/22/2024    RBCUA 0-5 02/22/2024    WBCUA 0-5 02/22/2024    BACTERIA None Seen 02/22/2024    SQEPUA Trace (A) 02/22/2024    HYALINECASTS >20 (A) 02/22/2024    CREATRANDUR 369.6 (H) 02/22/2024    PROTEINURINE 27.7 02/22/2024    UPROTCREA 0.1 02/22/2024         Imaging  US Retroperitoneal Limited 11/28/2023  The kidneys are symmetric and normal in size and position with the right kidney measuring 8.8 cm in length and the left kidney 10.1 cm in length.  There is no abnormal renal cortical echogenicity or cortical thinning.  A single simple right interpolar renal cyst measures 3.2 x 3 x 2.7 cm.  A single simple left renal cyst measures 4 x 3.9 x 3.7 cm.  No nephrolithiasis is identified.  There is no hydronephrosis.  Normal  color Doppler vascular flow is demonstrated at both kidneys.  There is no perirenal free fluid or mass.  The proximal IVC is widely patent and normal.  The bladder was not imaged.  Impression  Single simple bilateral renal cysts with no additional renal pathology identified.  Electronically signed by: Nazario Lyons  Date:    11/28/2023  Time:    08:38      Impression    ICD-10-CM ICD-9-CM   1. CKD stage G3a/A1, GFR 45-59 and albumin creatinine ratio <30 mg/g  N18.31 585.3   2. Primary hypertension  I10 401.9   3. Chronic systolic heart failure  I50.22 428.22   4. Type 2 diabetes mellitus with stage 3a chronic kidney disease, without long-term current use of insulin  E11.22 250.40    N18.31 585.3   5. BMI 29.0-29.9,adult  Z68.29 V85.25        Plan     CKD stage G3a/A1, GFR 45-59 and albumin creatinine ratio <30 mg/g  -     Comprehensive Metabolic Panel; Future; Expected date: 08/17/2024  -     Protein/Creatinine Ratio, Urine; Future; Expected date: 08/17/2024  -     CBC Auto Differential; Future; Expected date: 08/17/2024  -     Phosphorus; Future; Expected date: 08/17/2024  -     PTH, Intact; Future; Expected date: 08/17/2024  -     Urinalysis, Reflex to Urine Culture; Future; Expected date: 08/17/2024  Likely diabetic kidney disease.  Patient's genetic test was negative, which means there was no identifiable hereditary pathogenic variant.  We discussed that it is possible there is a pathogenic variant that was not identified through this test. Therefore, I recommend contacting genetic testing laboratory periodically to learn about any updates to the genetic test that might be relevant.  Continue risk factor management, ACE inhibitor, MRA with close monitoring for development of hyperkalemia.  Consider adding SGLT2 inhibitor to medication regimen.    Follow up in about 6 months (around 8/27/2024).     Primary hypertension  Blood pressure reading is at goal, continue current antihypertensive regimen and 2 g a day  dietary sodium restriction.      Chronic systolic heart failure  Patient is euvolemic on exam.  Continue ACE inhibitor, MRA, loop diuretic as needed.  Consider adding SGLT2 inhibitor.     Type 2 diabetes mellitus with stage 3a chronic kidney disease, without long-term current use of insulin  Continue aggressive risk factor management, ACE inhibitor and MRA.  Consider adding SGLT2 inhibitor.     BMI 29.0-29.9,adult  Lifestyle and dietary interventions discussed, patient encouraged to maintain non-sedentary lifestyle and well-balanced diet.           2/27/2024  Laura Davis NP  [facility]

## 2024-02-27 NOTE — TELEPHONE ENCOUNTER
Please let the patient know that I reviewed her office visit with Nephrology and I agree with the recommendation to start taking Jardiance, which is a medication that protects her kidneys and also it is an antidiabetic medication.  Last hemoglobin A1c while I was on maternity leave was abnormal, putting her on diabetic range.  Please let me know if patient is agreeable to start this medication and will follow up as scheduled with repeat labs as recommended before next visit.  Thanks

## 2024-02-28 DIAGNOSIS — N18.31 STAGE 3A CHRONIC KIDNEY DISEASE: ICD-10-CM

## 2024-02-28 DIAGNOSIS — E11.9 TYPE 2 DIABETES MELLITUS WITHOUT COMPLICATION, WITHOUT LONG-TERM CURRENT USE OF INSULIN: Primary | ICD-10-CM

## 2024-02-28 RX ORDER — FUROSEMIDE 40 MG/1
40 TABLET ORAL DAILY PRN
Qty: 90 TABLET | Refills: 0 | Status: SHIPPED | OUTPATIENT
Start: 2024-02-28 | End: 2024-05-23

## 2024-04-25 DIAGNOSIS — K59.09 CHRONIC CONSTIPATION: ICD-10-CM

## 2024-04-25 DIAGNOSIS — I1A.0 RESISTANT HYPERTENSION: ICD-10-CM

## 2024-04-26 RX ORDER — POTASSIUM CHLORIDE 1500 MG/1
20 TABLET, EXTENDED RELEASE ORAL
Qty: 90 TABLET | Refills: 0 | OUTPATIENT
Start: 2024-04-26

## 2024-04-26 RX ORDER — AMLODIPINE BESYLATE 10 MG/1
10 TABLET ORAL
Qty: 90 TABLET | Refills: 0 | Status: SHIPPED | OUTPATIENT
Start: 2024-04-26

## 2024-04-26 RX ORDER — LINACLOTIDE 145 UG/1
145 CAPSULE, GELATIN COATED ORAL
Qty: 90 CAPSULE | Refills: 0 | Status: SHIPPED | OUTPATIENT
Start: 2024-04-26

## 2024-05-23 DIAGNOSIS — N18.31 STAGE 3A CHRONIC KIDNEY DISEASE: ICD-10-CM

## 2024-05-23 DIAGNOSIS — E11.9 TYPE 2 DIABETES MELLITUS WITHOUT COMPLICATION, WITHOUT LONG-TERM CURRENT USE OF INSULIN: ICD-10-CM

## 2024-05-23 RX ORDER — FUROSEMIDE 40 MG/1
TABLET ORAL
Qty: 90 TABLET | Refills: 0 | Status: SHIPPED | OUTPATIENT
Start: 2024-05-23

## 2024-05-23 RX ORDER — EMPAGLIFLOZIN 10 MG/1
10 TABLET, FILM COATED ORAL
Qty: 90 TABLET | Refills: 0 | Status: SHIPPED | OUTPATIENT
Start: 2024-05-23

## 2024-07-01 DIAGNOSIS — R13.10 DYSPHAGIA, UNSPECIFIED TYPE: Primary | ICD-10-CM

## 2024-07-12 ENCOUNTER — HOSPITAL ENCOUNTER (EMERGENCY)
Facility: HOSPITAL | Age: 74
Discharge: HOME | End: 2024-07-12
Attending: EMERGENCY MEDICINE
Payer: MEDICARE

## 2024-07-12 VITALS
TEMPERATURE: 98 F | HEART RATE: 84 BPM | RESPIRATION RATE: 23 BRPM | OXYGEN SATURATION: 92 % | HEIGHT: 66 IN | WEIGHT: 160 LBS | BODY MASS INDEX: 25.71 KG/M2 | SYSTOLIC BLOOD PRESSURE: 160 MMHG | DIASTOLIC BLOOD PRESSURE: 78 MMHG

## 2024-07-12 DIAGNOSIS — S01.81XA FACIAL LACERATION, INITIAL ENCOUNTER: ICD-10-CM
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
## 2024-07-12 PROCEDURE — 99283 EMERGENCY DEPT VISIT LOW MDM: CPT | Mod: GC,,, | Performed by: SURGERY

## 2024-07-12 PROCEDURE — G0390 TRAUMA RESPONS W/HOSP CRITI: HCPCS

## 2024-07-12 PROCEDURE — 25000003 PHARM REV CODE 250: Performed by: EMERGENCY MEDICINE

## 2024-07-12 PROCEDURE — 90471 IMMUNIZATION ADMIN: CPT | Performed by: EMERGENCY MEDICINE

## 2024-07-12 PROCEDURE — 12052 INTMD RPR FACE/MM 2.6-5.0 CM: CPT

## 2024-07-12 PROCEDURE — 63600175 PHARM REV CODE 636 W HCPCS: Performed by: EMERGENCY MEDICINE

## 2024-07-12 PROCEDURE — 99284 EMERGENCY DEPT VISIT MOD MDM: CPT | Mod: 25

## 2024-07-12 PROCEDURE — 90715 TDAP VACCINE 7 YRS/> IM: CPT | Performed by: EMERGENCY MEDICINE

## 2024-07-12 RX ORDER — ONDANSETRON 4 MG/1
4 TABLET, ORALLY DISINTEGRATING ORAL
Status: COMPLETED | OUTPATIENT
Start: 2024-07-12 | End: 2024-07-12

## 2024-07-12 RX ORDER — HYDROCODONE BITARTRATE AND ACETAMINOPHEN 10; 325 MG/1; MG/1
1 TABLET ORAL
Status: COMPLETED | OUTPATIENT
Start: 2024-07-12 | End: 2024-07-12

## 2024-07-12 RX ADMIN — TETANUS TOXOID, REDUCED DIPHTHERIA TOXOID AND ACELLULAR PERTUSSIS VACCINE, ADSORBED 0.5 ML: 5; 2.5; 8; 8; 2.5 SUSPENSION INTRAMUSCULAR at 03:07

## 2024-07-12 RX ADMIN — HYDROCODONE BITARTRATE AND ACETAMINOPHEN 1 TABLET: 10; 325 TABLET ORAL at 04:07

## 2024-07-12 RX ADMIN — ONDANSETRON 4 MG: 4 TABLET, ORALLY DISINTEGRATING ORAL at 04:07

## 2024-07-12 RX ADMIN — Medication 1 ML: at 04:07
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
## 2024-07-12 NOTE — ED NOTES
Adv client of discharge instruction and reinforced education provided by Dr. Paul. Client adv sister Emili will be providing transport
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
## 2024-07-12 NOTE — ED NOTES
Patient assisted to sitting by Dr. Paul. No tenderness, step offs, or deformities noted. No neuro changes changes.

## 2024-07-12 NOTE — DISCHARGE INSTRUCTIONS
Remove sutures in 7 days, keep wound clean and dry and covered with antibiotic ointment and a clean dressing.    Thanks for letting us take care of you today!  It is our goal to give you courteous care and to keep you comfortable and informed, if you have any questions before you leave I will be happy to try and answer them.    Here is some advice after your visit:      Your visit in the emergency department is NOT definitive care - please follow-up with your primary care doctor and/or specialist within 1-2 days.  Please return if you have any worsening in your condition or if you have any other concerns.    If you had radiology exams like an XRAY or CT in the emergency Department the interpreation on them may be preliminary - there may be less time sensitive findings on the reports please obtain these reports within 24 hours from the hospital or by using your out on your mobile phone to access records.  Bring these to your primary care doctor and/or specialist for further review of incidental findings.    Please review any LAB WORK from your visit today with your primary care physician.    If you were prescribed OPIATE PAIN MEDICATION - please understand of these medications can be addictive, you may fill less of the prescription was written for, you do not have to take the full prescription.  You may discard what you do not use.  Please seek help if you feel you are having problems with addiction.  Do not drive or operate heavy machinery if you are taking sedating medications.  Do not mix these medications with alcohol.      If you had a SPLINT placed in the emergency department if you have severe pain numbness tingling or discoloration of year digits please remove the splint and return to the emergency department for further evaluation as this may represent a sign of compromise to the nerves or blood vessels due to swelling.    If you had SUTURES in the emergency department please have them removed in the  prescribed time frame typically within 7-14 days.  You may shower but please do not bathe or swim.  Keep the wounds clean and dry and covered with a clean dressing.  Please return if he have any signs of infection like redness or drainage or pain at the suture site.    Please take the full course of  any ANTIBIOTICS you were prescribed - incomplete courses of antibiotics can cause resistance to antibiotics in the future which will make it difficult to treat any infections you may have.

## 2024-07-21 DIAGNOSIS — K59.09 CHRONIC CONSTIPATION: ICD-10-CM

## 2024-07-21 DIAGNOSIS — I1A.0 RESISTANT HYPERTENSION: ICD-10-CM

## 2024-07-22 RX ORDER — LINACLOTIDE 145 UG/1
145 CAPSULE, GELATIN COATED ORAL
Qty: 90 CAPSULE | Refills: 0 | Status: SHIPPED | OUTPATIENT
Start: 2024-07-22

## 2024-07-22 RX ORDER — AMLODIPINE BESYLATE 10 MG/1
10 TABLET ORAL
Qty: 90 TABLET | Refills: 0 | Status: SHIPPED | OUTPATIENT
Start: 2024-07-22

## 2024-08-13 DIAGNOSIS — J30.2 SEASONAL ALLERGIES: ICD-10-CM

## 2024-08-14 DIAGNOSIS — J30.2 SEASONAL ALLERGIES: ICD-10-CM

## 2024-08-14 RX ORDER — FLUTICASONE PROPIONATE 50 MCG
SPRAY, SUSPENSION (ML) NASAL
Qty: 16 G | Refills: 0 | Status: SHIPPED | OUTPATIENT
Start: 2024-08-14

## 2024-08-15 RX ORDER — FLUTICASONE PROPIONATE 50 MCG
SPRAY, SUSPENSION (ML) NASAL
Qty: 16 G | Refills: 0 | OUTPATIENT
Start: 2024-08-15

## 2024-08-15 NOTE — PROGRESS NOTES
"Patient Name: Neris Contreras   : 1950  MRN: [id number]     SUBJECTIVE:  Neris Contreras is a 73 y.o. female here for Follow-up (The patient states that she has swallowing issues that make her choke. Also, upper/mid back pain. She would like a referral to podiatry and GYN.)  .    HPI  Here for close follow-up of hypertension and above issues  taking benzepril 40, amlodipine 10 mg, hydralazine 50 bid, lasix 40 mg qd  Here blood pressure elevated on multiple rechecks.  Asymptomatic though with no headache, no blurry vision, no chest pain shortness for breath, no focal weakness or numbness.  At home: not really checking but when checking mostly 140's. Not this high as today. Having a cough though and thinks it's contributing to it. Started as sore thoat last week, then dry cough. Sinus pain, postnasal drip. Sinus pain not getting better. Has been using flonase but not daily, will start to do so.    Last visit, started her on paxil for hot flashes but has not helped at all. So she stopped 2-3 weeks ago. Would like to see a gyn for this and for just regular well woman exam    Linzess for constipation. Helps.    Problem with swallowing for a year but has forgotten to mention. Feels like food gets stuck in her throat. Both liquid and solid kind of choking on them. Has had EGD done in the past about 20 years ago. No heartburn issues. No melena or hematochezia.  No unintentional weight loss    Mid upper back intermittent pain for a long time. Feels like a dull pain. Sometimes tylenol and does help but just for a while. Positional movement making it worse.     Patient is wondering if she needs to see Podiatry for neuropathy. Under toes tingling sensation, worse with walking.  Chart reviewing, she did have Doppler ultrasound which showed: "Abnormal monophasic waveforms in the bilateral dorsalis pedis arteries, and left anterior tibial and posterior tibial arteries."       ALLERGIES:   Review of patient's allergies indicates: " Pt off unit to IR via bed, family with patient. Plan of care ongoing   "  Allergen Reactions    Iodine Hives     topical    Iodine and iodide containing products     Shellfish containing products      Other reaction(s): whelps/ itching         ROS:  Review of Systems   Constitutional:  Negative for chills, fever and weight loss.   HENT:  Positive for congestion and sinus pain. Negative for sore throat (resolved).         Sinus pain   Eyes:  Negative for blurred vision.   Respiratory:  Positive for cough. Negative for sputum production and shortness of breath.    Cardiovascular:  Negative for chest pain, palpitations and leg swelling.   Gastrointestinal:  Negative for abdominal pain, blood in stool, constipation, heartburn (but has problems swallowing), nausea and vomiting.   Genitourinary:  Negative for dysuria and hematuria.   Musculoskeletal:  Positive for back pain.   Neurological:  Negative for dizziness and headaches.   Psychiatric/Behavioral:  Negative for depression. The patient is not nervous/anxious.          OBJECTIVE:  Vital signs  Vitals:    11/03/23 0922 11/03/23 0951   BP: (!) 180/90 (!) 160/92   Pulse: 62    Resp: 18    Temp: 97.5 °F (36.4 °C)    TempSrc: Oral    SpO2: 99%    Weight: 84.4 kg (186 lb)    Height: 5' 6" (1.676 m)       Body mass index is 30.02 kg/m².    PHYSICAL EXAM:   Physical Exam  Vitals reviewed.   Constitutional:       General: She is not in acute distress.     Appearance: Normal appearance. She is not ill-appearing.   HENT:      Head: Normocephalic and atraumatic.      Comments: Maxillary sinus tenderness     Right Ear: Tympanic membrane and external ear normal.      Left Ear: Tympanic membrane and external ear normal.      Nose: Congestion and rhinorrhea present.      Mouth/Throat:      Mouth: Mucous membranes are moist.      Pharynx: No oropharyngeal exudate.   Eyes:      General: No scleral icterus.        Right eye: No discharge.         Left eye: No discharge.      Conjunctiva/sclera: Conjunctivae normal.      Pupils: Pupils are equal, round, and " reactive to light.   Cardiovascular:      Rate and Rhythm: Normal rate and regular rhythm.   Pulmonary:      Effort: Pulmonary effort is normal. No respiratory distress.      Breath sounds: No wheezing, rhonchi or rales.   Abdominal:      General: Bowel sounds are normal. There is no distension.      Palpations: Abdomen is soft.      Tenderness: There is no abdominal tenderness.   Musculoskeletal:         General: Tenderness (mid thoracic mild prevertebral muscle tenderness) present.      Cervical back: Normal range of motion and neck supple. No rigidity or tenderness.      Right lower leg: No edema.      Left lower leg: No edema.   Skin:     General: Skin is warm.      Findings: No rash.   Neurological:      General: No focal deficit present.      Mental Status: She is alert and oriented to person, place, and time.   Psychiatric:         Mood and Affect: Mood normal.         Behavior: Behavior normal.          ASSESSMENT/PLAN:  1. Resistant hypertension  Uncontrolled.  Will increase hydralazine to 100 mg b.i.d. with close follow-up.  We will also refer to nephrology given underlying stage IIIA chronic kidney disease and resistant hypertension.  Continue with amlodipine, furosemide, benazepril.  Check labs.  Continue with potassium supplement being on Lasix.  -     amLODIPine (NORVASC) 10 MG tablet; Take 1 tablet (10 mg total) by mouth once daily.  Dispense: 90 tablet; Refill: 1  -     benazepriL (LOTENSIN) 40 MG tablet; See Instructions, TAKE 1 TABLET BY MOUTH ONCE DAILY, # 90 tab(s), 3 Refill(s), [facility], 168, cm, Height/Length Dosing, 07/15/21 10:50:00 CDT, 75.6, kg, Weight Dosing, 07/15/21 10:50:00 CDT  Dispense: 30 tablet; Refill: 0  -     furosemide (LASIX) 40 MG tablet; Take 1 tablet (40 mg total) by mouth once daily.  Dispense: 30 tablet; Refill: 3  -     potassium chloride (K-TAB) 20 mEq; Take 1 tablet (20 mEq total) by mouth once daily.  Dispense: 30 tablet; Refill: 5  -      hydrALAZINE (APRESOLINE) 100 MG tablet; Take 1 tablet (100 mg total) by mouth every 12 (twelve) hours.  Dispense: 60 tablet; Refill: 2  -     Ambulatory referral/consult to Nephrology; Future; Expected date: 11/10/2023  -     Basic Metabolic Panel; Future; Expected date: 11/03/2023  -     Hemoglobin A1C; Future; Expected date: 11/03/2023    2. Stage 3a chronic kidney disease  As above  -     Ambulatory referral/consult to Nephrology; Future; Expected date: 11/10/2023  -     Basic Metabolic Panel; Future; Expected date: 11/03/2023    3. Dysphagia, unspecified type  Start Protonix even though she does not have heartburn, to see if it will help with the dysphagia until she sees GI.  Will have a chest x-ray done because of the cough and the risk of aspiration, reporting choking on food and having this recent cough.  -     pantoprazole (PROTONIX) 40 MG tablet; Take 1 tablet (40 mg total) by mouth once daily.  Dispense: 30 tablet; Refill: 3  -     Ambulatory referral/consult to Gastroenterology; Future; Expected date: 11/10/2023  -     X-Ray Chest PA And Lateral; Future; Expected date: 11/03/2023    4. Chronic constipation  Well-controlled on Linzess.  Continue with it and will establish care with GI  -     linaCLOtide (LINZESS) 145 mcg Cap capsule; Take 1 capsule (145 mcg total) by mouth before breakfast.  Dispense: 90 capsule; Refill: 1  -     Ambulatory referral/consult to Gastroenterology; Future; Expected date: 11/10/2023    5. Hot flashes due to menopause  Uncontrolled.  Paxil in a work.  Discontinue it.  Start Lexapro 10 mg daily and refer to gynecology for further management and for regular well-woman exams that patient is requesting.  -     EScitalopram oxalate (LEXAPRO) 10 MG tablet; Take 1 tablet (10 mg total) by mouth once daily.  Dispense: 30 tablet; Refill: 2  -     Ambulatory referral/consult to Gynecology; Future; Expected date: 11/10/2023    6. Well woman exam  -     Ambulatory referral/consult to  Gynecology; Future; Expected date: 11/10/2023    7. Acute maxillary sinusitis, recurrence not specified  Has been a week with no improvement.  Start Augmentin.  Advised to continue with Flonase every day.  -     fluticasone propionate (FLONASE) 50 mcg/actuation nasal spray; 1 spray (50 mcg total) by Each Nostril route daily as needed for Rhinitis.  Dispense: 16 g; Refill: 0  -     amoxicillin-clavulanate 875-125mg (AUGMENTIN) 875-125 mg per tablet; Take 1 tablet by mouth 2 (two) times daily.  Dispense: 20 tablet; Refill: 0  -     X-Ray Chest PA And Lateral; Future; Expected date: 11/03/2023    8. Chronic midline thoracic back pain  No red flags and Tylenol does help a bit.  Having CKD, limited in options, advised to not use any NSAIDs.  Trial of Robaxin.  Will refer to physical therapy as well having this chronic intermittent pain.  Check x-ray for basic eval.  -     X-Ray Thoracic Spine AP Lateral; Future; Expected date: 11/03/2023  -     Ambulatory referral/consult to Physical/Occupational Therapy; Future; Expected date: 11/10/2023  -     methocarbamoL (ROBAXIN) 500 MG Tab; Take 1 tablet (500 mg total) by mouth 4 (four) times daily. for 10 days  Dispense: 40 tablet; Refill: 0    9. Hyperlipidemia, unspecified hyperlipidemia type  Well-controlled.  Continue with Lipitor 20 mg daily.  -     atorvastatin (LIPITOR) 20 MG tablet; Take 1 tablet (20 mg total) by mouth every evening.  Dispense: 90 tablet; Refill: 1    10. PAD (peripheral artery disease)  Patient does have neuropathy and agreeable to try gabapentin 100 mg 3 times a day.  We will refer to Vascular surgery for abnormal Doppler ultrasound of the arteries of the feet.  -     gabapentin (NEURONTIN) 100 MG capsule; Take 1 capsule (100 mg total) by mouth 3 (three) times daily.  Dispense: 90 capsule; Refill: 2  -     Ambulatory referral/consult to Vascular Surgery; Future; Expected date: 11/10/2023    11. History of diabetes mellitus  -     Hemoglobin A1C; Future;  Expected date: 11/03/2023    12. Vitamin D deficiency  -     ergocalciferol (ERGOCALCIFEROL) 50,000 unit Cap; Take 1 capsule (50,000 Units total) by mouth every 7 days.  Dispense: 12 capsule; Refill: 0  -     Vitamin D; Future; Expected date: 11/03/2023         I spent a total of 48 minutes on the day of the visit.This includes face to face time and non-face to face time preparing to see the patient (eg, review of tests), obtaining and/or reviewing separately obtained history, documenting clinical information in the electronic or other health record, independently interpreting results and communicating results to the patient/family/caregiver, or care coordinator.          Previous medical history/lab work/radiology reviewed and considered during medical management decisions.   Medication list reviewed and medication reconciliation performed.  Patient was provided  and care about his/her current diagnosis (es) and medications including risk/benefit and side effects/adverse events, over the counter medication uses/doses, home self-care and contact precautions,  and red flags and indications for when to seek immediate medical attention.   Patient was advised to continue compliance with current medication list and medical recommendations.  Recommended/ Advised continued compliance with recommended eating habits/ diets for medical conditions and exercise 150 minutes/ week (if possible) for medical condition (s).        RESULTS:  Recent Results (from the past 1008 hour(s))   Echo    Collection Time: 09/27/23  8:57 AM   Result Value Ref Range    BSA 1.95 m2    Logan's Biplane MOD Ejection Fraction 54 %    LVOT stroke volume 86.42 cm3    LVIDd 3.95 3.5 - 6.0 cm    LV Systolic Volume 13.54 mL    LV Systolic Volume Index 7.1 mL/m2    LVIDs 2.05 (A) 2.1 - 4.0 cm    LV Diastolic Volume 68.12 mL    LV Diastolic Volume Index 35.66 mL/m2    IVS 1.16 (A) 0.6 - 1.1 cm    LVOT diameter 1.99 cm    LVOT area 3.1 cm2    FS 48  (A) 28 - 44 %    Left Ventricle Relative Wall Thickness 0.67 cm    Posterior Wall 1.33 (A) 0.6 - 1.1 cm    LV mass 171.54 g    LV Mass Index 90 g/m2    MV Peak E Genaro 0.56 m/s    TDI LATERAL 0.07 m/s    MV Peak A Genaro 0.75 m/s    TR Max Genaro 2.86 m/s    E/A ratio 0.75     E wave deceleration time 236.38 msec    LV LATERAL E/E' RATIO 8.00 m/s    LVOT peak genaro 1.03 m/s    Left Ventricular Outflow Tract Mean Velocity 0.68 cm/s    Left Ventricular Outflow Tract Mean Gradient 2.15 mmHg    LA size 3.49 cm    LA volume (mod) 35.09 cm3    LA Volume Index (Mod) 18.4 mL/m2    TAPSE 2.27 cm    RA Major Axis 4.06 cm    AV mean gradient 4 mmHg    AV peak gradient 10 mmHg    Ao peak genaro 1.55 m/s    Ao VTI 34.60 cm    LVOT peak VTI 27.80 cm    AV valve area 2.50 cm²    AV Velocity Ratio 0.66     AV index (prosthetic) 0.80     ZAINA by Velocity Ratio 2.07 cm²    Mr max genaro 4.75 m/s    MV mean gradient 1 mmHg    MV peak gradient 4 mmHg    MV valve area by continuity eq 2.19 cm2    MV VTI 39.4 cm    Triscuspid Valve Regurgitation Peak Gradient 33 mmHg    ZLVIDS -3.79     ZLVIDD -3.08     Mitral Valve Heart Rate 38 bpm    TV resting pulmonary artery pressure 36 mmHg    RV TB RVSP 6 mmHg    Est. RA pres 3 mmHg    IVC diameter 2.2 cm         Follow Up:  Follow up in about 3 months (around 2/3/2024) for htn, neuropathy.     [unfilled]    This note was created with the assistance of a voice recognition software or phone dictation. There may be transcription errors as a result of using this technology however minimal. Effort has been made to assure accuracy of transcription but any obvious errors or omissions should be clarified with the author of the document

## 2024-08-22 ENCOUNTER — LAB VISIT (OUTPATIENT)
Dept: LAB | Facility: HOSPITAL | Age: 74
End: 2024-08-22
Attending: NURSE PRACTITIONER
Payer: MEDICARE

## 2024-08-22 ENCOUNTER — OFFICE VISIT (OUTPATIENT)
Dept: CARDIOLOGY | Facility: CLINIC | Age: 74
End: 2024-08-22
Payer: MEDICARE

## 2024-08-22 VITALS
HEIGHT: 66 IN | OXYGEN SATURATION: 99 % | TEMPERATURE: 98 F | BODY MASS INDEX: 26.79 KG/M2 | HEART RATE: 58 BPM | DIASTOLIC BLOOD PRESSURE: 67 MMHG | SYSTOLIC BLOOD PRESSURE: 120 MMHG | RESPIRATION RATE: 18 BRPM | WEIGHT: 166.69 LBS

## 2024-08-22 DIAGNOSIS — I1A.0 RESISTANT HYPERTENSION: ICD-10-CM

## 2024-08-22 DIAGNOSIS — E55.9 VITAMIN D DEFICIENCY: ICD-10-CM

## 2024-08-22 DIAGNOSIS — I73.9 PAD (PERIPHERAL ARTERY DISEASE): ICD-10-CM

## 2024-08-22 DIAGNOSIS — N18.31 CKD STAGE G3A/A1, GFR 45-59 AND ALBUMIN CREATININE RATIO <30 MG/G: ICD-10-CM

## 2024-08-22 DIAGNOSIS — E78.5 HYPERLIPIDEMIA, UNSPECIFIED HYPERLIPIDEMIA TYPE: ICD-10-CM

## 2024-08-22 DIAGNOSIS — I73.9 CLAUDICATION OF BOTH LOWER EXTREMITIES: Primary | ICD-10-CM

## 2024-08-22 DIAGNOSIS — I27.20 PULMONARY HYPERTENSION: ICD-10-CM

## 2024-08-22 DIAGNOSIS — R00.1 BRADYCARDIA: ICD-10-CM

## 2024-08-22 DIAGNOSIS — R73.01 ELEVATED FASTING GLUCOSE: ICD-10-CM

## 2024-08-22 DIAGNOSIS — R25.2 BILATERAL LEG CRAMPS: ICD-10-CM

## 2024-08-22 LAB
25(OH)D3+25(OH)D2 SERPL-MCNC: 32 NG/ML (ref 30–80)
ALBUMIN SERPL-MCNC: 4.3 G/DL (ref 3.4–4.8)
ALBUMIN/GLOB SERPL: 1.1 RATIO (ref 1.1–2)
ALP SERPL-CCNC: 76 UNIT/L (ref 40–150)
ALT SERPL-CCNC: 12 UNIT/L (ref 0–55)
ANION GAP SERPL CALC-SCNC: 6 MEQ/L
AST SERPL-CCNC: 16 UNIT/L (ref 5–34)
BACTERIA #/AREA URNS AUTO: ABNORMAL /HPF
BASOPHILS # BLD AUTO: 0.04 X10(3)/MCL
BASOPHILS NFR BLD AUTO: 0.7 %
BILIRUB SERPL-MCNC: 0.8 MG/DL
BILIRUB UR QL STRIP.AUTO: NEGATIVE
BUN SERPL-MCNC: 18.4 MG/DL (ref 9.8–20.1)
CALCIUM SERPL-MCNC: 10.8 MG/DL (ref 8.4–10.2)
CHLORIDE SERPL-SCNC: 109 MMOL/L (ref 98–107)
CHOLEST SERPL-MCNC: 134 MG/DL
CHOLEST/HDLC SERPL: 3 {RATIO} (ref 0–5)
CLARITY UR: CLEAR
CO2 SERPL-SCNC: 25 MMOL/L (ref 23–31)
COLOR UR AUTO: YELLOW
CREAT SERPL-MCNC: 1.64 MG/DL (ref 0.55–1.02)
CREAT UR-MCNC: 236.9 MG/DL (ref 45–106)
CREAT/UREA NIT SERPL: 11
EOSINOPHIL # BLD AUTO: 0.24 X10(3)/MCL (ref 0–0.9)
EOSINOPHIL NFR BLD AUTO: 4 %
ERYTHROCYTE [DISTWIDTH] IN BLOOD BY AUTOMATED COUNT: 13.8 % (ref 11.5–17)
EST. AVERAGE GLUCOSE BLD GHB EST-MCNC: 102.5 MG/DL
GFR SERPLBLD CREATININE-BSD FMLA CKD-EPI: 32 ML/MIN/1.73/M2
GLOBULIN SER-MCNC: 3.8 GM/DL (ref 2.4–3.5)
GLUCOSE SERPL-MCNC: 103 MG/DL (ref 82–115)
GLUCOSE UR QL STRIP: ABNORMAL
HBA1C MFR BLD: 5.2 %
HCT VFR BLD AUTO: 38 % (ref 37–47)
HDLC SERPL-MCNC: 44 MG/DL (ref 35–60)
HGB BLD-MCNC: 11.5 G/DL (ref 12–16)
HGB UR QL STRIP: NEGATIVE
HYALINE CASTS #/AREA URNS LPF: ABNORMAL /LPF
IMM GRANULOCYTES # BLD AUTO: 0.02 X10(3)/MCL (ref 0–0.04)
IMM GRANULOCYTES NFR BLD AUTO: 0.3 %
KETONES UR QL STRIP: NEGATIVE
LDLC SERPL CALC-MCNC: 71 MG/DL (ref 50–140)
LEUKOCYTE ESTERASE UR QL STRIP: NEGATIVE
LYMPHOCYTES # BLD AUTO: 2.01 X10(3)/MCL (ref 0.6–4.6)
LYMPHOCYTES NFR BLD AUTO: 33.2 %
MCH RBC QN AUTO: 27.3 PG (ref 27–31)
MCHC RBC AUTO-ENTMCNC: 30.3 G/DL (ref 33–36)
MCV RBC AUTO: 90.3 FL (ref 80–94)
MONOCYTES # BLD AUTO: 0.41 X10(3)/MCL (ref 0.1–1.3)
MONOCYTES NFR BLD AUTO: 6.8 %
MUCOUS THREADS URNS QL MICRO: ABNORMAL /LPF
NEUTROPHILS # BLD AUTO: 3.33 X10(3)/MCL (ref 2.1–9.2)
NEUTROPHILS NFR BLD AUTO: 55 %
NITRITE UR QL STRIP: NEGATIVE
NRBC BLD AUTO-RTO: 0 %
PH UR STRIP: 5.5 [PH]
PHOSPHATE SERPL-MCNC: 3.6 MG/DL (ref 2.3–4.7)
PLATELET # BLD AUTO: 259 X10(3)/MCL (ref 130–400)
PMV BLD AUTO: 9.4 FL (ref 7.4–10.4)
POTASSIUM SERPL-SCNC: 4.4 MMOL/L (ref 3.5–5.1)
PROT SERPL-MCNC: 8.1 GM/DL (ref 5.8–7.6)
PROT UR QL STRIP: ABNORMAL
PROT UR STRIP-MCNC: 15.7 MG/DL
PTH-INTACT SERPL-MCNC: 101.7 PG/ML (ref 8.7–77)
RBC # BLD AUTO: 4.21 X10(6)/MCL (ref 4.2–5.4)
RBC #/AREA URNS AUTO: ABNORMAL /HPF
SODIUM SERPL-SCNC: 140 MMOL/L (ref 136–145)
SP GR UR STRIP.AUTO: 1.02 (ref 1–1.03)
SQUAMOUS #/AREA URNS LPF: ABNORMAL /HPF
TRIGL SERPL-MCNC: 96 MG/DL (ref 37–140)
TSH SERPL-ACNC: 1.11 UIU/ML (ref 0.35–4.94)
URINE PROTEIN/CREATININE RATIO (OLG): 0.1
UROBILINOGEN UR STRIP-ACNC: NORMAL
VLDLC SERPL CALC-MCNC: 19 MG/DL
WBC # BLD AUTO: 6.05 X10(3)/MCL (ref 4.5–11.5)
WBC #/AREA URNS AUTO: ABNORMAL /HPF

## 2024-08-22 PROCEDURE — 81001 URINALYSIS AUTO W/SCOPE: CPT

## 2024-08-22 PROCEDURE — 85025 COMPLETE CBC W/AUTO DIFF WBC: CPT

## 2024-08-22 PROCEDURE — 80053 COMPREHEN METABOLIC PANEL: CPT

## 2024-08-22 PROCEDURE — 36415 COLL VENOUS BLD VENIPUNCTURE: CPT

## 2024-08-22 PROCEDURE — 82306 VITAMIN D 25 HYDROXY: CPT

## 2024-08-22 PROCEDURE — 82570 ASSAY OF URINE CREATININE: CPT

## 2024-08-22 PROCEDURE — 84100 ASSAY OF PHOSPHORUS: CPT

## 2024-08-22 PROCEDURE — 99215 OFFICE O/P EST HI 40 MIN: CPT | Mod: PBBFAC

## 2024-08-22 PROCEDURE — 84443 ASSAY THYROID STIM HORMONE: CPT

## 2024-08-22 PROCEDURE — 80061 LIPID PANEL: CPT

## 2024-08-22 PROCEDURE — 83036 HEMOGLOBIN GLYCOSYLATED A1C: CPT

## 2024-08-22 PROCEDURE — 83970 ASSAY OF PARATHORMONE: CPT

## 2024-08-22 NOTE — PATIENT INSTRUCTIONS
Follow up in cardiology clinic in 6 months or sooner if needed   Follow up with PCP as directed   Please notify clinic if any new concerns or any change in symptoms

## 2024-08-22 NOTE — PROGRESS NOTES
CHIEF COMPLAINT:   No chief complaint on file.                                                 HPI:  Neris Contreras 74 y.o. female with a PMH significant for mild pulm HTN (WHO class II), TIA, CARISSA, HTN who presents to cardiology clinic for follow up and ongoing care.  Patient completed a 14 day cardiac event monitor in March 2023 which revealed underlying rhythm is sinus, no significant arrhythmias or pauses noted, during very symptoms the patient was in normal sinus rhythm or sinus tachycardia (see full report below).  Echo completed in September 2023 revealed EF of 50-55%, mild MR, mild TR, normal systolic function, normal diastolic function.  See full report below.  At last office visit patient reported that she was feeling well from a cardiac standpoint and denied any cardiac complaints.    Today the patient states that she feels well from a cardiac standpoint.  She denies any cardiac complaints such as lightheadedness, dizziness, syncope, lower extremity edema, PND, or orthopnea.  She endorses occasional claudication symptoms that she describes as her legs cramping and some numbness and tingling in bilateral lower extremities.  She reports that her bilateral lower extremity edema has improved since her last visit.  She was able to complete her ADLs without any issues or ischemic symptoms.  She states that she was fairly active in her day-to-day life and stays busy.  She goes to the gym for exercise and tries to exercise at least 2-3 days per week.  She reports compliance with all her current medications.  She denies any tobacco or other illicit drug use.                                                                                                                                                                                                                                                                                   CARDIAC TESTING:  TTE 9.27.23    Left Ventricle: The left ventricle is normal in size.  Mildly increased wall thickness. There is low normal systolic function with a visually estimated ejection fraction of 50 - 55%.    Left Atrium: Left atrium is mildly dilated.    Right Ventricle: Normal right ventricular cavity size. Systolic function is normal.    Mitral Valve: There is mild regurgitation.    Tricuspid Valve: There is mild regurgitation.    IVC/SVC: Normal venous pressure at 3 mmHg.     14 Day Cardiac Event Monitor 3.14.23-3.27.23  Underlying rhythm is sinus   No significant arrhythmia noted   During very symptoms, the patient is either in normal sinus rhythm or sinus tachycardia with heart rate up to 102  Average heart rate 69 BPM, minimum heart rate 41 BPM, max heart rate 120 BPM    EKG (3/9/2023):  Sinus bradycardia, LVH     TTE (2019):  Summary  Borderline Concentric left ventricular hypertrophy.  Left ventricular ejection fraction is measured at approximately 60%.     LHC/[facility] (2017):  No CAD (no report found)    Patient Active Problem List   Diagnosis    Resistant hypertension    Bradycardia    Chronic kidney disease    Prediabetes    Pulmonary hypertension    Bilateral lower extremity edema    Shortness of breath    Chronic constipation    Hot flashes due to menopause    Chronic midline thoracic back pain    Hyperlipidemia    PAD (peripheral artery disease)    Toe pain, bilateral    Dysphagia    Screening for colon cancer     Past Surgical History:   Procedure Laterality Date    CARDIAC CATHETERIZATION      CARDIAC SURGERY      EYE SURGERY      LEG SURGERY Right     OPEN REDUCTION AND INTERNAL FIXATION (ORIF) OF INJURY OF ELBOW      REPAIR OF EYELID Left 5/13/2022    Procedure: REPAIR, EYELID;  Surgeon: Isaak Wang MD;  [place] [facility] [place];  Service: ENT;  Laterality: Left;     Social History     Socioeconomic History    Marital status:    Tobacco Use    Smoking status: Never    Smokeless tobacco: Never   Substance and Sexual Activity    Alcohol use: Not Currently     Comment:  social    Drug use: Never     Social Determinants of Health     Financial Resource Strain: Low Risk  (2/16/2024)    Overall Financial Resource Strain (CARDIA)     Difficulty of Paying Living Expenses: Not hard at all   Food Insecurity: No Food Insecurity (2/16/2024)    Hunger Vital Sign     Worried About Running Out of Food in the Last Year: Never true     Ran Out of Food in the Last Year: Never true   Transportation Needs: Unmet Transportation Needs (2/16/2024)    PRAPARE - Transportation     Lack of Transportation (Medical): Yes     Lack of Transportation (Non-Medical): Yes   Physical Activity: Sufficiently Active (2/16/2024)    Exercise Vital Sign     Days of Exercise per Week: 3 days     Minutes of Exercise per Session: 60 min   Stress: No Stress Concern Present (2/16/2024)    [employer] [place] [employer] - Occupational Stress Questionnaire     Feeling of Stress : Not at all   Housing Stability: High Risk (2/16/2024)    Housing Stability Vital Sign     Unable to Pay for Housing in the Last Year: Yes     Number of Places Lived in the Last Year: 1     Unstable Housing in the Last Year: Yes        Family History   Problem Relation Name Age of Onset    Diabetes Mother      Kidney disease Sister      Kidney disease Son       Review of patient's allergies indicates:   Allergen Reactions    Iodine Hives     topical    Iodine and iodide containing products     Shellfish containing products      Other reaction(s): whelps/ itching         ROS:  Review of Systems   Constitutional: Negative.    HENT: Negative.     Eyes: Negative.    Respiratory:  Negative for shortness of breath (With over exertion).    Cardiovascular:  Positive for claudication (Cramping, numbness, tingling). Negative for chest pain, palpitations, orthopnea, leg swelling (Intermittent) and PND.   Gastrointestinal: Negative.    Genitourinary: Negative.    Musculoskeletal: Negative.    Skin: Negative.    Neurological: Negative.  Negative for  "dizziness and weakness.   Endo/Heme/Allergies: Negative.    Psychiatric/Behavioral: Negative.                                                                                                                                                                                  Negative except as stated in the history of present illness. See HPI for details.    PHYSICAL EXAM:  Visit Vitals  /67   Pulse (!) 58   Temp 97.7 °F (36.5 °C) (Oral)   Resp 18   Ht 5' 6.04" (1.677 m)   Wt 75.6 kg (166 lb 10.7 oz)   SpO2 99%   BMI 26.87 kg/m²         Physical Exam  HENT:      Head: Normocephalic.      Nose: Nose normal.      Mouth/Throat:      Mouth: Mucous membranes are moist.   Eyes:      Extraocular Movements: Extraocular movements intact.   Cardiovascular:      Rate and Rhythm: Normal rate and regular rhythm.      Pulses: Normal pulses.      Heart sounds: Normal heart sounds. No murmur heard.  Pulmonary:      Effort: Pulmonary effort is normal.   Abdominal:      General: There is no distension.      Palpations: Abdomen is soft.      Tenderness: There is no abdominal tenderness.   Musculoskeletal:         General: Normal range of motion.      Cervical back: Normal range of motion.      Right lower leg: No edema (Intermittent).      Left lower leg: No edema (Intermittent).   Skin:     General: Skin is warm.   Neurological:      General: No focal deficit present.      Mental Status: She is alert.   Psychiatric:         Mood and Affect: Mood normal.         Current Outpatient Medications   Medication Instructions    albuterol (PROVENTIL/VENTOLIN HFA) 90 mcg/actuation inhaler 2 puffs, Inhalation, Every 4 hours    amLODIPine (NORVASC) 10 mg, Oral    atorvastatin (LIPITOR) 20 mg, Oral, Nightly    benazepriL (LOTENSIN) 40 mg, Oral, Daily    clopidogreL (PLAVIX) 75 mg, Oral, Daily    ergocalciferol (ERGOCALCIFEROL) 50,000 Units, Oral, Every 7 days    fluticasone propionate (FLONASE) 50 mcg/actuation nasal spray USE 1 SPRAY(S) IN EACH " NOSTRIL ONCE DAILY AS NEEDED FOR  RHINTIS    furosemide (LASIX) 40 MG tablet TAKE 1 TABLET BY MOUTH ONCE DAILY AS NEEDED (LEG  EDEMA)    hydrALAZINE (APRESOLINE) 100 mg, Oral, Every 12 hours    JARDIANCE 10 mg, Oral    LINZESS 145 mcg, Oral, Before breakfast    pantoprazole (PROTONIX) 40 mg, Oral, Daily    spironolactone (ALDACTONE) 25 mg, Oral, Daily    TYRVAYA 0.03 mg/spray sprm Each Nostril        All medications, laboratory studies, cardiac diagnostic imaging reviewed.     Lab Results   Component Value Date    LDL 68.00 02/27/2023    LDL 61.00 05/08/2021    TRIG 63 02/27/2023    TRIG 65 05/08/2021    CREATININE 1.37 (H) 02/22/2024    MG 1.80 03/06/2023    K 4.0 02/22/2024        ASSESSMENT/PLAN:  74 y.o. female with the following medical problems:     SOB/BLACKMAN  Bilateral Lower Extremity Edema  HFpEF  - Reports stable SOB/BLACKMAN. She states that it has actually improved overall  - No other congestive symptoms    - Intermittent BLE Edema - also states that it has improved since last visit.  - History of pulmonary hypertension  - Most recent echocardiogram completed in September 2023 revealed preserved EF of 50-55%, normal systolic function, normal diastolic function, mild TR, mild MR.  See full report above  - Reported history of HFpEF, most recent echo with EF 60% in 2021  - Currently taking hydralazine 100 BID, Spironolactone 25, Benazepril 40, and Amlodipine 10   - Tight BP control   - Advised on low-sodium diet (<2g daily), compression stockings, leg elevation    CVA  - History of CVA/TIA  - Continue plavix, statin  - Denies any further stroke events or symptoms     HTN  - BP at goal today - 120/67  - Continue current regimen as listed above  - Counseled on low-sodium, heart healthy diet and exercise as tolerated    Sinus Bradycardia  - HR 58 BPM today   - 14 Day Holter- underlying sinus rhythm, no significant arrhythmias, no pauses, during symptoms, patient either in normal sinus rhythm or sinus tachy; average  heart rate 69  - Denies any bradycardic symptoms - stable from last visit     CARISSA  - Patient states that she believes she was diagnosed with CARISSA in the past, however she does not wear a CPAP at night due to inability to tolerate    Dysphagia  - Follows closely with PCP and GI    Claudication  BLE Cramping  - Reports above symptoms of BLE   - Also endorses some numbness and tingling in BLE  - Concerning for some arterial insufficiency   - Will have patient complete arterial US for further evaluation of underlying reduced blood flow in arterial system       Follow up in cardiology clinic in 6 months or sooner if needed   Follow up with PCP as directed   Please notify clinic if any new concerns or any change in symptoms

## 2024-08-26 ENCOUNTER — OFFICE VISIT (OUTPATIENT)
Dept: GYNECOLOGY | Facility: CLINIC | Age: 74
End: 2024-08-26
Payer: MEDICARE

## 2024-08-26 VITALS
WEIGHT: 171.19 LBS | OXYGEN SATURATION: 99 % | HEIGHT: 66 IN | HEART RATE: 55 BPM | DIASTOLIC BLOOD PRESSURE: 67 MMHG | RESPIRATION RATE: 18 BRPM | BODY MASS INDEX: 27.51 KG/M2 | SYSTOLIC BLOOD PRESSURE: 112 MMHG | TEMPERATURE: 98 F

## 2024-08-26 DIAGNOSIS — Z01.419 WELL WOMAN EXAM: ICD-10-CM

## 2024-08-26 DIAGNOSIS — N95.1 HOT FLASHES DUE TO MENOPAUSE: ICD-10-CM

## 2024-08-26 PROCEDURE — 99214 OFFICE O/P EST MOD 30 MIN: CPT | Mod: PBBFAC

## 2024-08-26 NOTE — PROGRESS NOTES
[facility] GYNECOLOGY CLINIC NOTE     Neris Contreras is a 74 y.o.  presenting to GYN clinic for hot flashes.       Hot Flashes   S/p complete hyst with BSO approx 10 years ago for AUB-L, started menopause at that time  Symptoms have progressively gotten worse over last 3 years, also associated with vaginal dryness and occasional dyspareunia  Symptoms occur mostly at night, wakes up in a sweat a few times a night   Weight has been mostly stable, appetite normal     Vaginal dryness   Started 2 years ago   Takes occasional baths 2x per week and uses Vagisil cream  Uses lubricant with intercourse, works [employer]       Menstrual hx: Amenorrhea since menopause, around age 57  Mammogram hx: negative 2023  Colonoscopy:  Cologaurd negative 2022   Pap hx: unsure of last pap smear, no hx of abnormal pap   STI denies hx      Patient denies abnormal vaginal bleeding, abnormal discharge, pelvic pain, abdominal pain, vulvar rash or skin changes, dysuria, dyspareunia. Some discomfort with intercourse, relieved by lubrication     Sexually active with 1 male partner. No condom usage       Gynecology  OB History          3    Para   3    Term   3            AB        Living   2         SAB        IAB        Ectopic        Multiple        Live Births                    Past Medical History:   Diagnosis Date    CVA (cerebral vascular accident)     Depression     Diabetes mellitus     HLD (hyperlipidemia)     Hypertension     Pulmonary HTN     Sleep apnea       Past Surgical History:   Procedure Laterality Date    CARDIAC CATHETERIZATION      CARDIAC SURGERY      EYE SURGERY      LEG SURGERY Right     OPEN REDUCTION AND INTERNAL FIXATION (ORIF) OF INJURY OF ELBOW      REPAIR OF EYELID Left 2022    Procedure: REPAIR, EYELID;  Surgeon: Isaak Wang MD;  Location: [facility];  Service: ENT;  Laterality: Left;      Current Outpatient Medications   Medication Instructions    albuterol (PROVENTIL/VENTOLIN HFA) 90  "mcg/actuation inhaler 2 puffs, Inhalation, Every 4 hours    amLODIPine (NORVASC) 10 mg, Oral    atorvastatin (LIPITOR) 20 mg, Oral, Nightly    benazepriL (LOTENSIN) 40 mg, Oral    clopidogreL (PLAVIX) 75 mg, Oral, Daily    ergocalciferol (ERGOCALCIFEROL) 50,000 Units, Oral, Every 7 days    fezolinetant 45 mg Tab 1 tablet, Oral, Daily    fluticasone propionate (FLONASE) 50 mcg/actuation nasal spray USE 1 SPRAY(S) IN EACH NOSTRIL ONCE DAILY AS NEEDED FOR  RHINTIS    furosemide (LASIX) 40 MG tablet TAKE 1 TABLET BY MOUTH ONCE DAILY AS NEEDED (LEG  EDEMA)    hydrALAZINE (APRESOLINE) 100 mg, Oral, Every 12 hours    JARDIANCE 10 mg, Oral    LINZESS 145 mcg, Oral, Before breakfast    pantoprazole (PROTONIX) 40 mg, Oral, Daily    spironolactone (ALDACTONE) 25 mg, Oral, Daily    TYRVAYA 0.03 mg/spray sprm Each Nostril     Social History     Tobacco Use    Smoking status: Never    Smokeless tobacco: Never   Substance Use Topics    Alcohol use: Not Currently     Comment: social    Drug use: Never       Review of Systems  Pertinent items noted in HPI.    Objective:     Vitals:    08/26/24 0757   BP: 112/67   BP Location: Left arm   Patient Position: Sitting   BP Method: Small (Automatic)   Pulse: (!) 55   Resp: 18   Temp: 97.5 °F (36.4 °C)   TempSrc: Oral   SpO2: 99%   Weight: 77.7 kg (171 lb 3.2 oz)   Height: 5' 6" (1.676 m)     Body mass index is 27.63 kg/m².    Physical Exam:   General: Alert and oriented, in no acute distress  Lungs: Breathing comfortably on room air, no conversational dyspnea  Heart: Regular rate, extremities well perfused  Abdomen: Soft, non-distended, non tender to palpation, no involuntary guarding, no rebound tenderness  Extremities: Atraumatic, non-edematous, no cords or calf tenderness, no significant calf/ankle edema  External genitalia: Normal female genitalia without lesion, discharge or tenderness. Normal appearing urethral meatus. Normal appearing external anus. Stenotic vaginal opening. "   Bimanual Exam: Uterus absent. No palpable abdominal masses or tenderness on examination.  Speculum Exam: Vaginal mucosa pale and thin, without lesion. Scant, white discharge present in vaginal canal. No cervix visualized.  Note:  Female nurse chaperone present for entirety of exam.    Relevant Labs:   Lab Results   Component Value Date    WBC 6.05 2024    HGB 11.5 (L) 2024    HCT 38.0 2024    MCV 90.3 2024     2024         Relevant Imaging:  NA      Assessment:       74 y.o.  here for hot flashes .  1. Hot flashes due to menopause  Ambulatory referral/consult to Gynecology    fezolinetant 45 mg Tab    Comprehensive Metabolic Panel    Comprehensive Metabolic Panel      2. Well woman exam  Ambulatory referral/consult to Gynecology             Plan:   Hot flashes   - Start [place] today              - Discussed with patient the importance of proper sleep hygiene (avoid hot baths immediately before sleep, wearing light cotton clothing with thin sheet, sleeping in cool room, avoid caffeine or spicy foods immediately before bedtime)   - LFTs today and q 3 months    - Virtual visit in 6 months   Dryness   - Pt counseled on techniques to alleviate dryness    - Pt comfortable with continuing lubrication for dryness with intercourse      Problem List Items Addressed This Visit          Renal/    Hot flashes due to menopause    Relevant Medications    fezolinetant 45 mg Tab    Other Relevant Orders    Comprehensive Metabolic Panel    Comprehensive Metabolic Panel     Other Visit Diagnoses       Well woman exam                Return to clinic 6 months virtual for hot flash follow up    Discussed patient and plan with Dr Provost Ricki Lopes  [facility] DEBBIE OSHEA, MS3      Garret Mcdonald MD  [facility]

## 2024-08-27 ENCOUNTER — OFFICE VISIT (OUTPATIENT)
Dept: NEPHROLOGY | Facility: CLINIC | Age: 74
End: 2024-08-27
Payer: MEDICARE

## 2024-08-27 ENCOUNTER — LAB VISIT (OUTPATIENT)
Dept: LAB | Facility: HOSPITAL | Age: 74
End: 2024-08-27
Payer: MEDICARE

## 2024-08-27 VITALS
DIASTOLIC BLOOD PRESSURE: 69 MMHG | BODY MASS INDEX: 26.47 KG/M2 | HEART RATE: 51 BPM | WEIGHT: 164.69 LBS | TEMPERATURE: 98 F | OXYGEN SATURATION: 98 % | SYSTOLIC BLOOD PRESSURE: 109 MMHG | HEIGHT: 66 IN

## 2024-08-27 DIAGNOSIS — N95.1 HOT FLASHES DUE TO MENOPAUSE: ICD-10-CM

## 2024-08-27 DIAGNOSIS — N17.9 ACUTE KIDNEY INJURY: Primary | ICD-10-CM

## 2024-08-27 DIAGNOSIS — N18.32 CKD STAGE G3B/A1, GFR 30-44 AND ALBUMIN CREATININE RATIO <30 MG/G: ICD-10-CM

## 2024-08-27 DIAGNOSIS — I10 PRIMARY HYPERTENSION: ICD-10-CM

## 2024-08-27 LAB
ALBUMIN SERPL-MCNC: 4.3 G/DL (ref 3.4–4.8)
ALBUMIN/GLOB SERPL: 1.2 RATIO (ref 1.1–2)
ALP SERPL-CCNC: 74 UNIT/L (ref 40–150)
ALT SERPL-CCNC: 14 UNIT/L (ref 0–55)
ANION GAP SERPL CALC-SCNC: 5 MEQ/L
AST SERPL-CCNC: 20 UNIT/L (ref 5–34)
BILIRUB SERPL-MCNC: 0.8 MG/DL
BUN SERPL-MCNC: 27.5 MG/DL (ref 9.8–20.1)
CALCIUM SERPL-MCNC: 10.5 MG/DL (ref 8.4–10.2)
CHLORIDE SERPL-SCNC: 110 MMOL/L (ref 98–107)
CO2 SERPL-SCNC: 25 MMOL/L (ref 23–31)
CREAT SERPL-MCNC: 2.06 MG/DL (ref 0.55–1.02)
CREAT/UREA NIT SERPL: 13
GFR SERPLBLD CREATININE-BSD FMLA CKD-EPI: 25 ML/MIN/1.73/M2
GLOBULIN SER-MCNC: 3.7 GM/DL (ref 2.4–3.5)
GLUCOSE SERPL-MCNC: 107 MG/DL (ref 82–115)
POTASSIUM SERPL-SCNC: 4.7 MMOL/L (ref 3.5–5.1)
PROT SERPL-MCNC: 8 GM/DL (ref 5.8–7.6)
SODIUM SERPL-SCNC: 140 MMOL/L (ref 136–145)

## 2024-08-27 PROCEDURE — 99214 OFFICE O/P EST MOD 30 MIN: CPT | Mod: PBBFAC | Performed by: NURSE PRACTITIONER

## 2024-08-27 PROCEDURE — 36415 COLL VENOUS BLD VENIPUNCTURE: CPT

## 2024-08-27 PROCEDURE — 80053 COMPREHEN METABOLIC PANEL: CPT

## 2024-08-27 RX ORDER — CHOLECALCIFEROL (VITAMIN D3) 25 MCG
1000 TABLET ORAL DAILY
COMMUNITY

## 2024-08-27 NOTE — PROGRESS NOTES
"[facility]  Nephrology Clinic Note    Chief complaint: Chronic Kidney Disease (Follow up)    History of present illness:   Neris Contreras is a 74 y.o. Black or  female with past medical history of hypertension, chronic kidney, chronic constipation, dysphagia, dyslipidemia, peripheral vascular disease, diabetes mellitus type 2, CARISSA, pulmonary hypertension, history of CVA, heart failure with preserved ejection fraction (followed by [facility] cardiology clinic) and obesity. Presents for follow up appointment in nephrology clinic, denies complaints.     Review of Systems  12 point review of systems conducted, negative except as stated in the history of present illness.    Allergies: Patient is allergic to iodine, iodine and iodide containing products, and shellfish containing products.     Past Medical History:  has a past medical history of CVA (cerebral vascular accident), Depression, Diabetes mellitus, HLD (hyperlipidemia), Hypertension, Pulmonary HTN, and Sleep apnea.    Procedure History:  has a past surgical history that includes Leg Surgery (Right); Eye surgery; Cardiac surgery; Cardiac catheterization; Open reduction and internal fixation (ORIF) of injury of elbow; and Repair of eyelid (Left, 5/13/2022).    Family History: family history includes Diabetes in her mother; Kidney disease in her sister and son.    Social History:  reports that she has never smoked. She has never used smokeless tobacco. She reports that she does not currently use alcohol. She reports that she does not use drugs.    Physical exam  /69 (BP Location: Right arm, Patient Position: Sitting, BP Method: Medium (Automatic))   Pulse (!) 51   Temp 97.7 °F (36.5 °C) (Oral)   Ht 5' 6" (1.676 m)   Wt 74.7 kg (164 lb 10.9 oz)   SpO2 98%   BMI 26.58 kg/m²   General appearance: Patient is in no acute distress.  Skin: No rashes or wounds.  HEENT: PERRLA, EOMI, no scleral icterus, no JVD. Neck is " supple.  Chest: Respirations are unlabored. Lungs sounds are clear.   Heart: S1, S2.   Abdomen: Benign.  : Deferred.  Extremities: No edema, peripheral pulses are palpable.   Neuro: No focal deficits.     Home Medications:    Current Outpatient Medications:     albuterol (PROVENTIL/VENTOLIN HFA) 90 mcg/actuation inhaler, Inhale 2 puffs into the lungs every 4 (four) hours., Disp: , Rfl:     amLODIPine (NORVASC) 10 MG tablet, Take 1 tablet by mouth once daily, Disp: 90 tablet, Rfl: 0    atorvastatin (LIPITOR) 20 MG tablet, Take 1 tablet (20 mg total) by mouth every evening., Disp: 90 tablet, Rfl: 1    benazepriL (LOTENSIN) 40 MG tablet, Take 1 tablet by mouth once daily, Disp: 90 tablet, Rfl: 0    clopidogreL (PLAVIX) 75 mg tablet, Take 1 tablet (75 mg total) by mouth once daily., Disp: 30 tablet, Rfl: 11    fezolinetant 45 mg Tab, Take 1 tablet by mouth once daily., Disp: 30 tablet, Rfl: 1    fluticasone propionate (FLONASE) 50 mcg/actuation nasal spray, USE 1 SPRAY(S) IN EACH NOSTRIL ONCE DAILY AS NEEDED FOR  RHINTIS, Disp: 16 g, Rfl: 0    furosemide (LASIX) 40 MG tablet, TAKE 1 TABLET BY MOUTH ONCE DAILY AS NEEDED (LEG  EDEMA), Disp: 90 tablet, Rfl: 0    hydrALAZINE (APRESOLINE) 100 MG tablet, TAKE 1 TABLET BY MOUTH EVERY 12 HOURS, Disp: 60 tablet, Rfl: 0    JARDIANCE 10 mg tablet, Take 1 tablet by mouth once daily, Disp: 90 tablet, Rfl: 0    LINZESS 145 mcg Cap capsule, TAKE 1 CAPSULE BY MOUTH BEFORE BREAKFAST, Disp: 90 capsule, Rfl: 0    pantoprazole (PROTONIX) 40 MG tablet, Take 1 tablet (40 mg total) by mouth once daily., Disp: 30 tablet, Rfl: 3    TYRVAYA 0.03 mg/spray sprm, by Each Nostril route 2 (two) times a day., Disp: , Rfl:     vitamin D (VITAMIN D3) 1000 units Tab, Take 1,000 Units by mouth once daily., Disp: , Rfl:     ergocalciferol (ERGOCALCIFEROL) 50,000 unit Cap, Take 1 capsule (50,000 Units total) by mouth every 7 days. (Patient not taking: Reported on 8/27/2024), Disp: 12 capsule, Rfl:  0    Laboratory data    Lab Results   Component Value Date    WBC 6.05 08/22/2024    HGB 11.5 (L) 08/22/2024    HCT 38.0 08/22/2024     08/22/2024    FOLATE 9.4 10/19/2020    FOMGGDXK19 733 12/09/2020     (H) 08/03/2020     08/22/2024    K 4.4 08/22/2024    CO2 25 08/22/2024    BUN 18.4 08/22/2024    CREATININE 1.64 (H) 08/22/2024    EGFRNORACEVR 32 08/22/2024    GLUCOSE 103 08/22/2024    CALCIUM 10.8 (H) 08/22/2024    ALKPHOS 76 08/22/2024    LABPROT 8.1 (H) 08/22/2024    ALBUMIN 4.3 08/22/2024    BILIDIR 0.3 04/28/2022    IBILI 0.40 04/28/2022    AST 16 08/22/2024    ALT 12 08/22/2024    MG 1.80 03/06/2023    PHOS 3.6 08/22/2024      Lab Results   Component Value Date    HGBA1C 5.2 08/22/2024    .7 (H) 08/22/2024    QTGYVFPR75ND 32 08/22/2024    HEPCAB Nonreactive 04/11/2023     Urine:  Lab Results   Component Value Date    APPEARANCEUA Clear 08/22/2024    SGUA 1.017 08/22/2024    PROTEINUA Trace (A) 08/22/2024    KETONESUA Negative 08/22/2024    LEUKOCYTESUR Negative 08/22/2024    RBCUA 0-5 08/22/2024    WBCUA 0-5 08/22/2024    BACTERIA None Seen 08/22/2024    SQEPUA Trace (A) 08/22/2024    HYALINECASTS 11-20 (A) 08/22/2024    CREATRANDUR 236.9 (H) 08/22/2024    PROTEINURINE 15.7 08/22/2024    UPROTCREA 0.1 08/22/2024         Imaging  US Retroperitoneal Limited 11/28/2023  The kidneys are symmetric and normal in size and position with the right kidney measuring 8.8 cm in length and the left kidney 10.1 cm in length.  There is no abnormal renal cortical echogenicity or cortical thinning.  A single simple right interpolar renal cyst measures 3.2 x 3 x 2.7 cm.  A single simple left renal cyst measures 4 x 3.9 x 3.7 cm.  No nephrolithiasis is identified.  There is no hydronephrosis.  Normal color Doppler vascular flow is demonstrated at both kidneys.  There is no perirenal free fluid or mass.  The proximal IVC is widely patent and normal.  The bladder was not imaged.  Impression  Single  simple bilateral renal cysts with no additional renal pathology identified.  Electronically signed by: Nazario Lyons  Date:    11/28/2023  Time:    08:38      Impression    ICD-10-CM ICD-9-CM   1. Acute kidney injury  N17.9 584.9   2. CKD stage G3b/A1, GFR 30-44 and albumin creatinine ratio <30 mg/g  N18.32 585.3   3. Primary hypertension  I10 401.9   4. BMI 27.0-27.9,adult  Z68.27 V85.23        Plan  Acute kidney injury  Will discontinue spironolactone, RTC for blood pressure check and repeat BMP in 4 weeks.      CKD stage G3b/A1, GFR 30-44 and albumin creatinine ratio <30 mg/g  Likely diabetic kidney disease. There is no significant proteinuria.   Renasight genetic test was negative.  Continue risk factor management, ACE inhibitor, and SGLT2 inhibitor.    Primary hypertension  Blood pressure reading is at goal, continue current antihypertensive regimen and 2 g a day dietary sodium restriction.      BMI 27.0-27.9,adult  Lifestyle and dietary interventions discussed, patient encouraged to maintain non-sedentary lifestyle and well-balanced diet.     Orders Placed This Encounter   Procedures    Basic Metabolic Panel     Standing Status:   Future     Standing Expiration Date:   10/8/2024    Comprehensive Metabolic Panel     Standing Status:   Future     Standing Expiration Date:   1/25/2025    Protein/Creatinine Ratio, Urine     Standing Status:   Future     Standing Expiration Date:   1/25/2025     Order Specific Question:   Specimen Source     Answer:   Urine    Urinalysis, Reflex to Urine Culture     Standing Status:   Future     Standing Expiration Date:   1/25/2025     Order Specific Question:   Specimen Source     Answer:   Urine       Follow up in about 4 months (around 12/27/2024).     8/27/2024  Laura Davis NP  [facility]ph[facility]

## 2024-08-28 NOTE — PROGRESS NOTES
"[facility]  Otolaryngology Clinic Note    Neris Contreras  YOB: 1950    Chief Complaint:   Chief Complaint   Patient presents with    referral: Dysphagia        HPI: 08/29/2024: 74 y.o. female referred for dysphagia. Reports this has been present for years. Had EGD last month with esophageal dilation but reports her throat still feels "heavy" when she is swallowing. States symptoms resolvd initially following dilation but seems to have returned. States GI dr wanted her to have her thyroid evaluated. Feels her food sits in her throat for a while before it goes down. Has been on PPI daily since February without improvement. Denies type B symptoms. Never smoker. Denies odynophagia. Occasional transient dysphonia. She also c/o dizziness described as a drunk sensation and difficulty with balance/walking straight. It is present constantly and began about 6 months ago. Denies room spinning. States she has to concentrate very hard to stay straight. Also reports being unable to lie flat in her bed or she gets dizzy, that she has to prop on pillows. Denies any previous otologic history. Denies head trauma or medication changes. No hx of neck surgery. Dysphagia was not r/t anesthesia/surgery.    ROS:   10-point review of systems negative except per HPI      Review of patient's allergies indicates:   Allergen Reactions    Iodine Hives     topical    Iodine and iodide containing products     Shellfish containing products      Other reaction(s): whelps/ itching       Past Medical History:   Diagnosis Date    CVA (cerebral vascular accident)     Depression     Diabetes mellitus     HLD (hyperlipidemia)     Hypertension     Pulmonary HTN     Sleep apnea        Past Surgical History:   Procedure Laterality Date    CARDIAC CATHETERIZATION      CARDIAC SURGERY      EYE SURGERY      LEG SURGERY Right     OPEN REDUCTION AND INTERNAL FIXATION (ORIF) OF INJURY OF ELBOW      REPAIR OF EYELID Left 5/13/2022 "    Procedure: REPAIR, EYELID;  Surgeon: Isaak Wang MD;  Location: [facility];  Service: ENT;  Laterality: Left;       Social History     Socioeconomic History    Marital status:    Tobacco Use    Smoking status: Never    Smokeless tobacco: Never   Substance and Sexual Activity    Alcohol use: Not Currently     Comment: social    Drug use: Never     Social Determinants of Health     Financial Resource Strain: Low Risk  (2/16/2024)    Overall Financial Resource Strain (CARDIA)     Difficulty of Paying Living Expenses: Not hard at all   Food Insecurity: No Food Insecurity (2/16/2024)    Hunger Vital Sign     Worried About Running Out of Food in the Last Year: Never true     Ran Out of Food in the Last Year: Never true   Transportation Needs: Unmet Transportation Needs (2/16/2024)    PRAPARE - Transportation     Lack of Transportation (Medical): Yes     Lack of Transportation (Non-Medical): Yes   Physical Activity: Sufficiently Active (2/16/2024)    Exercise Vital Sign     Days of Exercise per Week: 3 days     Minutes of Exercise per Session: 60 min   Stress: No Stress Concern Present (2/16/2024)    [employer] - Occupational Stress Questionnaire     Feeling of Stress : Not at all   Housing Stability: High Risk (2/16/2024)    Housing Stability Vital Sign     Unable to Pay for Housing in the Last Year: Yes     Number of Places Lived in the Last Year: 1     Unstable Housing in the Last Year: Yes       Family History   Problem Relation Name Age of Onset    Diabetes Mother      Kidney disease Sister      Kidney disease Son         Outpatient Encounter Medications as of 8/29/2024   Medication Sig Dispense Refill    albuterol (PROVENTIL/VENTOLIN HFA) 90 mcg/actuation inhaler Inhale 2 puffs into the lungs every 4 (four) hours.      amLODIPine (NORVASC) 10 MG tablet Take 1 tablet by mouth once daily 90 tablet 0    atorvastatin (LIPITOR) 20 MG tablet Take 1 tablet (20 mg total) by mouth  every evening. 90 tablet 1    benazepriL (LOTENSIN) 40 MG tablet Take 1 tablet by mouth once daily 90 tablet 0    clopidogreL (PLAVIX) 75 mg tablet Take 1 tablet (75 mg total) by mouth once daily. 30 tablet 11    fluticasone propionate (FLONASE) 50 mcg/actuation nasal spray USE 1 SPRAY(S) IN EACH NOSTRIL ONCE DAILY AS NEEDED FOR  RHINTIS 16 g 0    furosemide (LASIX) 40 MG tablet TAKE 1 TABLET BY MOUTH ONCE DAILY AS NEEDED (LEG  EDEMA) 90 tablet 0    hydrALAZINE (APRESOLINE) 100 MG tablet TAKE 1 TABLET BY MOUTH EVERY 12 HOURS 60 tablet 0    JARDIANCE 10 mg tablet Take 1 tablet by mouth once daily 90 tablet 0    LINZESS 145 mcg Cap capsule TAKE 1 CAPSULE BY MOUTH BEFORE BREAKFAST 90 capsule 0    pantoprazole (PROTONIX) 40 MG tablet Take 1 tablet (40 mg total) by mouth once daily. 30 tablet 3    TYRVAYA 0.03 mg/spray sprm by Each Nostril route 2 (two) times a day.      vitamin D (VITAMIN D3) 1000 units Tab Take 1,000 Units by mouth once daily.      ergocalciferol (ERGOCALCIFEROL) 50,000 unit Cap Take 1 capsule (50,000 Units total) by mouth every 7 days. (Patient not taking: Reported on 8/27/2024) 12 capsule 0    fezolinetant 45 mg Tab Take 1 tablet by mouth once daily. (Patient not taking: Reported on 8/29/2024) 30 tablet 1    potassium chloride (K-TAB) 20 mEq Take 20 mEq by mouth once daily. (Patient not taking: Reported on 8/29/2024)      spironolactone (ALDACTONE) 25 MG tablet Take 1 tablet by mouth every morning. (Patient not taking: Reported on 8/29/2024)      [DISCONTINUED] benazepriL (LOTENSIN) 40 MG tablet Take 1 tablet (40 mg total) by mouth once daily. 90 tablet 1    [DISCONTINUED] fluticasone propionate (FLONASE) 50 mcg/actuation nasal spray 1 spray (50 mcg total) by Each Nostril route daily as needed for Rhinitis. 16 g 0    [DISCONTINUED] furosemide (LASIX) 40 MG tablet TAKE 1 TABLET BY MOUTH ONCE DAILY AS NEEDED (LEG  EDEMA) 90 tablet 0    [DISCONTINUED] JARDIANCE 10 mg tablet Take 1 tablet by mouth once  daily 90 tablet 0    [DISCONTINUED] spironolactone (ALDACTONE) 25 MG tablet Take 1 tablet (25 mg total) by mouth once daily. 30 tablet 11     No facility-administered encounter medications on file as of 8/29/2024.       Physical Exam:  Vitals:    08/29/24 0804   BP: 111/66   BP Location: Right arm   Patient Position: Sitting   BP Method: Large (Automatic)   Pulse: (!) 48   Temp: 97.7 °F (36.5 °C)   TempSrc: Oral       Physical Exam   General: NAD, voice normal  Neuro: AAO, CN II - XII grossly intact  Head/ Face: NCAT, symmetric, sensations intact bilaterally  Eyes: EOMI, PERRL  Ears: externally normal with grossly normal hearing  AD: EAC patent, TM intact, no middle ear effusion, no retractions  AS: EAC patent, TM intact, no middle ear effusion, no retractions  Nose: bilateral nares patent, midline septum, no rhinorrhea, no external deformity, +ITH  OC/OP: MMM, no intraoral lesions, no trismus, dentition is moderate, no uvular deviation, bilaterally symmetric soft palate elevation, palatoglossus and palatopharyngeal fold wnl; tonsils are symmetric and 1+  Indirect laryngoscopy: deferred due to patient intolerance  Neck: soft, supple, no LAD, normal ROM, no thyromegaly or palpable nodularity  Respiratory: nonlabored, no wheezing, bilateral chest rise  Cardiovascular: RRR  Gastrointestinal: S NT ND  Skin: warm, no lesions  Musculoskeletal: 5/5 strength  Psych: Appropriate affect/mood     Pertinent Data:  ? LABS:    ? AUDIO:           ? PATH:      Imaging:   I personally reviewed the following images:        Assessment/Plan:  74 y.o. female with dysphagia, hx of esophageal dilation last month. Also with dizziness/dysequilibrium. Reassurance provided that thyroid exam is unremarkable, TSH WNL. D/w Dr. Russo.  - D/c protonix  - Start aciphex daily  - MBSS + Esophagram  - Audiology referral for baseline + vestibular workup  - Vestibular exercises provided per pt request   - RTC 2-3mo    Carmencita Mandujano NP

## 2024-08-29 ENCOUNTER — OFFICE VISIT (OUTPATIENT)
Dept: OTOLARYNGOLOGY | Facility: CLINIC | Age: 74
End: 2024-08-29
Payer: MEDICARE

## 2024-08-29 VITALS — SYSTOLIC BLOOD PRESSURE: 111 MMHG | DIASTOLIC BLOOD PRESSURE: 66 MMHG | HEART RATE: 48 BPM | TEMPERATURE: 98 F

## 2024-08-29 DIAGNOSIS — R42 DIZZINESS: ICD-10-CM

## 2024-08-29 DIAGNOSIS — K21.9 GASTROESOPHAGEAL REFLUX DISEASE, UNSPECIFIED WHETHER ESOPHAGITIS PRESENT: ICD-10-CM

## 2024-08-29 DIAGNOSIS — R13.10 DYSPHAGIA, UNSPECIFIED TYPE: Primary | ICD-10-CM

## 2024-08-29 DIAGNOSIS — H91.90 HEARING DISORDER, UNSPECIFIED LATERALITY: ICD-10-CM

## 2024-08-29 DIAGNOSIS — R42 DYSEQUILIBRIUM: ICD-10-CM

## 2024-08-29 DIAGNOSIS — H93.19 TINNITUS, UNSPECIFIED LATERALITY: ICD-10-CM

## 2024-08-29 PROCEDURE — 1159F MED LIST DOCD IN RCRD: CPT | Mod: CPTII,,, | Performed by: NURSE PRACTITIONER

## 2024-08-29 PROCEDURE — 99204 OFFICE O/P NEW MOD 45 MIN: CPT | Mod: S$PBB,,, | Performed by: NURSE PRACTITIONER

## 2024-08-29 PROCEDURE — 3074F SYST BP LT 130 MM HG: CPT | Mod: CPTII,,, | Performed by: NURSE PRACTITIONER

## 2024-08-29 PROCEDURE — 1101F PT FALLS ASSESS-DOCD LE1/YR: CPT | Mod: CPTII,,, | Performed by: NURSE PRACTITIONER

## 2024-08-29 PROCEDURE — 3288F FALL RISK ASSESSMENT DOCD: CPT | Mod: CPTII,,, | Performed by: NURSE PRACTITIONER

## 2024-08-29 PROCEDURE — 99215 OFFICE O/P EST HI 40 MIN: CPT | Mod: PBBFAC | Performed by: NURSE PRACTITIONER

## 2024-08-29 PROCEDURE — 1126F AMNT PAIN NOTED NONE PRSNT: CPT | Mod: CPTII,,, | Performed by: NURSE PRACTITIONER

## 2024-08-29 PROCEDURE — 3078F DIAST BP <80 MM HG: CPT | Mod: CPTII,,, | Performed by: NURSE PRACTITIONER

## 2024-08-29 RX ORDER — RABEPRAZOLE SODIUM 20 MG/1
20 TABLET, DELAYED RELEASE ORAL DAILY
Qty: 30 TABLET | Refills: 11 | Status: SHIPPED | OUTPATIENT
Start: 2024-08-29 | End: 2025-08-29

## 2024-08-29 RX ORDER — POTASSIUM CHLORIDE 1500 MG/1
20 TABLET, EXTENDED RELEASE ORAL DAILY
COMMUNITY
Start: 2024-01-29

## 2024-08-29 RX ORDER — SPIRONOLACTONE 25 MG/1
1 TABLET ORAL EVERY MORNING
COMMUNITY
Start: 2023-11-27 | End: 2024-11-26

## 2024-09-03 ENCOUNTER — TELEPHONE (OUTPATIENT)
Dept: FAMILY MEDICINE | Facility: CLINIC | Age: 74
End: 2024-09-03
Payer: MEDICARE

## 2024-09-03 NOTE — TELEPHONE ENCOUNTER
I have reviewed your CMP lab results from 8/27/2024. All lab results are unremarkable. We have a follow up visit scheduled for 2/26/2025, but if anything arises before then please call the clinic to schedule an appointment sooner.    Garret Mcdonald MD  [facility]

## 2024-09-16 ENCOUNTER — DOCUMENTATION ONLY (OUTPATIENT)
Dept: AUDIOLOGY | Facility: HOSPITAL | Age: 74
End: 2024-09-16
Payer: MEDICARE

## 2024-09-16 NOTE — PROGRESS NOTES
Spoke with patient regarding VNG pre-test protocol and medication avoidance.  She reportedly is not currently taking any medications which may be adverse to testing.   A history of otologic procedures and significant opthalmic procedures have been denied at this time.  It was advised that only a light breakfast be consumed the morning of testing as well.  Patient is a good candidate for VNG testing at this time.

## 2024-10-05 DIAGNOSIS — I1A.0 RESISTANT HYPERTENSION: ICD-10-CM

## 2024-10-05 DIAGNOSIS — K59.09 CHRONIC CONSTIPATION: ICD-10-CM

## 2024-10-07 RX ORDER — AMLODIPINE BESYLATE 10 MG/1
10 TABLET ORAL
Qty: 90 TABLET | Refills: 0 | Status: SHIPPED | OUTPATIENT
Start: 2024-10-07

## 2024-10-07 RX ORDER — LINACLOTIDE 145 UG/1
145 CAPSULE, GELATIN COATED ORAL
Qty: 90 CAPSULE | Refills: 0 | Status: SHIPPED | OUTPATIENT
Start: 2024-10-07

## 2024-10-07 RX ORDER — POTASSIUM CHLORIDE 1500 MG/1
TABLET, EXTENDED RELEASE ORAL
Qty: 90 TABLET | Refills: 0 | Status: SHIPPED | OUTPATIENT
Start: 2024-10-07

## 2024-10-08 ENCOUNTER — CLINICAL SUPPORT (OUTPATIENT)
Dept: AUDIOLOGY | Facility: HOSPITAL | Age: 74
End: 2024-10-08
Payer: MEDICARE

## 2024-10-08 DIAGNOSIS — H81.4 VERTIGO OF CENTRAL ORIGIN, UNSPECIFIED LATERALITY: ICD-10-CM

## 2024-10-08 DIAGNOSIS — R42 DYSEQUILIBRIUM: ICD-10-CM

## 2024-10-08 DIAGNOSIS — H91.90 HEARING LOSS, UNSPECIFIED HEARING LOSS TYPE, UNSPECIFIED LATERALITY: Primary | ICD-10-CM

## 2024-10-08 DIAGNOSIS — H91.90 HEARING DISORDER, UNSPECIFIED LATERALITY: ICD-10-CM

## 2024-10-08 PROCEDURE — 92540 BASIC VESTIBULAR EVALUATION: CPT | Performed by: AUDIOLOGIST-HEARING AID FITTER

## 2024-10-08 PROCEDURE — 92537 CALORIC VSTBLR TEST W/REC: CPT | Performed by: AUDIOLOGIST-HEARING AID FITTER

## 2024-10-08 PROCEDURE — 92557 COMPREHENSIVE HEARING TEST: CPT | Performed by: AUDIOLOGIST-HEARING AID FITTER

## 2024-10-08 PROCEDURE — 92567 TYMPANOMETRY: CPT | Performed by: AUDIOLOGIST-HEARING AID FITTER

## 2024-10-08 NOTE — PROGRESS NOTES
Audiological/Vestibular Evaluation        Patient is a 74 year old female presenting with symptoms of chronic balance difficulties and light-headedness.  The onset of symptoms is reported to occur several years prior due to unknown etiology.  Mrs. Contreras has previously undergone vestibular testing in 2019 for similar symptomology.  She reports continuous and daily issues with maintaining her balance.  She also endorses light-headedness which may occur with lam changes in head movements.  No changes in hearing/tinnitus has been endorsed nor any triggers/warning signs related to her dizziness.  She denies any recent changes in medication as well as any new medical diagnosis at this time.     Pure Tone Testing:     Right ear:   Normal hearing sensitivity for the frequencies 250-8000 Hz    Left ear: Normal hearing sensitivity for the frequencies 250-8000 Hz    Tympanometry:      Right ear:   Type 'A' tympanogram     Left ear: Type 'A' tympanogram     DPOAE Testing:    Right ear: Present emissions 6101-4276 Hz    Left ear:  Present emissions 7681-5320 Hz        Vestibular Results:    Spontaneous: No spontaneous nystagmus observed for sitting position with vision denied.  Fixation ability was noted.   Gaze:   No gaze-evoked nystagmus.    Saccades:   Abnormal saccade velocity, latency and accuracy (Likely age-related abnormalities)   Tracking: Normal tracking accuracy and gain.  OPK:    No asymmetry noted.   Positionals: Vertical (up beat) nystagmus noted for static head right/head left position and body left positions with vision denied.   Positioning: No rotary nystagmus noted for DHP maneuver head right/left.   Calorics: Reduced caloric responses    Caloric vestibular responses: RC 2, RW 3, LW 4, LC 1 deg/s.        Interpretations:    Pure tone testing revealed normal hearing sensitivity for the frequencies 250-8000 Hz, bilaterally.  Today's findings consistent with previous testing performed in 2019 indicative  of stable hearing.  Speech reception thresholds were obtained at 15 dB HL, bilaterally, consistent with pure tone testing.  Word recognition scores were excellent, bilaterally. Tympanometry testing revealed Type A tympanograms, bilaterally, indicative of normal middle ear function.  DPOAE testing revealed present emissions for the frequencies noted indicative of normal cochlear physiology, bilaterally.  Otoscopy revealed clear EACs, bilaterally.     This patient's videonystagmogram was abnormal.  Oculomotor testing was within normal limits. Positional testing revealed vertical nystagmus for most conditions with vision denied. Positioning testing did not reveal any type of nystagmus and therefore, negative for canalith involvement.  The caloric irrigation test revealed symmetrical, yet reduced responses for air irrigations, bilaterally.  Todays findings indicative of possible central nervous system involvement. Today's findings consistent with previous vestibular testing performed in 2019, therefore no further recommendations for CNS abnormalities as patient had imaging performed in 2019.      Recommendations:        Return to referring ENT for further follow up   Consider balance retraining therapy under the guidance of PT to assess gait and prevent future falls      Rodney Huerta.  Clinical Audiologist

## 2024-10-15 DIAGNOSIS — N18.31 STAGE 3A CHRONIC KIDNEY DISEASE: ICD-10-CM

## 2024-10-15 DIAGNOSIS — I1A.0 RESISTANT HYPERTENSION: ICD-10-CM

## 2024-10-15 DIAGNOSIS — E11.9 TYPE 2 DIABETES MELLITUS WITHOUT COMPLICATION, WITHOUT LONG-TERM CURRENT USE OF INSULIN: ICD-10-CM

## 2024-10-15 NOTE — TELEPHONE ENCOUNTER
The patient would like mammo orders to be put into [facility] in [place], [place].

## 2024-10-17 ENCOUNTER — CLINICAL SUPPORT (OUTPATIENT)
Dept: NEPHROLOGY | Facility: CLINIC | Age: 74
End: 2024-10-17
Payer: MEDICARE

## 2024-10-17 ENCOUNTER — LAB VISIT (OUTPATIENT)
Dept: LAB | Facility: HOSPITAL | Age: 74
End: 2024-10-17
Attending: NURSE PRACTITIONER
Payer: MEDICARE

## 2024-10-17 ENCOUNTER — TELEPHONE (OUTPATIENT)
Dept: NEPHROLOGY | Facility: CLINIC | Age: 74
End: 2024-10-17

## 2024-10-17 VITALS — HEART RATE: 48 BPM | DIASTOLIC BLOOD PRESSURE: 66 MMHG | SYSTOLIC BLOOD PRESSURE: 134 MMHG

## 2024-10-17 DIAGNOSIS — N17.9 ACUTE KIDNEY INJURY: ICD-10-CM

## 2024-10-17 DIAGNOSIS — N17.9 ACUTE KIDNEY INJURY: Primary | ICD-10-CM

## 2024-10-17 LAB
ANION GAP SERPL CALC-SCNC: 6 MEQ/L
BUN SERPL-MCNC: 14.5 MG/DL (ref 9.8–20.1)
CALCIUM SERPL-MCNC: 10.6 MG/DL (ref 8.4–10.2)
CHLORIDE SERPL-SCNC: 109 MMOL/L (ref 98–107)
CO2 SERPL-SCNC: 28 MMOL/L (ref 23–31)
CREAT SERPL-MCNC: 1.23 MG/DL (ref 0.55–1.02)
CREAT/UREA NIT SERPL: 12
GFR SERPLBLD CREATININE-BSD FMLA CKD-EPI: 46 ML/MIN/1.73/M2
GLUCOSE SERPL-MCNC: 100 MG/DL (ref 82–115)
POTASSIUM SERPL-SCNC: 3.9 MMOL/L (ref 3.5–5.1)
SODIUM SERPL-SCNC: 143 MMOL/L (ref 136–145)

## 2024-10-17 PROCEDURE — 36415 COLL VENOUS BLD VENIPUNCTURE: CPT

## 2024-10-17 PROCEDURE — 99213 OFFICE O/P EST LOW 20 MIN: CPT | Mod: PBBFAC

## 2024-10-17 PROCEDURE — 80048 BASIC METABOLIC PNL TOTAL CA: CPT

## 2024-10-17 NOTE — TELEPHONE ENCOUNTER
Informed patient of lab results. Patient stated understanding.     ----- Message from BRENDAN Nagy sent at 10/17/2024 12:13 PM CDT -----  Please let the patient know that kidney function has improved. Thank you

## 2024-10-17 NOTE — PROGRESS NOTES
Presented to clinic for blood pressure check. Informed Ryan NP of results. Discharged from clinic, follow up as scheduled.

## 2024-10-18 RX ORDER — FUROSEMIDE 40 MG/1
TABLET ORAL
Qty: 90 TABLET | Refills: 0 | OUTPATIENT
Start: 2024-10-18

## 2024-10-18 RX ORDER — BENAZEPRIL HYDROCHLORIDE 40 MG/1
40 TABLET ORAL DAILY
Qty: 90 TABLET | Refills: 0 | Status: SHIPPED | OUTPATIENT
Start: 2024-10-18

## 2024-10-21 DIAGNOSIS — I1A.0 RESISTANT HYPERTENSION: ICD-10-CM

## 2024-10-21 RX ORDER — HYDRALAZINE HYDROCHLORIDE 100 MG/1
100 TABLET, FILM COATED ORAL EVERY 12 HOURS
Qty: 60 TABLET | Refills: 0 | Status: SHIPPED | OUTPATIENT
Start: 2024-10-21

## 2024-10-27 DIAGNOSIS — J30.2 SEASONAL ALLERGIES: ICD-10-CM

## 2024-10-29 ENCOUNTER — OFFICE VISIT (OUTPATIENT)
Dept: FAMILY MEDICINE | Facility: CLINIC | Age: 74
End: 2024-10-29
Payer: MEDICARE

## 2024-10-29 ENCOUNTER — HOSPITAL ENCOUNTER (OUTPATIENT)
Dept: RADIOLOGY | Facility: HOSPITAL | Age: 74
Discharge: HOME | End: 2024-10-29
Payer: MEDICARE

## 2024-10-29 ENCOUNTER — HOSPITAL ENCOUNTER (OUTPATIENT)
Dept: RADIOLOGY | Facility: HOSPITAL | Age: 74
Discharge: HOME | End: 2024-10-29
Attending: NURSE PRACTITIONER
Payer: MEDICARE

## 2024-10-29 ENCOUNTER — OFFICE VISIT (OUTPATIENT)
Dept: OTOLARYNGOLOGY | Facility: CLINIC | Age: 74
End: 2024-10-29
Payer: MEDICARE

## 2024-10-29 VITALS
DIASTOLIC BLOOD PRESSURE: 63 MMHG | SYSTOLIC BLOOD PRESSURE: 123 MMHG | TEMPERATURE: 98 F | BODY MASS INDEX: 27 KG/M2 | HEIGHT: 66 IN | WEIGHT: 168 LBS | RESPIRATION RATE: 18 BRPM | HEART RATE: 60 BPM | OXYGEN SATURATION: 99 %

## 2024-10-29 VITALS — HEART RATE: 60 BPM | SYSTOLIC BLOOD PRESSURE: 119 MMHG | DIASTOLIC BLOOD PRESSURE: 66 MMHG | TEMPERATURE: 98 F

## 2024-10-29 DIAGNOSIS — E78.5 HYPERLIPIDEMIA, UNSPECIFIED HYPERLIPIDEMIA TYPE: ICD-10-CM

## 2024-10-29 DIAGNOSIS — R13.10 DYSPHAGIA, UNSPECIFIED TYPE: ICD-10-CM

## 2024-10-29 DIAGNOSIS — I1A.0 RESISTANT HYPERTENSION: ICD-10-CM

## 2024-10-29 DIAGNOSIS — R25.2 BILATERAL LEG CRAMPS: ICD-10-CM

## 2024-10-29 DIAGNOSIS — H81.4 CENTRAL NERVOUS SYSTEM ORIGIN VERTIGO: Primary | ICD-10-CM

## 2024-10-29 DIAGNOSIS — N18.32 TYPE 2 DIABETES MELLITUS WITH STAGE 3B CHRONIC KIDNEY DISEASE, WITHOUT LONG-TERM CURRENT USE OF INSULIN: Primary | ICD-10-CM

## 2024-10-29 DIAGNOSIS — E11.22 TYPE 2 DIABETES MELLITUS WITH STAGE 3B CHRONIC KIDNEY DISEASE, WITHOUT LONG-TERM CURRENT USE OF INSULIN: Primary | ICD-10-CM

## 2024-10-29 DIAGNOSIS — R42 DIZZINESS: ICD-10-CM

## 2024-10-29 DIAGNOSIS — K21.9 GASTROESOPHAGEAL REFLUX DISEASE, UNSPECIFIED WHETHER ESOPHAGITIS PRESENT: ICD-10-CM

## 2024-10-29 DIAGNOSIS — I73.9 CLAUDICATION OF BOTH LOWER EXTREMITIES: ICD-10-CM

## 2024-10-29 DIAGNOSIS — J30.2 SEASONAL ALLERGIES: ICD-10-CM

## 2024-10-29 LAB
LEFT ABI: 2.17
LEFT ARM BP: 133 MMHG
LEFT CFA PSV: 97 CM/S
LEFT DORSALIS PEDIS PSV: 58 CM/S
LEFT DORSALIS PEDIS: 291 MMHG
LEFT POPLITEAL PSV: 151 CM/S
LEFT POST TIBIAL SYS PSV: 27 CM/S
LEFT POSTERIOR TIBIAL: 204 MMHG
LEFT PROFUNDA SYS PSV: 83 CM/S
LEFT SUPER FEMORAL DIST SYS PSV: 112 CM/S
LEFT SUPER FEMORAL MID SYS PSV: 106 CM/S
LEFT SUPER FEMORAL PROX SYS PSV: 84 CM/S
OHS CV LEFT LOWER EXTREMITY ABI (NO CALC): 2.17
OHS CV RIGHT ABI LOWER EXTREMITY (NO CALC): 2.17
RIGHT ABI: 2.17
RIGHT ARM BP: 134 MMHG
RIGHT CFA PSV: 71 CM/S
RIGHT DORSALIS PEDIS PSV: 55 CM/S
RIGHT DORSALIS PEDIS: 291 MMHG
RIGHT POPLITEAL PSV: 56 CM/S
RIGHT POST TIBIAL SYS PSV: 40 CM/S
RIGHT POSTERIOR TIBIAL: 286 MMHG
RIGHT PROFUNDA SYS PSV: 99 CM/S
RIGHT SUPER FEMORAL DIST SYS PSV: 158 CM/S
RIGHT SUPER FEMORAL MID SYS PSV: 92 CM/S
RIGHT SUPER FEMORAL PROX SYS PSV: 78 CM/S

## 2024-10-29 PROCEDURE — 99214 OFFICE O/P EST MOD 30 MIN: CPT | Mod: S$PBB,,, | Performed by: FAMILY MEDICINE

## 2024-10-29 PROCEDURE — 3288F FALL RISK ASSESSMENT DOCD: CPT | Mod: CPTII,,, | Performed by: NURSE PRACTITIONER

## 2024-10-29 PROCEDURE — 99214 OFFICE O/P EST MOD 30 MIN: CPT | Mod: PBBFAC | Performed by: NURSE PRACTITIONER

## 2024-10-29 PROCEDURE — 1126F AMNT PAIN NOTED NONE PRSNT: CPT | Mod: CPTII,,, | Performed by: NURSE PRACTITIONER

## 2024-10-29 PROCEDURE — 3078F DIAST BP <80 MM HG: CPT | Mod: CPTII,,, | Performed by: FAMILY MEDICINE

## 2024-10-29 PROCEDURE — 3078F DIAST BP <80 MM HG: CPT | Mod: CPTII,,, | Performed by: NURSE PRACTITIONER

## 2024-10-29 PROCEDURE — 93922 UPR/L XTREMITY ART 2 LEVELS: CPT

## 2024-10-29 PROCEDURE — 3074F SYST BP LT 130 MM HG: CPT | Mod: CPTII,,, | Performed by: NURSE PRACTITIONER

## 2024-10-29 PROCEDURE — 74220 X-RAY XM ESOPHAGUS 1CNTRST: CPT | Mod: TC

## 2024-10-29 PROCEDURE — 1159F MED LIST DOCD IN RCRD: CPT | Mod: CPTII,,, | Performed by: NURSE PRACTITIONER

## 2024-10-29 PROCEDURE — 99213 OFFICE O/P EST LOW 20 MIN: CPT | Mod: S$PBB,,, | Performed by: NURSE PRACTITIONER

## 2024-10-29 PROCEDURE — 99214 OFFICE O/P EST MOD 30 MIN: CPT | Mod: PBBFAC,25,27 | Performed by: FAMILY MEDICINE

## 2024-10-29 PROCEDURE — 3288F FALL RISK ASSESSMENT DOCD: CPT | Mod: CPTII,,, | Performed by: FAMILY MEDICINE

## 2024-10-29 PROCEDURE — 3074F SYST BP LT 130 MM HG: CPT | Mod: CPTII,,, | Performed by: FAMILY MEDICINE

## 2024-10-29 PROCEDURE — 93925 LOWER EXTREMITY STUDY: CPT

## 2024-10-29 PROCEDURE — 1101F PT FALLS ASSESS-DOCD LE1/YR: CPT | Mod: CPTII,,, | Performed by: NURSE PRACTITIONER

## 2024-10-29 PROCEDURE — 1101F PT FALLS ASSESS-DOCD LE1/YR: CPT | Mod: CPTII,,, | Performed by: FAMILY MEDICINE

## 2024-10-29 RX ORDER — FLUTICASONE PROPIONATE 50 MCG
SPRAY, SUSPENSION (ML) NASAL
Qty: 16 G | Refills: 0 | OUTPATIENT
Start: 2024-10-29

## 2024-10-29 RX ORDER — HYDRALAZINE HYDROCHLORIDE 100 MG/1
100 TABLET, FILM COATED ORAL EVERY 12 HOURS
Qty: 180 TABLET | Refills: 1 | Status: SHIPPED | OUTPATIENT
Start: 2024-10-29

## 2024-10-29 RX ORDER — BENAZEPRIL HYDROCHLORIDE 40 MG/1
40 TABLET ORAL DAILY
Qty: 90 TABLET | Refills: 1 | Status: SHIPPED | OUTPATIENT
Start: 2024-10-29

## 2024-10-29 RX ORDER — FLUTICASONE PROPIONATE 50 MCG
1 SPRAY, SUSPENSION (ML) NASAL DAILY
Qty: 16 G | Refills: 3 | Status: SHIPPED | OUTPATIENT
Start: 2024-10-29

## 2024-10-29 RX ORDER — ATORVASTATIN CALCIUM 20 MG/1
20 TABLET, FILM COATED ORAL NIGHTLY
Qty: 90 TABLET | Refills: 1 | Status: SHIPPED | OUTPATIENT
Start: 2024-10-29

## 2024-10-29 RX ORDER — FUROSEMIDE 40 MG/1
TABLET ORAL
Qty: 90 TABLET | Refills: 0 | Status: CANCELLED | OUTPATIENT
Start: 2024-10-29

## 2024-10-29 RX ORDER — PANTOPRAZOLE SODIUM 40 MG/1
40 TABLET, DELAYED RELEASE ORAL EVERY MORNING
COMMUNITY

## 2024-10-29 RX ORDER — AMLODIPINE BESYLATE 10 MG/1
10 TABLET ORAL DAILY
Qty: 90 TABLET | Refills: 1 | Status: SHIPPED | OUTPATIENT
Start: 2024-10-29

## 2024-10-30 ENCOUNTER — TELEPHONE (OUTPATIENT)
Dept: CARDIOLOGY | Facility: CLINIC | Age: 74
End: 2024-10-30
Payer: MEDICARE

## 2024-10-30 DIAGNOSIS — R68.89 ABNORMAL ANKLE BRACHIAL INDEX (ABI): ICD-10-CM

## 2024-10-30 DIAGNOSIS — I73.9 CLAUDICATION OF BOTH LOWER EXTREMITIES: ICD-10-CM

## 2024-10-30 DIAGNOSIS — R25.2 BILATERAL LEG CRAMPS: Primary | ICD-10-CM

## 2024-11-01 PROBLEM — E11.22 TYPE 2 DIABETES MELLITUS WITH STAGE 3B CHRONIC KIDNEY DISEASE, WITHOUT LONG-TERM CURRENT USE OF INSULIN: Status: ACTIVE | Noted: 2024-11-01

## 2024-11-01 PROBLEM — N18.32 TYPE 2 DIABETES MELLITUS WITH STAGE 3B CHRONIC KIDNEY DISEASE, WITHOUT LONG-TERM CURRENT USE OF INSULIN: Status: ACTIVE | Noted: 2024-11-01

## 2024-11-08 ENCOUNTER — HOSPITAL ENCOUNTER (OUTPATIENT)
Dept: RADIOLOGY | Facility: HOSPITAL | Age: 74
Discharge: HOME | End: 2024-11-08
Attending: NURSE PRACTITIONER
Payer: MEDICARE

## 2024-11-08 ENCOUNTER — CLINICAL SUPPORT (OUTPATIENT)
Dept: REHABILITATION | Facility: HOSPITAL | Age: 74
End: 2024-11-08
Attending: NURSE PRACTITIONER
Payer: MEDICARE

## 2024-11-08 DIAGNOSIS — K21.9 GASTROESOPHAGEAL REFLUX DISEASE, UNSPECIFIED WHETHER ESOPHAGITIS PRESENT: ICD-10-CM

## 2024-11-08 DIAGNOSIS — R13.10 DYSPHAGIA, UNSPECIFIED TYPE: ICD-10-CM

## 2024-11-08 PROCEDURE — 92611 MOTION FLUOROSCOPY/SWALLOW: CPT

## 2024-11-08 PROCEDURE — 74230 X-RAY XM SWLNG FUNCJ C+: CPT | Mod: TC

## 2024-11-08 NOTE — PLAN OF CARE
[facility]  MODIFIED BARIUM SWALLOW STUDY  Outpatient    Date: 11/08/2024      Name: Neris Contreras   MRN: [id number]    Therapy Diagnosis: WFL oropharyngeal swallow    Physician: Carmencita Mandujano FNP  Physician Orders: SLP Video Swallow  Medical Diagnosis from Referral:     Date of Evaluation:  11/08/2024    Time In:  0830  Time Out:  0900  Total Billable Time: 30     Procedure Min.   Fl Modified Barium Swallow Speech  30     Precautions: Aspiration    Recommendations:   Consistency Recommendations:  Thin liquids (IDDSI 0) and Regular consistencies (IDDSI 7).  Medications should be taken Whole in thin liquids.   Precautions:frequent oral care  Risk Management: use good oral hygiene , sit upright for all PO intake, and increase physical mobility as tolerated  Specialist Referrals: Defer back to GI and ENT  Ancillary Tests:   Therapy: Dysphagia therapy is not recommended at this time.  Frequency:   Follow-up exam: Follow up swallow study is not indicated at this time.    Subjective       Past Medical History: Neris Contreras  has a past surgical history that includes Leg Surgery (Right); Eye surgery; Cardiac surgery; Cardiac catheterization; Open reduction and internal fixation (ORIF) of injury of elbow; and Repair of eyelid (Left, 5/13/2022).       Active Ambulatory Problems     Diagnosis Date Noted    Resistant hypertension 02/28/2023    Bradycardia 02/28/2023    Chronic kidney disease 02/28/2023    Prediabetes 02/28/2023    Pulmonary hypertension 03/09/2023    Bilateral lower extremity edema 08/31/2023    Shortness of breath 08/31/2023    Chronic constipation 11/03/2023    Hot flashes due to menopause 11/03/2023    Chronic midline thoracic back pain 11/03/2023    Hyperlipidemia 11/03/2023    PAD (peripheral artery disease) 11/03/2023    Toe pain, bilateral 11/28/2023    Dysphagia 02/01/2024    Screening for colon cancer 02/01/2024    Bilateral leg cramps 08/22/2024    Claudication of both lower  extremities 08/22/2024    Type 2 diabetes mellitus with stage 3b chronic kidney disease, without long-term current use of insulin 11/01/2024     Resolved Ambulatory Problems     Diagnosis Date Noted    TIA (transient ischemic attack) 03/09/2023     Past Medical History:   Diagnosis Date    CVA (cerebral vascular accident)     Depression     Diabetes mellitus     HLD (hyperlipidemia)     Hypertension     Pulmonary HTN     Sleep apnea       Medical Hx and Allergies:    Review of patient's allergies indicates:   Allergen Reactions    Iodine Hives     topical    Iodine and iodide containing products     Shellfish containing products      Other reaction(s): whelps/ itching       Modified barium swallow study (MBSS) completed to assess swallow effectiveness, ID/rule out penetration and aspiration, make appropriate recommendations regarding safest diet consistency, effective compensatory strategies and safe eating environment.     The patient is a 74 y.o. female who complains of  globus sensation .     The patient gave the following history:   -Current diet at home: regular/thin      The following observations were made:   -Mental status: Alert and Cooperative  -Factors affecting performance: no difficulties participating in the study  -Feeding Method: independent in self-feeding    Respiratory Status:   -Respiratory Status: room air      Pain Scale:  0/10 on VAS currently.   Pain Location: no pain reported    Objective   Cranial Nerve Examination  Cranial Nerve 5: Trigeminal Nerve  Motor Jaw Posture at rest: Closed  Mandible Elevation/Depression: WFL  Mandible lateralization: WFL  Abnormal movement: absent Interpretation:   intact   Sensory Forehead: WFL  Cheek: WFL  Jaw: WFL  Facial Pain: None noted Interpretation:   intact     Cranial Nerve 7: Facial Nerve  Motor Facial Symmetry: WNL  Wrinkle Forehead: WFL  Close eyes tightly: WFL  Labial Protrusion: WFL  Labial Retraction: WFL  Abnormal movement: absent Interpretation:    intact   Sensory Formal testing not completed. Patient denied any changes in taste      Cranial Nerves IX and X: Glossopharyngeal and Vagus Nerves  Motor Palatal Symmetry (Rest): WNL  Palatal Symmetry (Movement): WNL  Cough: Perceptually strong  Voice Prior to PO intake: Clear  Resonance: Normal  Abnormal movement: absent Interpretation:   intact     Cranial Nerve XII: Hypoglossal Nerve  Motor Tongue at rest: WNL  Lingual Protrusion: WNL  Lingual Protrusion against Resistance: WNL  Lingual Lateralization: WNL  Abnormal movement: absent Interpretation:   intact     Other information:  Volitional Swallow: Able to palpate laryngeal rise  Mucosal Quality: No abnormal findings  Secretion Management: Secretion Mgmt: adequate  Dentition: Good condition for speech and mastication     CONSISTENCIES ADMINISTERED:  Thin Liquids (IDDSI 0):  Mode: Independent   Oral Residue: none    Vallecular Residue: none    Pyriform Sinus Residue: none    Rosenbeck's 8-Point Penetration-Aspiration Scale:  Best: (1) Material does not enter the airway  penetration or aspiration did not occur during the swallow with thin liquids via straw  Worst: (2) Material enters the airway, remains above the vocal folds, and is ejected from the airway  penetration did occur during the swallow with thin liquids via straw and consecutive sips  Strategies attempted:   Barium Tablet: The barium tablet was administered in thin liquids due to  .    Mildly Thick Liquids (IDDSI 2):  DNT    Puree (IDDSI 4):  Mode: Independent  Oral Residue: none  Vallecular Residue: none   Pyriform Sinus Residue: none   Rosenbeck's 8-Point Penetration-Aspiration Scale:   Best: (1) Material does not enter the airway  penetration or aspiration did not occur during the swallow.    Strategies attempted:   Barium Tablet:     Mixed (Soft and Bite Sized) Consistency Bolus (IDDSI 6 with IDDSI 2):  Mode: Independent  Oral Residue: none   Vallecular Residue: none   Pyriform Sinus Residue:  none   Rosenbeck's 8-Point Penetration-Aspiration Scale:   Best: (1) Material does not enter the airway  penetration or aspiration did not occur during the swallow.    Strategies attempted:     Regular (IDDSI 7):  Mode: Independent  Oral Residue: none   Vallecular Residue: none   Pyriform Sinus Residue: none   Rosenbeck's 8-Point Penetration-Aspiration Scale:   Best: (1) Material does not enter the airway  penetration or aspiration did not occur during the swallow.    Strategies attempted:     Impression   Patient's oropharyngeal swallow appear within functional limits for the  consistencies assessed. No penetration or aspiration appreciated during this study. Based on this assessment, patient appears safe to continue a PO diet with general swallowing precautions. No further skilled ST services indicated at this time.      Patient reports globus sensation prior to the start of this study and continues to sense that feeling after the study. Will defer back to GI and ENT for further assessment.     Assessment     Preparatory Phase:     Patient Positioning:   Upright in radiology chair      Level of Assistance:   Independent      Regulation of Bolus:   Independent    Oral Phase:     Labial Seal:   no labial escape      Lingual Movement:   Lingual motion was brisk for adequate bolus transport.    Bolus Acceptance:   WFL     Oral Manipulation:   WFL     Mastication:   timely and efficient    Oral Transit:   WFL     Oral Pocketing:   No    Oral Residue:   There was no significant oral residue.    Pharyngeal Phase:     Tongue Base Retraction:   No contrast between the base of tongue and posterior pharyngeal wall with consistencies presented         Initiation of Pharyngeal Swallow:   Occurs when the bolus head is at the ramus of the mandible with consistencies presented      Epiglottic Closure:  Complete inversion      Laryngeal Elevation:   Complete elevation      Anterior Hyoid Excursion:   Complete excursion       Pharyngeal Contractions (A-P view only):   Adequate     Pharyngeal Stripping Wave:   Present       Vallecular Residue:  None observed with consistencies presented      Posterior Pharyngeal Wall Residue:   None observed with consistencies presented      Pyriform Sinus Residue:   None observed with consistencies presented       Esophageal Phase:   On esophageal screen, UES appeared to accommodate all bolus types without stasis or retrograde movement observed          Goals:           The Functional Oral Intake Scale (FOIS) is an ordinal scale that is used to assess the current status and meaningful change in the oral intake. FOIS levels include:        TUBE DEPENDENT   (Levels 1-3) 1. No oral intake    2. Tube dependent with minimal/inconsistent oral intake    3. Tube supplements with consistent oral intake          TOTAL ORAL INTAKE (Levels 4-7) 4. Total oral intake of a single consistency    5. Total oral intake of multiple consistencies requiring special preparation    6. Total oral intake with no special preparation, but must avoid specific foods or liquid items    7. Total oral intake with no restrictions   Patient is currently judged to be at FOIS Level 7        *MICHELLE Ramirez (2005, March). Pneumonia: Factors Beyond Aspiration. Perspectives in Swallowing and Swallowing Disorders (Dysphagia), 14, 10-16.  Education   Education: Aspiration precautions and Recommendations were discussed with the patient. Patient expressed understanding.       Barriers to Learning:     Teaching Method: Verbal        Edward Pastor M.S. CCC-SLP  [facility]

## 2024-11-22 RX ORDER — FUROSEMIDE 40 MG/1
TABLET ORAL
Qty: 90 TABLET | Refills: 0 | Status: SHIPPED | OUTPATIENT
Start: 2024-11-22

## 2024-12-04 ENCOUNTER — TELEPHONE (OUTPATIENT)
Dept: FAMILY MEDICINE | Facility: CLINIC | Age: 74
End: 2024-12-04
Payer: MEDICARE

## 2024-12-04 DIAGNOSIS — Z12.31 ENCOUNTER FOR SCREENING MAMMOGRAM FOR MALIGNANT NEOPLASM OF BREAST: Primary | ICD-10-CM

## 2024-12-04 NOTE — TELEPHONE ENCOUNTER
The patient asked if a mammo referral be placed to [facility] in [facility]

## 2024-12-31 DIAGNOSIS — K59.09 CHRONIC CONSTIPATION: ICD-10-CM

## 2024-12-31 RX ORDER — LINACLOTIDE 145 UG/1
145 CAPSULE, GELATIN COATED ORAL
Qty: 90 CAPSULE | Refills: 2 | Status: SHIPPED | OUTPATIENT
Start: 2024-12-31

## 2025-01-06 ENCOUNTER — LAB VISIT (OUTPATIENT)
Dept: LAB | Facility: HOSPITAL | Age: 75
End: 2025-01-06
Attending: NURSE PRACTITIONER
Payer: MEDICARE

## 2025-01-06 ENCOUNTER — OFFICE VISIT (OUTPATIENT)
Dept: NEPHROLOGY | Facility: CLINIC | Age: 75
End: 2025-01-06
Payer: MEDICARE

## 2025-01-06 VITALS
DIASTOLIC BLOOD PRESSURE: 68 MMHG | OXYGEN SATURATION: 100 % | RESPIRATION RATE: 18 BRPM | WEIGHT: 174.63 LBS | TEMPERATURE: 98 F | HEIGHT: 66 IN | HEART RATE: 62 BPM | SYSTOLIC BLOOD PRESSURE: 138 MMHG | BODY MASS INDEX: 28.06 KG/M2

## 2025-01-06 DIAGNOSIS — I50.22 CHRONIC SYSTOLIC HEART FAILURE: ICD-10-CM

## 2025-01-06 DIAGNOSIS — I10 PRIMARY HYPERTENSION: ICD-10-CM

## 2025-01-06 DIAGNOSIS — E83.52 HYPERCALCEMIA: ICD-10-CM

## 2025-01-06 DIAGNOSIS — N18.31 CKD STAGE G3A/A1, GFR 45-59 AND ALBUMIN CREATININE RATIO <30 MG/G: Primary | ICD-10-CM

## 2025-01-06 DIAGNOSIS — N18.32 CKD STAGE G3B/A1, GFR 30-44 AND ALBUMIN CREATININE RATIO <30 MG/G: ICD-10-CM

## 2025-01-06 LAB
ALBUMIN SERPL-MCNC: 3.8 G/DL (ref 3.4–4.8)
ALBUMIN/GLOB SERPL: 1 RATIO (ref 1.1–2)
ALP SERPL-CCNC: 75 UNIT/L (ref 40–150)
ALT SERPL-CCNC: 12 UNIT/L (ref 0–55)
ANION GAP SERPL CALC-SCNC: 8 MEQ/L
AST SERPL-CCNC: 15 UNIT/L (ref 5–34)
BACTERIA #/AREA URNS AUTO: ABNORMAL /HPF
BILIRUB SERPL-MCNC: 0.6 MG/DL
BILIRUB UR QL STRIP.AUTO: NEGATIVE
BUN SERPL-MCNC: 19.4 MG/DL (ref 9.8–20.1)
CALCIUM SERPL-MCNC: 10 MG/DL (ref 8.4–10.2)
CHLORIDE SERPL-SCNC: 106 MMOL/L (ref 98–107)
CLARITY UR: CLEAR
CO2 SERPL-SCNC: 27 MMOL/L (ref 23–31)
COLOR UR AUTO: ABNORMAL
CREAT SERPL-MCNC: 1.25 MG/DL (ref 0.55–1.02)
CREAT UR-MCNC: 156.3 MG/DL (ref 45–106)
CREAT/UREA NIT SERPL: 16
GFR SERPLBLD CREATININE-BSD FMLA CKD-EPI: 45 ML/MIN/1.73/M2
GLOBULIN SER-MCNC: 3.8 GM/DL (ref 2.4–3.5)
GLUCOSE SERPL-MCNC: 108 MG/DL (ref 82–115)
GLUCOSE UR QL STRIP: ABNORMAL
HGB UR QL STRIP: NEGATIVE
HYALINE CASTS #/AREA URNS LPF: ABNORMAL /LPF
KETONES UR QL STRIP: NEGATIVE
LEUKOCYTE ESTERASE UR QL STRIP: NEGATIVE
MUCOUS THREADS URNS QL MICRO: ABNORMAL /LPF
NITRITE UR QL STRIP: NEGATIVE
PH UR STRIP: 5 [PH]
POTASSIUM SERPL-SCNC: 4.1 MMOL/L (ref 3.5–5.1)
PROT SERPL-MCNC: 7.6 GM/DL (ref 5.8–7.6)
PROT UR QL STRIP: NEGATIVE
PROT UR STRIP-MCNC: 8.3 MG/DL
RBC #/AREA URNS AUTO: ABNORMAL /HPF
SODIUM SERPL-SCNC: 141 MMOL/L (ref 136–145)
SP GR UR STRIP.AUTO: 1.02 (ref 1–1.03)
SQUAMOUS #/AREA URNS LPF: ABNORMAL /HPF
URINE PROTEIN/CREATININE RATIO (OLG): 0.1
UROBILINOGEN UR STRIP-ACNC: NORMAL
WBC #/AREA URNS AUTO: ABNORMAL /HPF

## 2025-01-06 PROCEDURE — 99215 OFFICE O/P EST HI 40 MIN: CPT | Mod: PBBFAC | Performed by: NURSE PRACTITIONER

## 2025-01-06 PROCEDURE — 80053 COMPREHEN METABOLIC PANEL: CPT

## 2025-01-06 PROCEDURE — 1101F PT FALLS ASSESS-DOCD LE1/YR: CPT | Mod: CPTII,,, | Performed by: NURSE PRACTITIONER

## 2025-01-06 PROCEDURE — 1126F AMNT PAIN NOTED NONE PRSNT: CPT | Mod: CPTII,,, | Performed by: NURSE PRACTITIONER

## 2025-01-06 PROCEDURE — 3078F DIAST BP <80 MM HG: CPT | Mod: CPTII,,, | Performed by: NURSE PRACTITIONER

## 2025-01-06 PROCEDURE — 36415 COLL VENOUS BLD VENIPUNCTURE: CPT

## 2025-01-06 PROCEDURE — 99214 OFFICE O/P EST MOD 30 MIN: CPT | Mod: S$PBB,,, | Performed by: NURSE PRACTITIONER

## 2025-01-06 PROCEDURE — 1159F MED LIST DOCD IN RCRD: CPT | Mod: CPTII,,, | Performed by: NURSE PRACTITIONER

## 2025-01-06 PROCEDURE — 3288F FALL RISK ASSESSMENT DOCD: CPT | Mod: CPTII,,, | Performed by: NURSE PRACTITIONER

## 2025-01-06 PROCEDURE — 81001 URINALYSIS AUTO W/SCOPE: CPT

## 2025-01-06 PROCEDURE — 82570 ASSAY OF URINE CREATININE: CPT

## 2025-01-06 PROCEDURE — 3075F SYST BP GE 130 - 139MM HG: CPT | Mod: CPTII,,, | Performed by: NURSE PRACTITIONER

## 2025-01-06 NOTE — PROGRESS NOTES
"[facility]  Nephrology Clinic Note    Chief complaint: Follow-up (RTC, took meds, 1-2+ edema)    History of present illness:   Neris Contreras is a 74 y.o. Black or  female with past medical history of  hypertension, chronic kidney, chronic constipation, dysphagia, dyslipidemia, peripheral vascular disease, diabetes mellitus type 2, CARISSA, pulmonary hypertension, history of CVA, heart failure with preserved ejection fraction (followed by [facility] cardiology clinic) and obesity. Presents for follow up appointment in nephrology clinic.    Review of Systems  12 point review of systems conducted, negative except as stated in the history of present illness.    Allergies: Patient is allergic to iodine, iodine and iodide containing products, and shellfish containing products.     Past Medical History:  has a past medical history of CVA (cerebral vascular accident), Depression, Diabetes mellitus, HLD (hyperlipidemia), Hypertension, Pulmonary HTN, and Sleep apnea.    Procedure History:  has a past surgical history that includes Leg Surgery (Right); Eye surgery; Cardiac surgery; Cardiac catheterization; Open reduction and internal fixation (ORIF) of injury of elbow; and Repair of eyelid (Left, 5/13/2022).    Family History: family history includes Diabetes in her mother; Kidney disease in her sister and son.    Social History:  reports that she has never smoked. She has never used smokeless tobacco. She reports that she does not currently use alcohol. She reports that she does not use drugs.    Physical exam  /68 (BP Location: Right arm, Patient Position: Sitting)   Pulse 62   Temp 98 °F (36.7 °C) (Oral)   Resp 18   Ht 5' 5.75" (1.67 m)   Wt 79.2 kg (174 lb 9.7 oz)   SpO2 100%   BMI 28.40 kg/m²   General appearance: Patient is in no acute distress.  Skin: No rashes or wounds.  HEENT: PERRLA, EOMI, no scleral icterus, no JVD. Neck is supple.  Chest: Respirations are unlabored. " Lungs sounds are clear.   Heart: S1, S2.   Abdomen: Benign.  : Deferred.  Extremities: No edema, peripheral pulses are palpable.   Neuro: No focal deficits.     Home Medications:    Current Outpatient Medications:     albuterol (PROVENTIL/VENTOLIN HFA) 90 mcg/actuation inhaler, Inhale 2 puffs into the lungs every 4 (four) hours., Disp: , Rfl:     amLODIPine (NORVASC) 10 MG tablet, Take 1 tablet (10 mg total) by mouth once daily., Disp: 90 tablet, Rfl: 1    atorvastatin (LIPITOR) 20 MG tablet, Take 1 tablet (20 mg total) by mouth every evening., Disp: 90 tablet, Rfl: 1    benazepriL (LOTENSIN) 40 MG tablet, Take 1 tablet (40 mg total) by mouth once daily., Disp: 90 tablet, Rfl: 1    clopidogreL (PLAVIX) 75 mg tablet, Take 1 tablet (75 mg total) by mouth once daily., Disp: 30 tablet, Rfl: 11    empagliflozin (JARDIANCE) 10 mg tablet, Take 1 tablet (10 mg total) by mouth once daily., Disp: 90 tablet, Rfl: 1    fluticasone propionate (FLONASE) 50 mcg/actuation nasal spray, 1 spray (50 mcg total) by Each Nostril route Daily., Disp: 16 g, Rfl: 3    furosemide (LASIX) 40 MG tablet, TAKE 1 TABLET BY MOUTH ONCE DAILY AS NEEDED (LEG  EDEMA), Disp: 90 tablet, Rfl: 0    hydrALAZINE (APRESOLINE) 100 MG tablet, Take 1 tablet (100 mg total) by mouth every 12 (twelve) hours., Disp: 180 tablet, Rfl: 1    linaCLOtide (LINZESS) 145 mcg Cap capsule, TAKE 1 CAPSULE BY MOUTH BEFORE BREAKFAST, Disp: 90 capsule, Rfl: 2    pantoprazole (PROTONIX) 40 MG tablet, Take 40 mg by mouth every morning., Disp: , Rfl:     TYRVAYA 0.03 mg/spray sprm, by Each Nostril route 2 (two) times a day., Disp: , Rfl:     ergocalciferol (ERGOCALCIFEROL) 50,000 unit Cap, Take 1 capsule (50,000 Units total) by mouth every 7 days., Disp: 12 capsule, Rfl: 0    fezolinetant 45 mg Tab, Take 1 tablet by mouth once daily. (Patient not taking: Reported on 1/6/2025), Disp: 30 tablet, Rfl: 1    potassium chloride (K-TAB) 20 mEq, Take one capsule the day when taking lasix,  Disp: 90 tablet, Rfl: 0    RABEprazole (ACIPHEX) 20 mg tablet, Take 1 tablet (20 mg total) by mouth once daily. (Patient not taking: Reported on 1/6/2025), Disp: 30 tablet, Rfl: 11    vitamin D (VITAMIN D3) 1000 units Tab, Take 1,000 Units by mouth once daily. (Patient not taking: Reported on 1/6/2025), Disp: , Rfl:     Laboratory data    Lab Results   Component Value Date    WBC 6.05 08/22/2024    HGB 11.5 (L) 08/22/2024    HCT 38.0 08/22/2024     08/22/2024    FOLATE 9.4 10/19/2020    CUZIKRBM02 733 12/09/2020     (H) 08/03/2020     01/06/2025    K 4.1 01/06/2025    CO2 27 01/06/2025    BUN 19.4 01/06/2025    CREATININE 1.25 (H) 01/06/2025    EGFRNORACEVR 45 01/06/2025    GLUCOSE 108 01/06/2025    CALCIUM 10.0 01/06/2025    ALKPHOS 75 01/06/2025    LABPROT 7.6 01/06/2025    ALBUMIN 3.8 01/06/2025    BILIDIR 0.3 04/28/2022    IBILI 0.40 04/28/2022    AST 15 01/06/2025    ALT 12 01/06/2025    MG 1.80 03/06/2023    PHOS 3.6 08/22/2024      Lab Results   Component Value Date    HGBA1C 5.2 08/22/2024    .7 (H) 08/22/2024    WNQAJFOJ41NL 32 08/22/2024    HEPCAB Nonreactive 04/11/2023     Urine:  Lab Results   Component Value Date    APPEARANCEUA Clear 08/22/2024    SGUA 1.017 08/22/2024    PROTEINUA Trace (A) 08/22/2024    KETONESUA Negative 08/22/2024    LEUKOCYTESUR Negative 08/22/2024    RBCUA 0-5 08/22/2024    WBCUA 0-5 08/22/2024    BACTERIA None Seen 08/22/2024    SQEPUA Trace (A) 08/22/2024    HYALINECASTS 11-20 (A) 08/22/2024    CREATRANDUR 236.9 (H) 08/22/2024    PROTEINURINE 15.7 08/22/2024    UPROTCREA 0.1 08/22/2024         Imaging  US Retroperitoneal Limited 11/28/2023  The kidneys are symmetric and normal in size and position with the right kidney measuring 8.8 cm in length and the left kidney 10.1 cm in length.  There is no abnormal renal cortical echogenicity or cortical thinning.  A single simple right interpolar renal cyst measures 3.2 x 3 x 2.7 cm.  A single simple left  renal cyst measures 4 x 3.9 x 3.7 cm.  No nephrolithiasis is identified.  There is no hydronephrosis.  Normal color Doppler vascular flow is demonstrated at both kidneys.  There is no perirenal free fluid or mass.  The proximal IVC is widely patent and normal.  The bladder was not imaged.    Impression  Single simple bilateral renal cysts with no additional renal pathology identified.  Electronically signed by: Nazario Lyons  Date:    11/28/2023  Time:    08:38    Echo 9/27/23    Left Ventricle: The left ventricle is normal in size. Mildly increased wall thickness. There is low normal systolic function with a visually estimated ejection fraction of 50 - 55%.    Left Atrium: Left atrium is mildly dilated.    Right Ventricle: Normal right ventricular cavity size. Systolic function is normal.    Mitral Valve: There is mild regurgitation.    Tricuspid Valve: There is mild regurgitation.    IVC/SVC: Normal venous pressure at 3 mmHg.       Impression    ICD-10-CM ICD-9-CM   1. CKD stage G3a/A1, GFR 45-59 and albumin creatinine ratio <30 mg/g  N18.31 585.3   2. Primary hypertension  I10 401.9   3. Chronic systolic heart failure  I50.22 428.22   4. Hypercalcemia  E83.52 275.42        Plan  CKD stage G3a/A1, GFR 45-59 and albumin creatinine ratio <30 mg/g  Likely diabetic kidney disease. There is no significant proteinuria.   Renasight genetic test was negative.  Continue risk factor management, ACE inhibitor, and SGLT2 inhibitor.  MRA was discontinued due to worsening kidney function.      Primary hypertension  Blood pressure reading is at goal.  Continue current medication regimen.    Chronic systolic heart failure  Patient is asymptomatic.  Continue current medication regimen.    Hypercalcemia  Will check intact PTH and phosphorus level prior to next visit.      Follow up in about 6 months (around 7/6/2025).     Orders Placed This Encounter   Procedures    Comprehensive Metabolic Panel     Standing Status:   Future      Standing Expiration Date:   7/15/2025    CBC Auto Differential     Standing Status:   Future     Standing Expiration Date:   7/15/2025    Protein/Creatinine Ratio, Urine     Standing Status:   Future     Standing Expiration Date:   7/15/2025     Order Specific Question:   Specimen Source     Answer:   Urine    Phosphorus     Standing Status:   Future     Standing Expiration Date:   7/15/2025    PTH, Intact     Standing Status:   Future     Standing Expiration Date:   7/15/2025    Urinalysis, Reflex to Urine Culture     Standing Status:   Future     Standing Expiration Date:   7/15/2025     Order Specific Question:   Specimen Source     Answer:   Urine    Magnesium     Standing Status:   Future     Standing Expiration Date:   7/15/2025        Laura Davis NP  [facility]

## 2025-01-31 DIAGNOSIS — Z86.73 HISTORY OF CVA IN ADULTHOOD: ICD-10-CM

## 2025-02-03 DIAGNOSIS — Z86.73 HISTORY OF CVA IN ADULTHOOD: ICD-10-CM

## 2025-02-04 RX ORDER — CLOPIDOGREL BISULFATE 75 MG/1
75 TABLET ORAL DAILY
Qty: 90 TABLET | Refills: 2 | Status: SHIPPED | OUTPATIENT
Start: 2025-02-04 | End: 2026-02-04

## 2025-02-04 RX ORDER — CLOPIDOGREL BISULFATE 75 MG/1
75 TABLET ORAL DAILY
Qty: 30 TABLET | Refills: 11 | OUTPATIENT
Start: 2025-02-04

## 2025-02-05 ENCOUNTER — OFFICE VISIT (OUTPATIENT)
Dept: OTOLARYNGOLOGY | Facility: CLINIC | Age: 75
End: 2025-02-05
Payer: MEDICARE

## 2025-02-05 VITALS — TEMPERATURE: 98 F | SYSTOLIC BLOOD PRESSURE: 139 MMHG | DIASTOLIC BLOOD PRESSURE: 70 MMHG | HEART RATE: 70 BPM

## 2025-02-05 DIAGNOSIS — R09.A2 GLOBUS SENSATION: ICD-10-CM

## 2025-02-05 DIAGNOSIS — K21.9 GASTROESOPHAGEAL REFLUX DISEASE, UNSPECIFIED WHETHER ESOPHAGITIS PRESENT: ICD-10-CM

## 2025-02-05 DIAGNOSIS — K22.4 ESOPHAGEAL DYSMOTILITY: Primary | ICD-10-CM

## 2025-02-05 PROCEDURE — 3288F FALL RISK ASSESSMENT DOCD: CPT | Mod: CPTII,,, | Performed by: NURSE PRACTITIONER

## 2025-02-05 PROCEDURE — 99214 OFFICE O/P EST MOD 30 MIN: CPT | Mod: 25,S$PBB,, | Performed by: NURSE PRACTITIONER

## 2025-02-05 PROCEDURE — 31575 DIAGNOSTIC LARYNGOSCOPY: CPT | Mod: S$PBB,,, | Performed by: NURSE PRACTITIONER

## 2025-02-05 PROCEDURE — 99214 OFFICE O/P EST MOD 30 MIN: CPT | Mod: PBBFAC | Performed by: NURSE PRACTITIONER

## 2025-02-05 PROCEDURE — 1159F MED LIST DOCD IN RCRD: CPT | Mod: CPTII,,, | Performed by: NURSE PRACTITIONER

## 2025-02-05 PROCEDURE — 3075F SYST BP GE 130 - 139MM HG: CPT | Mod: CPTII,,, | Performed by: NURSE PRACTITIONER

## 2025-02-05 PROCEDURE — 1101F PT FALLS ASSESS-DOCD LE1/YR: CPT | Mod: CPTII,,, | Performed by: NURSE PRACTITIONER

## 2025-02-05 PROCEDURE — 1126F AMNT PAIN NOTED NONE PRSNT: CPT | Mod: CPTII,,, | Performed by: NURSE PRACTITIONER

## 2025-02-05 PROCEDURE — 31575 DIAGNOSTIC LARYNGOSCOPY: CPT | Mod: PBBFAC | Performed by: NURSE PRACTITIONER

## 2025-02-05 PROCEDURE — 3078F DIAST BP <80 MM HG: CPT | Mod: CPTII,,, | Performed by: NURSE PRACTITIONER

## 2025-02-05 RX ORDER — FAMOTIDINE 40 MG/1
40 TABLET, FILM COATED ORAL NIGHTLY
Qty: 90 TABLET | Refills: 3 | Status: CANCELLED | OUTPATIENT
Start: 2025-02-05 | End: 2026-02-05

## 2025-02-05 RX ORDER — PANTOPRAZOLE SODIUM 40 MG/1
40 TABLET, DELAYED RELEASE ORAL 2 TIMES DAILY
Qty: 60 TABLET | Refills: 11 | Status: SHIPPED | OUTPATIENT
Start: 2025-02-05

## 2025-02-05 RX ORDER — LIDOCAINE HYDROCHLORIDE 40 MG/ML
1 INJECTION, SOLUTION RETROBULBAR ONCE
Status: SHIPPED | OUTPATIENT
Start: 2025-02-05

## 2025-02-05 NOTE — PROGRESS NOTES
The scope used for the exam was:  Flexible scope ENF-P4  Serial Number:  1)    [id number]    [x]   2)    [id number]    []   3)    [id number]    []   4)    [id number]    []   5)    [id number]    []   6)    [id number]    []       The scope used for the exam was:  Rigid scope   Serial Number:  1)   [id number]    []   2)   [id number]    []   3)   7330    []   4)    3384   []   5)    0824   []   6)    5554   []     7)   7425    []   8)   2240    []   9)   1109    []

## 2025-02-05 NOTE — PROGRESS NOTES
"[facility]  Otolaryngology Clinic Note    Neris Contreras  YOB: 1950    Chief Complaint:   Chief Complaint   Patient presents with    referral: Dysphagia        HPI: 08/29/2024: 74 y.o. female referred for dysphagia. Reports this has been present for years. Had EGD last month with esophageal dilation but reports her throat still feels "heavy" when she is swallowing. States symptoms resolvd initially following dilation but seems to have returned. States GI dr wanted her to have her thyroid evaluated. Feels her food sits in her throat for a while before it goes down. Has been on PPI daily since February without improvement. Denies type B symptoms. Never smoker. Denies odynophagia. Occasional transient dysphonia. She also c/o dizziness described as a drunk sensation and difficulty with balance/walking straight. It is present constantly and began about 6 months ago. Denies room spinning. States she has to concentrate very hard to stay straight. Also reports being unable to lie flat in her bed or she gets dizzy, that she has to prop on pillows. Denies any previous otologic history. Denies head trauma or medication changes. No hx of neck surgery. Dysphagia was not r/t anesthesia/surgery.    10/29/2024: No change in dizziness. Also continues to be bothered by dysphagia. States it feels as though her morning meds are currently stuck in her throat. Has swallow eval scheduled at 10:30. Unclear if she is taking aciphex; states she has not had reflux meds changed since GI visit.    02/05/2025: Continues to experience heavy sensation in her throat. Reports a sensation as if food does not digest as it should. States she had no f/u scheduled with Dr. Jimenez after her colonoscopy.    ROS:   10-point review of systems negative except per HPI      Review of patient's allergies indicates:   Allergen Reactions    Iodine Hives     topical    Iodine and iodide containing products     Shellfish containing " products      Other reaction(s): whelps/ itching       Past Medical History:   Diagnosis Date    CVA (cerebral vascular accident)     Depression     Diabetes mellitus     HLD (hyperlipidemia)     Hypertension     Pulmonary HTN     Sleep apnea        Past Surgical History:   Procedure Laterality Date    CARDIAC CATHETERIZATION      CARDIAC SURGERY      EYE SURGERY      LEG SURGERY Right     OPEN REDUCTION AND INTERNAL FIXATION (ORIF) OF INJURY OF ELBOW      REPAIR OF EYELID Left 5/13/2022    Procedure: REPAIR, EYELID;  Surgeon: Isaak Wang MD;  Location: [facility];  Service: ENT;  Laterality: Left;       Social History     Socioeconomic History    Marital status:    Tobacco Use    Smoking status: Never    Smokeless tobacco: Never   Substance and Sexual Activity    Alcohol use: Not Currently     Comment: social    Drug use: Never     Social Determinants of Health     Financial Resource Strain: Low Risk  (2/16/2024)    Overall Financial Resource Strain (CARDIA)     Difficulty of Paying Living Expenses: Not hard at all   Food Insecurity: No Food Insecurity (2/16/2024)    Hunger Vital Sign     Worried About Running Out of Food in the Last Year: Never true     Ran Out of Food in the Last Year: Never true   Transportation Needs: Unmet Transportation Needs (2/16/2024)    PRAPARE - Transportation     Lack of Transportation (Medical): Yes     Lack of Transportation (Non-Medical): Yes   Physical Activity: Sufficiently Active (2/16/2024)    Exercise Vital Sign     Days of Exercise per Week: 3 days     Minutes of Exercise per Session: 60 min   Stress: No Stress Concern Present (2/16/2024)    [employer] - Occupational Stress Questionnaire     Feeling of Stress : Not at all   Housing Stability: High Risk (2/16/2024)    Housing Stability Vital Sign     Unable to Pay for Housing in the Last Year: Yes     Number of Places Lived in the Last Year: 1     Unstable Housing in the Last Year: Yes        Family History   Problem Relation Name Age of Onset    Diabetes Mother      Kidney disease Sister      Kidney disease Son         Outpatient Encounter Medications as of 8/29/2024   Medication Sig Dispense Refill    albuterol (PROVENTIL/VENTOLIN HFA) 90 mcg/actuation inhaler Inhale 2 puffs into the lungs every 4 (four) hours.      amLODIPine (NORVASC) 10 MG tablet Take 1 tablet by mouth once daily 90 tablet 0    atorvastatin (LIPITOR) 20 MG tablet Take 1 tablet (20 mg total) by mouth every evening. 90 tablet 1    benazepriL (LOTENSIN) 40 MG tablet Take 1 tablet by mouth once daily 90 tablet 0    clopidogreL (PLAVIX) 75 mg tablet Take 1 tablet (75 mg total) by mouth once daily. 30 tablet 11    fluticasone propionate (FLONASE) 50 mcg/actuation nasal spray USE 1 SPRAY(S) IN EACH NOSTRIL ONCE DAILY AS NEEDED FOR  RHINTIS 16 g 0    furosemide (LASIX) 40 MG tablet TAKE 1 TABLET BY MOUTH ONCE DAILY AS NEEDED (LEG  EDEMA) 90 tablet 0    hydrALAZINE (APRESOLINE) 100 MG tablet TAKE 1 TABLET BY MOUTH EVERY 12 HOURS 60 tablet 0    JARDIANCE 10 mg tablet Take 1 tablet by mouth once daily 90 tablet 0    LINZESS 145 mcg Cap capsule TAKE 1 CAPSULE BY MOUTH BEFORE BREAKFAST 90 capsule 0    pantoprazole (PROTONIX) 40 MG tablet Take 1 tablet (40 mg total) by mouth once daily. 30 tablet 3    TYRVAYA 0.03 mg/spray sprm by Each Nostril route 2 (two) times a day.      vitamin D (VITAMIN D3) 1000 units Tab Take 1,000 Units by mouth once daily.      ergocalciferol (ERGOCALCIFEROL) 50,000 unit Cap Take 1 capsule (50,000 Units total) by mouth every 7 days. (Patient not taking: Reported on 8/27/2024) 12 capsule 0    fezolinetant 45 mg Tab Take 1 tablet by mouth once daily. (Patient not taking: Reported on 8/29/2024) 30 tablet 1    potassium chloride (K-TAB) 20 mEq Take 20 mEq by mouth once daily. (Patient not taking: Reported on 8/29/2024)      spironolactone (ALDACTONE) 25 MG tablet Take 1 tablet by mouth every morning. (Patient not  taking: Reported on 8/29/2024)      [DISCONTINUED] benazepriL (LOTENSIN) 40 MG tablet Take 1 tablet (40 mg total) by mouth once daily. 90 tablet 1    [DISCONTINUED] fluticasone propionate (FLONASE) 50 mcg/actuation nasal spray 1 spray (50 mcg total) by Each Nostril route daily as needed for Rhinitis. 16 g 0    [DISCONTINUED] furosemide (LASIX) 40 MG tablet TAKE 1 TABLET BY MOUTH ONCE DAILY AS NEEDED (LEG  EDEMA) 90 tablet 0    [DISCONTINUED] JARDIANCE 10 mg tablet Take 1 tablet by mouth once daily 90 tablet 0    [DISCONTINUED] spironolactone (ALDACTONE) 25 MG tablet Take 1 tablet (25 mg total) by mouth once daily. 30 tablet 11     No facility-administered encounter medications on file as of 8/29/2024.       Physical Exam:  Vitals:    08/29/24 0804   BP: 111/66   BP Location: Right arm   Patient Position: Sitting   BP Method: Large (Automatic)   Pulse: (!) 48   Temp: 97.7 °F (36.5 °C)   TempSrc: Oral       Physical Exam   General: NAD, voice normal  Neuro: AAO, CN II - XII grossly intact  Head/ Face: NCAT, symmetric, sensations intact bilaterally  Eyes: EOMI, PERRL  Ears: externally normal with grossly normal hearing  AD: EAC patent, TM intact, no middle ear effusion, no retractions  AS: EAC patent, TM intact, no middle ear effusion, no retractions  Nose: bilateral nares patent, midline septum, no rhinorrhea, no external deformity, +ITH  OC/OP: MMM, no intraoral lesions, no trismus, dentition is moderate, no uvular deviation, bilaterally symmetric soft palate elevation, palatoglossus and palatopharyngeal fold wnl; tonsils are symmetric and 1+  Indirect laryngoscopy: deferred due to patient intolerance  Neck: soft, supple, no LAD, normal ROM, no thyromegaly or palpable nodularity  Respiratory: nonlabored, no wheezing, bilateral chest rise  Cardiovascular: RRR  Gastrointestinal: S NT ND  Skin: warm, no lesions  Musculoskeletal: 5/5 strength  Psych: Appropriate affect/mood     Flexible Fiberoptic  Laryngoscopy/Nasopharyngoscopy    Procedure in Detail: Informed consent was obtained from the patient after explanation of procedure, indications, risks and benefits. Flexible endoscopy was performed through the nasal passages. The nasal cavity, nasopharynx, oropharynx, hypopharynx and larynx were adequately visualized. The true vocal cords and arytenoids were examined during phonation and repose.    Anesthesia: Topical Neosynephrine / Lidocaine  Adverse Events: None  Blood loss: none  Condition: good    Findings:  NP/OP: no masses/lesions of NC, eustachian tube, fossa of Rosenmuller, no adenoid hypertrophy  BOT/vallecula: no lingual hypertrophy, no masses/lesions, no secretions obscuring visualization  Piriform sinuses/post-cricoid: no masses/lesions, no pooling of secretions  Epiglottis: lingual and laryngeal surfaces within normal limits  Arytenoids/FVFs: no masses/lesions, no edema, bilateral mobility. There is some moderate interarytenoid edema and generalized erythema  TVCs: bilateral cord mobility, no masses or lesions  No aspiration, no pooled secretions  No sign of malignancy      Pertinent Data:  ? LABS:    ? AUDIO:             ? PATH:      Imaging:   I personally reviewed the following images:      11/8/24:         Assessment/Plan:  74 y.o. female with dysphagia, hx of esophageal dilation 7/2024. Also with dizziness/dysequilibrium. Audio WNL with type A tymps- reviewed. VNG with central findings, c/w testing in 2019. Esophagram with findings of dysmotility. MBSS WNL. FFL with BRIA.   - Protonix BID (pepcid contraindicated with fezolinetant)   - GERD lifestyle modifications  - F/u with neurology as planned for central dizziness  - F/u with GI for globus and dysmotility   - RTC PRN    Carmencita Mandujano NP

## 2025-02-19 DIAGNOSIS — R60.0 BILATERAL LOWER EXTREMITY EDEMA: Primary | ICD-10-CM

## 2025-02-19 RX ORDER — FUROSEMIDE 40 MG/1
40 TABLET ORAL DAILY PRN
Qty: 60 TABLET | Refills: 1 | Status: SHIPPED | OUTPATIENT
Start: 2025-02-19 | End: 2026-02-19

## 2025-04-01 ENCOUNTER — OFFICE VISIT (OUTPATIENT)
Dept: CARDIOLOGY | Facility: CLINIC | Age: 75
End: 2025-04-01
Payer: MEDICARE

## 2025-04-01 VITALS
HEART RATE: 53 BPM | BODY MASS INDEX: 28.96 KG/M2 | WEIGHT: 173.81 LBS | TEMPERATURE: 98 F | HEIGHT: 65 IN | RESPIRATION RATE: 20 BRPM | SYSTOLIC BLOOD PRESSURE: 132 MMHG | DIASTOLIC BLOOD PRESSURE: 70 MMHG | OXYGEN SATURATION: 100 %

## 2025-04-01 DIAGNOSIS — I73.9 CLAUDICATION OF BOTH LOWER EXTREMITIES: ICD-10-CM

## 2025-04-01 DIAGNOSIS — E78.5 HYPERLIPIDEMIA, UNSPECIFIED HYPERLIPIDEMIA TYPE: ICD-10-CM

## 2025-04-01 DIAGNOSIS — I50.30 HEART FAILURE WITH PRESERVED EJECTION FRACTION, UNSPECIFIED HF CHRONICITY: ICD-10-CM

## 2025-04-01 DIAGNOSIS — N18.9 CHRONIC KIDNEY DISEASE, UNSPECIFIED CKD STAGE: ICD-10-CM

## 2025-04-01 DIAGNOSIS — E11.22 TYPE 2 DIABETES MELLITUS WITH STAGE 3B CHRONIC KIDNEY DISEASE, WITHOUT LONG-TERM CURRENT USE OF INSULIN: ICD-10-CM

## 2025-04-01 DIAGNOSIS — I1A.0 RESISTANT HYPERTENSION: ICD-10-CM

## 2025-04-01 DIAGNOSIS — I73.9 PAD (PERIPHERAL ARTERY DISEASE): ICD-10-CM

## 2025-04-01 DIAGNOSIS — R00.1 BRADYCARDIA: Primary | ICD-10-CM

## 2025-04-01 DIAGNOSIS — R60.0 BILATERAL LOWER EXTREMITY EDEMA: ICD-10-CM

## 2025-04-01 DIAGNOSIS — R25.2 BILATERAL LEG CRAMPS: ICD-10-CM

## 2025-04-01 DIAGNOSIS — R73.03 PREDIABETES: ICD-10-CM

## 2025-04-01 DIAGNOSIS — I27.20 PULMONARY HYPERTENSION: ICD-10-CM

## 2025-04-01 DIAGNOSIS — N18.32 TYPE 2 DIABETES MELLITUS WITH STAGE 3B CHRONIC KIDNEY DISEASE, WITHOUT LONG-TERM CURRENT USE OF INSULIN: ICD-10-CM

## 2025-04-01 LAB
OHS QRS DURATION: 90 MS
OHS QTC CALCULATION: 432 MS

## 2025-04-01 PROCEDURE — 99215 OFFICE O/P EST HI 40 MIN: CPT | Mod: PBBFAC | Performed by: INTERNAL MEDICINE

## 2025-04-01 PROCEDURE — 93005 ELECTROCARDIOGRAM TRACING: CPT

## 2025-04-01 RX ORDER — AMLODIPINE BESYLATE 10 MG/1
10 TABLET ORAL DAILY
Qty: 90 TABLET | Refills: 3 | Status: SHIPPED | OUTPATIENT
Start: 2025-04-01

## 2025-04-01 RX ORDER — ATORVASTATIN CALCIUM 20 MG/1
20 TABLET, FILM COATED ORAL NIGHTLY
Qty: 90 TABLET | Refills: 3 | Status: SHIPPED | OUTPATIENT
Start: 2025-04-01

## 2025-04-01 RX ORDER — BENAZEPRIL HYDROCHLORIDE 40 MG/1
40 TABLET ORAL DAILY
Qty: 90 TABLET | Refills: 3 | Status: SHIPPED | OUTPATIENT
Start: 2025-04-01

## 2025-04-01 RX ORDER — HYDRALAZINE HYDROCHLORIDE 100 MG/1
100 TABLET, FILM COATED ORAL EVERY 12 HOURS
Qty: 180 TABLET | Refills: 3 | Status: SHIPPED | OUTPATIENT
Start: 2025-04-01

## 2025-04-01 NOTE — PROGRESS NOTES
CHIEF COMPLAINT:   Chief Complaint   Patient presents with    f/u denies chest pain or sob since LV no questions                              HPI:  Neris Contreras 74 y.o. female with mild pulm HTN (WHO class II) based on RHC in 2018, TIA, CARISSA not on CPAP, HTN who presents to cardiology clinic for follow up and ongoing care.     She has been feeling well from a CV standpoint. Denies SOB, BLACKMAN, orthopnea, PND. Reports occasional LE edema. She takes lasix less than once a month. Her edema improves with leg elevation. She denies dizziness or lightheadedness. Does not use her CPAP because in the past it made her eyes dry. She has some fatigue.   She tries to stay as active as possible.  During last visit she complained of lower extremity cramping and tingling.  Arterial ultrasound showed mild-to-moderate arterial flow reduction bilaterally and patient was referred to vascular surgery but has not heard yet.                                                                                                                                                                                                                                                                                   CARDIAC TESTING:  Art duplex US of lower ext 10/29/2024:  The right lower extremity demonstrated multiphasic waveforms at all levels with the exception of the ankle vessels, which were monophasic.   There were atherosclerotic changes at all levels within the arteries of the right lower extremity.  The PRABHJOT on the right is invalid due to non compressible tibial vessels.  The right lower extremity demonstrated mild to moderate arterial flow reduction.     The left lower extremity demonstrated multiphasic waveforms at all levels with the exception of the ankle vessels, which were monophasic.   There were atherosclerotic changes at all levels within the arteries of the left lower extremity.  The PRABHJOT on the left is invalid due to non compressible tibial  vessels.  The left lower extremity demonstrated mild to moderate arterial flow reduction.    ABIs 10/29/2024:  The right  lower extremity ankle brachial index is 2.17 suggesting non compressible tibial vessels.   The left lower extremity ankle brachial index is 2.17 suggesting non compressible tibial vessels.   The stress portion of the exam was omitted due to non compressible tibial vessels.       TTE 9.27.23    Left Ventricle: The left ventricle is normal in size. Mildly increased wall thickness. There is low normal systolic function with a visually estimated ejection fraction of 50 - 55%.    Left Atrium: Left atrium is mildly dilated.    Right Ventricle: Normal right ventricular cavity size. Systolic function is normal.    Mitral Valve: There is mild regurgitation.    Tricuspid Valve: There is mild regurgitation.    IVC/SVC: Normal venous pressure at 3 mmHg.     14 Day Cardiac Event Monitor 3.14.23-3.27.23  Underlying rhythm is sinus   No significant arrhythmia noted   During very symptoms, the patient is either in normal sinus rhythm or sinus tachycardia with heart rate up to 102  Average heart rate 69 BPM, minimum heart rate 41 BPM, max heart rate 120 BPM    EKG (3/9/2023):  Sinus bradycardia, LVH     TTE (2019):  Summary  Borderline Concentric left ventricular hypertrophy.  Left ventricular ejection fraction is measured at approximately 60%.     LHC/Cors (2017):  No CAD (no report found)    Patient Active Problem List   Diagnosis    Resistant hypertension    Bradycardia    Chronic kidney disease    Prediabetes    Pulmonary hypertension    Bilateral lower extremity edema    Shortness of breath    Chronic constipation    Hot flashes due to menopause    Chronic midline thoracic back pain    Hyperlipidemia    PAD (peripheral artery disease)    Toe pain, bilateral    Dysphagia    Screening for colon cancer    Bilateral leg cramps    Claudication of both lower extremities    Type 2 diabetes mellitus with stage 3b  chronic kidney disease, without long-term current use of insulin     Past Surgical History:   Procedure Laterality Date    CARDIAC CATHETERIZATION      CARDIAC SURGERY      EYE SURGERY      LEG SURGERY Right     OPEN REDUCTION AND INTERNAL FIXATION (ORIF) OF INJURY OF ELBOW      REPAIR OF EYELID Left 5/13/2022    Procedure: REPAIR, EYELID;  Surgeon: Isaak Wang MD;  Location: [facility];  Service: ENT;  Laterality: Left;     Social History     Socioeconomic History    Marital status:    Tobacco Use    Smoking status: Never    Smokeless tobacco: Never   Substance and Sexual Activity    Alcohol use: Not Currently     Comment: social    Drug use: Never     Social Drivers of Health     Financial Resource Strain: Low Risk  (2/16/2024)    Overall Financial Resource Strain (CARDIA)     Difficulty of Paying Living Expenses: Not hard at all   Food Insecurity: No Food Insecurity (2/16/2024)    Hunger Vital Sign     Worried About Running Out of Food in the Last Year: Never true     Ran Out of Food in the Last Year: Never true   Transportation Needs: High Risk (12/5/2024)    Received from [facility] SDOH Screening     Has lack of transportation kept you from medical appointments, meetings, work or from getting things needed for daily living? choose all that apply.: Yes, it has kept me from medical appointments or from getting my medications   Physical Activity: Sufficiently Active (2/16/2024)    Exercise Vital Sign     Days of Exercise per Week: 3 days     Minutes of Exercise per Session: 60 min   Stress: No Stress Concern Present (2/16/2024)    [employer] - Occupational Stress Questionnaire     Feeling of Stress : Not at all   Housing Stability: High Risk (2/16/2024)    Housing Stability Vital Sign     Unable to Pay for Housing in the Last Year: Yes     Number of Places Lived in the Last Year: 1     Unstable Housing in the Last Year: Yes        Family History   Problem  "Relation Name Age of Onset    Diabetes Mother      Kidney disease Sister      Kidney disease Son       Review of patient's allergies indicates:   Allergen Reactions    Iodine Hives     topical    Iodine and iodide containing products     Shellfish containing products      Other reaction(s): whelps/ itching         ROS:                                                                                                                                                                               Negative except as stated in the history of present illness. See HPI for details.    PHYSICAL EXAM:  Visit Vitals  /70 (BP Location: Left arm, Patient Position: Sitting)   Pulse (!) 53   Temp 97.5 °F (36.4 °C) (Oral)   Resp 20   Ht 5' 5" (1.651 m)   Wt 78.8 kg (173 lb 12.8 oz)   SpO2 100%   BMI 28.92 kg/m²         General: alert and oriented/no acute distress  Eye: EOMI/normal conjunctiva/no xanthelasma  HENT: normocephalic/moist oral mucosa  Neck: supple/nontender/no carotid bruit  Respiratory: lungs CTA/nonlabored respirations/BS equal/symmetrical expansion/no  chest wall tenderness  Cardiovascular: normal rate/normal rhythm/no murmur/normal peripheral perfusion/1+ edema  bilaterally/JVD present on exam (8cm)  Gastrointestinal: soft/nontender  Musculoskeletal: normal ROM  Integumentary: warm/dry/pink/intact  Neurologic: alert/oriented/normal sensory/no focal deficits  Psychiatric: cooperative/appropriate mood and affect/normal judgment      Current Outpatient Medications   Medication Instructions    albuterol (PROVENTIL/VENTOLIN HFA) 90 mcg/actuation inhaler 2 puffs, As needed (PRN)    amLODIPine (NORVASC) 10 mg, Oral, Daily    atorvastatin (LIPITOR) 20 mg, Oral, Nightly    benazepriL (LOTENSIN) 40 mg, Oral, Daily    clopidogreL (PLAVIX) 75 mg, Oral, Daily    empagliflozin (JARDIANCE) 10 mg, Oral, Daily    ergocalciferol (ERGOCALCIFEROL) 50,000 Units, Oral, Every 7 days    fezolinetant 45 mg Tab 1 tablet, Oral, Daily    " fluticasone propionate (FLONASE) 50 mcg, Each Nostril, Daily    furosemide (LASIX) 40 mg, Oral, Daily PRN    hydrALAZINE (APRESOLINE) 100 mg, Oral, Every 12 hours    LINZESS 145 mcg, Oral, Before breakfast    pantoprazole (PROTONIX) 40 mg, Oral, 2 times daily    potassium chloride (K-TAB) 20 mEq Take one capsule the day when taking lasix    TYRVAYA 0.03 mg/spray sprm 2 times daily    vitamin D (VITAMIN D3) 1,000 Units, Daily        All medications, laboratory studies, cardiac diagnostic imaging reviewed.     Lab Results   Component Value Date    LDL 71.00 08/22/2024    LDL 68.00 02/27/2023    TRIG 96 08/22/2024    TRIG 63 02/27/2023    CREATININE 1.25 (H) 01/06/2025    MG 1.80 03/06/2023    K 4.1 01/06/2025        ASSESSMENT/PLAN:  74 y.o. female with the following medical problems:     HFpEF  Mild pulm HTN (WHO class II and probably III due to CARISSA)  No significant SOB, mild volume overload on exam including JVD and lower extremity edema   Patient used to be on Aldactone but it was stopped due to an JOVANY with resolution of JOVANY  Continue benazepril 40 mg, Jardiance 10 mg  Continue Lasix 40 mg p.r.n., patient has only been taking it less than once a month, advised her to take it more frequently.  Prescription for compression stockings given.    Low-salt diet    CVA  - History of CVA/TIA  - Continue plavix, statin  - Denies any further stroke events or symptoms     HTN  - BP at goal today - 132/70  - Continue current regimen amlodipine 10 mg, hydralazine 100 mg bid, benazepril 40 mg.  - Counseled on low-sodium, heart healthy diet and exercise as tolerated    HLP  LDL goal < 70 given CVA and PAD  LDL 71 on 8/22/2024, cont lipitor 20mg    Sinus Bradycardia  - HR 58 BPM today   - 14 Day Holter- underlying sinus rhythm, no significant arrhythmias, no pauses, during symptoms, patient either in normal sinus rhythm or sinus tachy; average heart rate 69  - Denies any bradycardic symptoms - stable from last visit     CKD 3    Followed by nephrology    CARISSA  Unfortunately patient is not able to tolerate CPAP due to eye dryness  Advised her to consider re-evaluation if her eyes improve in order to prevent cardiac and pulm complications in the future    Dysphagia  - Follows closely with PCP and GI.  Had a recent endoscopy with esophageal dilation per patient    BLE tingling  Mild to moderate PAD noted on art US  She was referred to vascular surgery but appointment not scheduled yet  Continue plavix, statin

## 2025-04-01 NOTE — PATIENT INSTRUCTIONS
-Follow up in 6 months    -Paper prescription for compression stockings.   [place] or any [facility] have stockings    -Take Lasix as needed

## 2025-05-28 ENCOUNTER — OFFICE VISIT (OUTPATIENT)
Dept: GYNECOLOGY | Facility: CLINIC | Age: 75
End: 2025-05-28
Payer: MEDICARE

## 2025-05-28 VITALS
OXYGEN SATURATION: 96 % | SYSTOLIC BLOOD PRESSURE: 123 MMHG | TEMPERATURE: 98 F | DIASTOLIC BLOOD PRESSURE: 68 MMHG | WEIGHT: 174 LBS | BODY MASS INDEX: 28.99 KG/M2 | HEIGHT: 65 IN | HEART RATE: 53 BPM

## 2025-05-28 DIAGNOSIS — Z11.3 ENCOUNTER FOR SCREENING FOR INFECTIONS WITH A PREDOMINANTLY SEXUAL MODE OF TRANSMISSION: ICD-10-CM

## 2025-05-28 DIAGNOSIS — N95.1 HOT FLASHES DUE TO MENOPAUSE: Primary | ICD-10-CM

## 2025-05-28 DIAGNOSIS — N89.8 VAGINAL DISCHARGE: ICD-10-CM

## 2025-05-28 LAB
C TRACH DNA SPEC QL NAA+PROBE: NOT DETECTED
CLUE CELLS VAG QL WET PREP: NORMAL
N GONORRHOEA DNA SPEC QL NAA+PROBE: NOT DETECTED
SPECIMEN SOURCE: NORMAL
T VAGINALIS VAG QL WET PREP: NORMAL
WBC #/AREA VAG WET PREP: NORMAL
YEAST SPEC QL WET PREP: NORMAL

## 2025-05-28 PROCEDURE — 99213 OFFICE O/P EST LOW 20 MIN: CPT | Mod: PBBFAC

## 2025-05-28 PROCEDURE — 87591 N.GONORRHOEAE DNA AMP PROB: CPT

## 2025-05-28 PROCEDURE — 87210 SMEAR WET MOUNT SALINE/INK: CPT

## 2025-05-28 NOTE — PROGRESS NOTES
[facility] OB/GYN CLINIC NOTE  [facility]  [address] [place] [address]  Phone: [phone]  Fax: [phone]    Subjective:     Neris Contreras is a 75 y.o.  who presents for f/u of hot flashes.    Hot flashes   - Pt is s/p complete hysterectomy with BSO for AUB-L at approximately 58 years of age. Menopause started at that time.   - Last seen in clinic 2024 and was prescribed Veozah 45mg daily. Took medication for 1 month and self-discontinued due to lack of improvement. Pt states her symptoms are stable, and she does not wish to pursue other options for symptom management.    Vaginal Discharge  - Today, pt is concerned for greyish/white discharge with foul odor. Mild vaginal itching. Denies vaginal bleeding, dysuria, hematuria. Denies new sexual partners, no sexual activity in years. She does use scented wipes and soaps when cleansing.       GynHx:  Menopause at age 58.   Denies hx of STDs  Denies hx of abnormal pap    OBHx:  OB History    Para Term  AB Living   3 3 3   2   SAB IAB Ectopic Multiple Live Births             # Outcome Date GA Lbr Vladislav/2nd Weight Sex Type Anes PTL Lv   3 Term            2 Term            1 Term                      MedHx:   Past Medical History:   Diagnosis Date    CVA (cerebral vascular accident)     Depression     Diabetes mellitus     HLD (hyperlipidemia)     Hypertension     Pulmonary HTN     Sleep apnea        SurgHx:   Past Surgical History:   Procedure Laterality Date    CARDIAC CATHETERIZATION      CARDIAC SURGERY      EYE SURGERY      LEG SURGERY Right     OPEN REDUCTION AND INTERNAL FIXATION (ORIF) OF INJURY OF ELBOW      REPAIR OF EYELID Left 2022    Procedure: REPAIR, EYELID;  Surgeon: Isaak Wang MD;  Location: [facility];  Service: ENT;  Laterality: Left;       Medications:   Current Outpatient Medications   Medication Instructions    albuterol (PROVENTIL/VENTOLIN HFA) 90 mcg/actuation inhaler 2 puffs, As needed (PRN)    amLODIPine (NORVASC)  "10 mg, Oral, Daily    atorvastatin (LIPITOR) 20 mg, Oral, Nightly    benazepriL (LOTENSIN) 40 mg, Oral, Daily    clopidogreL (PLAVIX) 75 mg, Oral, Daily    empagliflozin (JARDIANCE) 10 mg, Oral, Daily    ergocalciferol (ERGOCALCIFEROL) 50,000 Units, Oral, Every 7 days    fezolinetant 45 mg Tab 1 tablet, Oral, Daily    fluticasone propionate (FLONASE) 50 mcg, Each Nostril, Daily    furosemide (LASIX) 40 mg, Oral, Daily PRN    hydrALAZINE (APRESOLINE) 100 mg, Oral, Every 12 hours    LINZESS 145 mcg, Oral, Before breakfast    pantoprazole (PROTONIX) 40 mg, Oral, 2 times daily    potassium chloride (K-TAB) 20 mEq Take one capsule the day when taking lasix    TYRVAYA 0.03 mg/spray sprm 2 times daily    vitamin D (VITAMIN D3) 1,000 Units, Daily       FM Hx:   Family History   Problem Relation Name Age of Onset    Diabetes Mother      Kidney disease Sister      Kidney disease Son        Social Hx:   Social History[1]     Review of Systems  Denies fevers, chills, headache, blurry vision, nausea, vomiting, dizziness, or syncope.  Denies chest pain, shortness of breath, RUQ pain, or calf pain.    Objective:     Vitals:    05/28/25 0952   BP: 123/68   Pulse: (!) 53   Temp: 97.7 °F (36.5 °C)   SpO2: 96%   Weight: 78.9 kg (174 lb)   Height: 5' 5" (1.651 m)     Body mass index is 28.96 kg/m².    Physical Exam:     General: alert and oriented, in no acute distress  Lungs: no conversational dyspnea  Heart: regular rate  Abdomen: Soft, non-distended, non tender to palpation  Extremities: Normal, atraumatic, non-edematous bilaterally   External genitalia: External female genitalia without lesion, discharge or tenderness. Normal appearing urethral meatus. Normal appearing external anus.  Bimanual Exam: No pelvic lymphadenopathy noted bilaterally.   Speculum Exam: Vaginal mucosa pink, smooth without lesions, masses or erythema. Vaginal dryness noted. There is no abnormal discharge or foul odor present on exam. Cervix not " visualized.    Note: RN chaperone present for entirety of exam.    Labs  No results found for this or any previous visit (from the past 24 hours).    Imaging  No images are attached to the encounter.    Paps: Uncertain of last pap. Denies hx of abnormal pap smears.  Mammogram: Negative 2024  Colonoscopy: Colonoscopy 10/2024, tubular adenoma s/p polypectomy     Assessment:   75 y.o.  here for hot flashes and foul-smelling vaginal discharge.     Plan:     - Self-discontinued Veozah after 1 month of use due to lack of improvement. Pt declines additional options for symptom management at this time.  - Wet prep and GC/chlamydia obtained. Irritation and foul odor likely secondary to age-related atrophy. Could consider vaginal estrogen if patient desires symptom relief in the future.  - F/u in 1 year with NP for routine well woman visit     Problem List Items Addressed This Visit          Renal/    Hot flashes due to menopause - Primary     Other Visit Diagnoses         Vaginal discharge        Relevant Orders    Wet Prep, Genital    Chlamydia/GC, PCR      Encounter for screening for infections with a predominantly sexual mode of transmission        Relevant Orders    Chlamydia/GC, PCR            Patient and plan were discussed with Dr. Carrasco.    Ansley Harrington,   [employer]  HO-2         [1]   Social History  Tobacco Use    Smoking status: Never    Smokeless tobacco: Never   Substance Use Topics    Alcohol use: Not Currently     Comment: social    Drug use: Never

## 2025-06-10 ENCOUNTER — HOSPITAL ENCOUNTER (EMERGENCY)
Facility: HOSPITAL | Age: 75
Discharge: HOME | End: 2025-06-10
Attending: EMERGENCY MEDICINE
Payer: MEDICARE

## 2025-06-10 VITALS
BODY MASS INDEX: 25.71 KG/M2 | DIASTOLIC BLOOD PRESSURE: 72 MMHG | OXYGEN SATURATION: 100 % | RESPIRATION RATE: 20 BRPM | HEIGHT: 66 IN | WEIGHT: 160 LBS | TEMPERATURE: 98 F | SYSTOLIC BLOOD PRESSURE: 145 MMHG | HEART RATE: 82 BPM
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
## 2025-06-10 DIAGNOSIS — S83.92XA SPRAIN OF LEFT KNEE, UNSPECIFIED LIGAMENT, INITIAL ENCOUNTER: Primary | ICD-10-CM

## 2025-06-10 DIAGNOSIS — M25.562 ACUTE PAIN OF LEFT KNEE: ICD-10-CM

## 2025-06-10 PROCEDURE — 99283 EMERGENCY DEPT VISIT LOW MDM: CPT | Mod: 25

## 2025-06-10 PROCEDURE — 25000003 PHARM REV CODE 250

## 2025-06-10 RX ORDER — TRAMADOL HYDROCHLORIDE 50 MG/1
50 TABLET, FILM COATED ORAL EVERY 6 HOURS PRN
Qty: 12 TABLET | Refills: 0 | Status: SHIPPED | OUTPATIENT
Start: 2025-06-10 | End: 2025-06-13

## 2025-06-10 RX ORDER — TRAMADOL HYDROCHLORIDE 50 MG/1
50 TABLET, FILM COATED ORAL
Status: COMPLETED | OUTPATIENT
Start: 2025-06-10 | End: 2025-06-10

## 2025-06-10 RX ADMIN — TRAMADOL HYDROCHLORIDE 50 MG: 50 TABLET, COATED ORAL at 10:06

## 2025-06-10 NOTE — FIRST PROVIDER EVALUATION
"Medical screening examination initiated.  I have conducted a focused provider triage encounter, findings are as follows:    Brief history of present illness:  75 year old female presents to ER with c/o left knee pain. Patient states she tripped and fell on a sidewalk. She denies hitting her head or neck. States she fell directly onto her knee    Vitals:    06/10/25 0916   BP: (!) 147/68   Pulse: 83   Resp: 20   Temp: 97.8 °F (36.6 °C)   TempSrc: Oral   SpO2: 100%   Weight: 72.6 kg (160 lb)   Height: 5' 6" (1.676 m)       Pertinent physical exam:  awake and alert, nad    Brief workup plan:  imaging     Preliminary workup initiated; this workup will be continued and followed by the physician or advanced practice provider that is assigned to the patient when roomed.  "

## 2025-06-10 NOTE — ED PROVIDER NOTES
Encounter Date: 6/10/2025       History     Chief Complaint   Patient presents with    Knee Pain     L knee pain after fall yesterday. Did not hit head. Ambulatory with EMS.     The patient is a 75 y.o. female with a history of hypertension, hyperlipidemia, diabetes, and sleep apnea who presents to the Emergency Department with a chief complaint of left knee pain. Patient reports she tripped on sidewalk and fell onto her left knee. She denies hitting her head or neck. Symptoms began yesterday and have been constant since onset. Her pain is currently rated as a 8/10 in severity and described as aching with no radiation. Associated symptoms include swelling. Symptoms are aggravated with movement and there are no alleviating factors. The patient denies numbness or tingling to extremities. She reports taking nothing prior to arrival with no relief of symptoms. No other reported symptoms at this time.      The history is provided by the patient. No  was used.   Knee Pain  This is a new problem. The current episode started yesterday. The problem has not changed since onset.Pertinent negatives include no chest pain, no abdominal pain, no headaches and no shortness of breath. The symptoms are aggravated by bending and walking. Nothing relieves the symptoms. She has tried nothing for the symptoms. The treatment provided no relief.     Review of patient's allergies indicates:   Allergen Reactions    Iodine Hives     topical    Iodine and iodide containing products     Shellfish containing products      Other reaction(s): whelps/ itching     Past Medical History:   Diagnosis Date    CVA (cerebral vascular accident)     Depression     Diabetes mellitus     HLD (hyperlipidemia)     Hypertension     Pulmonary HTN     Sleep apnea      Past Surgical History:   Procedure Laterality Date    CARDIAC CATHETERIZATION      CARDIAC SURGERY      EYE SURGERY      LEG SURGERY Right     OPEN REDUCTION AND INTERNAL FIXATION  (ORIF) OF INJURY OF ELBOW      REPAIR OF EYELID Left 5/13/2022    Procedure: REPAIR, EYELID;  Surgeon: Isaak Wang MD;  Location: [facility] OR;  Service: ENT;  Laterality: Left;     Family History   Problem Relation Name Age of Onset    Diabetes Mother      Kidney disease Sister      Kidney disease Son       Social History[1]  Review of Systems   Constitutional:  Negative for chills and fever.   HENT:  Negative for sore throat.    Respiratory:  Negative for shortness of breath.    Cardiovascular:  Negative for chest pain.   Gastrointestinal:  Negative for abdominal pain and nausea.   Genitourinary:  Negative for dysuria.   Musculoskeletal:  Positive for arthralgias and joint swelling. Negative for back pain.   Skin:  Negative for rash.   Neurological:  Negative for weakness and headaches.   Hematological:  Does not bruise/bleed easily.   All other systems reviewed and are negative.      Physical Exam     Initial Vitals [06/10/25 0916]   BP Pulse Resp Temp SpO2   (!) 147/68 83 20 97.8 °F (36.6 °C) 100 %      MAP       --         Physical Exam    Nursing note and vitals reviewed.  Constitutional: She appears well-developed and well-nourished.   HENT:   Head: Normocephalic.   Right Ear: Hearing and tympanic membrane normal.   Left Ear: Hearing and tympanic membrane normal. Mouth/Throat: Uvula is midline, oropharynx is clear and moist and mucous membranes are normal.   Eyes: Conjunctivae and EOM are normal. Pupils are equal, round, and reactive to light.   Cardiovascular:  Normal rate, regular rhythm, normal heart sounds and normal pulses.           Pulmonary/Chest: Effort normal and breath sounds normal.   Abdominal: Abdomen is soft. Bowel sounds are normal. There is no abdominal tenderness.   Musculoskeletal:      Right knee: Normal.      Left knee: Swelling and bony tenderness present. Decreased range of motion. Normal pulse.      Comments: All other adjacent joints normal      Lymphadenopathy:     She has no  cervical adenopathy.   Neurological: She is alert. GCS eye subscore is 4. GCS verbal subscore is 5. GCS motor subscore is 6.   Skin: Skin is warm, dry and intact. Capillary refill takes less than 2 seconds.         ED Course   Procedures  Labs Reviewed - No data to display       Imaging Results              X-Ray Knee Complete 4 or More Views Left (Final result)  Result time 06/10/25 10:43:34      Final result by Katrin Trejo MD (06/10/25 10:43:34)                   Impression:      No acute bony abnormality.      Electronically signed by: Katrin Trejo  Date:    06/10/2025  Time:    10:43               Narrative:    EXAMINATION:  XR KNEE COMP 4 OR MORE VIEWS LEFT    CLINICAL HISTORY:  Unspecified fall, initial encounter    COMPARISON:  None.    FINDINGS:  There is no acute fracture or malalignment.  There are small tricompartmental osteophytes.  There is a moderate knee joint effusion.  Vascular calcifications are noted.                                       Medications   traMADoL tablet 50 mg (50 mg Oral Given 6/10/25 1032)     Medical Decision Making  The patient is a 75 y.o. female with a history of hypertension, hyperlipidemia, diabetes, and sleep apnea who presents to the Emergency Department with a chief complaint of left knee pain. Patient reports she tripped on sidewalk and fell onto her left knee. She denies hitting her head or neck. Symptoms began yesterday and have been constant since onset. Her pain is currently rated as a 8/10 in severity and described as aching with no radiation. Associated symptoms include swelling. Symptoms are aggravated with movement and there are no alleviating factors. The patient denies numbness or tingling to extremities. She reports taking nothing prior to arrival with no relief of symptoms. No other reported symptoms at this time.      Judging by the patient's chief complaint and pertinent history, the patient has the following possible differential diagnoses,  including but not limited to the following.  Some of these are deemed to be lower likelihood and some more likely based on my physical exam and history combined with possible lab work and/or imaging studies.   Please see the pertinent studies, and refer to the HPI.  Some of these diagnoses will take further evaluation to fully rule out, perhaps as an outpatient and the patient was encouraged to follow up when discharged for more comprehensive evaluation.    Fall, knee sprain, knee fracture, ligamentous injury     Problems Addressed:  Acute pain of left knee: acute illness or injury  Fall: acute illness or injury  Sprain of left knee, unspecified ligament, initial encounter: acute illness or injury    Amount and/or Complexity of Data Reviewed  Radiology: ordered. Decision-making details documented in ED Course.    Risk  Prescription drug management.               ED Course as of 06/10/25 1134   Tue Geovanni 10, 2025   1047 X-Ray Knee Complete 4 or More Views Left  Impression:     No acute bony abnormality.   [LM]   1126 Patient reports improvement in pain.  I discussed results in detail with the patient including follow up.  She is amenable to plan and ready for discharge home.  She was given strict ER return precautions. [LM]      ED Course User Index  [LM] Usman Ramsey NP                           Clinical Impression:  Final diagnoses:  [W19.XXXA] Fall  [S83.92XA] Sprain of left knee, unspecified ligament, initial encounter (Primary)  [M25.562] Acute pain of left knee          ED Disposition Condition    Discharge Stable          ED Prescriptions       Medication Sig Dispense Start Date End Date Auth. Provider    traMADoL (ULTRAM) 50 mg tablet Take 1 tablet (50 mg total) by mouth every 6 (six) hours as needed for Pain. 12 tablet 6/10/2025 6/13/2025 Usman Ramsey NP          Follow-up Information       Follow up With Specialties Details Why Contact Info    [facility] Schedule  an appointment as soon as possible for a visit   [facility] [facility] [id number] [phone]                   [1]   Social History  Tobacco Use    Smoking status: Never    Smokeless tobacco: Never   Substance Use Topics    Alcohol use: Not Currently     Comment: social    Drug use: Never        Usman Ramsey NP  06/10/25 1131

## 2025-06-24 ENCOUNTER — OFFICE VISIT (OUTPATIENT)
Dept: FAMILY MEDICINE | Facility: CLINIC | Age: 75
End: 2025-06-24
Payer: MEDICARE

## 2025-06-24 VITALS
HEART RATE: 86 BPM | HEIGHT: 66 IN | RESPIRATION RATE: 20 BRPM | OXYGEN SATURATION: 100 % | TEMPERATURE: 98 F | SYSTOLIC BLOOD PRESSURE: 139 MMHG | WEIGHT: 175.19 LBS | DIASTOLIC BLOOD PRESSURE: 72 MMHG | BODY MASS INDEX: 28.15 KG/M2

## 2025-06-24 DIAGNOSIS — R10.826 EPIGASTRIC ABDOMINAL TENDERNESS WITH REBOUND TENDERNESS: ICD-10-CM

## 2025-06-24 DIAGNOSIS — N18.32 TYPE 2 DIABETES MELLITUS WITH STAGE 3B CHRONIC KIDNEY DISEASE, WITHOUT LONG-TERM CURRENT USE OF INSULIN: Primary | ICD-10-CM

## 2025-06-24 DIAGNOSIS — Z86.19 HISTORY OF HELICOBACTER PYLORI INFECTION: ICD-10-CM

## 2025-06-24 DIAGNOSIS — Z78.0 ASYMPTOMATIC MENOPAUSAL STATE: ICD-10-CM

## 2025-06-24 DIAGNOSIS — E11.22 TYPE 2 DIABETES MELLITUS WITH STAGE 3B CHRONIC KIDNEY DISEASE, WITHOUT LONG-TERM CURRENT USE OF INSULIN: Primary | ICD-10-CM

## 2025-06-24 DIAGNOSIS — E78.5 HYPERLIPIDEMIA, UNSPECIFIED HYPERLIPIDEMIA TYPE: ICD-10-CM

## 2025-06-24 DIAGNOSIS — M25.562 ACUTE PAIN OF LEFT KNEE: ICD-10-CM

## 2025-06-24 DIAGNOSIS — E55.9 VITAMIN D DEFICIENCY: ICD-10-CM

## 2025-06-24 DIAGNOSIS — R89.9 ABNORMAL LABORATORY TEST: ICD-10-CM

## 2025-06-24 DIAGNOSIS — I1A.0 RESISTANT HYPERTENSION: ICD-10-CM

## 2025-06-24 LAB
25(OH)D3+25(OH)D2 SERPL-MCNC: 22 NG/ML (ref 30–80)
ALBUMIN/CREAT UR: 4 MG/GM CR (ref 0–30)
ANION GAP SERPL CALC-SCNC: 7 MEQ/L
BASOPHILS # BLD AUTO: 0.03 X10(3)/MCL
BASOPHILS NFR BLD AUTO: 0.6 %
BUN SERPL-MCNC: 21.9 MG/DL (ref 9.8–20.1)
CALCIUM SERPL-MCNC: 10 MG/DL (ref 8.4–10.2)
CHLORIDE SERPL-SCNC: 106 MMOL/L (ref 98–107)
CHOLEST SERPL-MCNC: 127 MG/DL
CHOLEST/HDLC SERPL: 3 {RATIO} (ref 0–5)
CK SERPL-CCNC: 172 U/L (ref 29–168)
CO2 SERPL-SCNC: 27 MMOL/L (ref 23–31)
CREAT SERPL-MCNC: 1.44 MG/DL (ref 0.55–1.02)
CREAT UR-MCNC: 130.2 MG/DL (ref 45–106)
CREAT/UREA NIT SERPL: 15
EOSINOPHIL # BLD AUTO: 0.26 X10(3)/MCL (ref 0–0.9)
EOSINOPHIL NFR BLD AUTO: 5.1 %
ERYTHROCYTE [DISTWIDTH] IN BLOOD BY AUTOMATED COUNT: 13.6 % (ref 11.5–17)
EST. AVERAGE GLUCOSE BLD GHB EST-MCNC: 108.3 MG/DL
GFR SERPLBLD CREATININE-BSD FMLA CKD-EPI: 38 ML/MIN/1.73/M2
GLUCOSE SERPL-MCNC: 91 MG/DL (ref 82–115)
HBA1C MFR BLD: 5.4 %
HCT VFR BLD AUTO: 36.2 % (ref 37–47)
HDLC SERPL-MCNC: 48 MG/DL (ref 35–60)
HGB BLD-MCNC: 11.1 G/DL (ref 12–16)
IMM GRANULOCYTES # BLD AUTO: 0.01 X10(3)/MCL (ref 0–0.04)
IMM GRANULOCYTES NFR BLD AUTO: 0.2 %
LDLC SERPL CALC-MCNC: 69 MG/DL (ref 50–140)
LYMPHOCYTES # BLD AUTO: 1.97 X10(3)/MCL (ref 0.6–4.6)
LYMPHOCYTES NFR BLD AUTO: 38.3 %
MCH RBC QN AUTO: 28 PG (ref 27–31)
MCHC RBC AUTO-ENTMCNC: 30.7 G/DL (ref 33–36)
MCV RBC AUTO: 91.2 FL (ref 80–94)
MICROALBUMIN UR-MCNC: 5.2 UG/ML
MONOCYTES # BLD AUTO: 0.55 X10(3)/MCL (ref 0.1–1.3)
MONOCYTES NFR BLD AUTO: 10.7 %
NEUTROPHILS # BLD AUTO: 2.32 X10(3)/MCL (ref 2.1–9.2)
NEUTROPHILS NFR BLD AUTO: 45.1 %
NRBC BLD AUTO-RTO: 0 %
PLATELET # BLD AUTO: 253 X10(3)/MCL (ref 130–400)
PMV BLD AUTO: 10 FL (ref 7.4–10.4)
POTASSIUM SERPL-SCNC: 3.8 MMOL/L (ref 3.5–5.1)
RBC # BLD AUTO: 3.97 X10(6)/MCL (ref 4.2–5.4)
SODIUM SERPL-SCNC: 140 MMOL/L (ref 136–145)
TRIGL SERPL-MCNC: 48 MG/DL (ref 37–140)
TSH SERPL-ACNC: 1.01 UIU/ML (ref 0.35–4.94)
VLDLC SERPL CALC-MCNC: 10 MG/DL
WBC # BLD AUTO: 5.14 X10(3)/MCL (ref 4.5–11.5)

## 2025-06-24 PROCEDURE — 80061 LIPID PANEL: CPT

## 2025-06-24 PROCEDURE — 83036 HEMOGLOBIN GLYCOSYLATED A1C: CPT

## 2025-06-24 PROCEDURE — 80048 BASIC METABOLIC PNL TOTAL CA: CPT

## 2025-06-24 PROCEDURE — 82550 ASSAY OF CK (CPK): CPT

## 2025-06-24 PROCEDURE — 82570 ASSAY OF URINE CREATININE: CPT

## 2025-06-24 PROCEDURE — 85025 COMPLETE CBC W/AUTO DIFF WBC: CPT

## 2025-06-24 PROCEDURE — 82306 VITAMIN D 25 HYDROXY: CPT

## 2025-06-24 PROCEDURE — 99215 OFFICE O/P EST HI 40 MIN: CPT | Mod: PBBFAC

## 2025-06-24 PROCEDURE — 84443 ASSAY THYROID STIM HORMONE: CPT
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
## 2025-06-25 PROBLEM — Z86.73 HISTORY OF TRANSIENT ISCHEMIC ATTACK (TIA): Status: ACTIVE | Noted: 2025-06-25

## 2025-06-25 NOTE — PROGRESS NOTES
I reviewed History, PE, A/P and medical record.  Services provided in outpatient department of a teaching hospital/facility, I was immediately available.  I agree with resident, care reasonable and necessary with any exceptions stated below.  I evaluated the patient with resident at time of visit, participated in key parts of H/P and management was discussed.    [facility] 2017 - predominantly nl coronaries, Hypertensive heart disease  [facility] 2018- pHTN ,pressure 30-35    Doing well overall  Has upcoming neuro appt for dizziness  Lungs CTA   CVS = RRR,  no M    Amy Walton MD  [facility] - [facility] [place]  [facility]

## 2025-06-27 NOTE — PROGRESS NOTES
[facility] Neurology Initial Office Visit Note    Initial Visit Date: 7/1/2025  Current Visit Date:  07/01/2025    Chief Complaint:     Chief Complaint   Patient presents with    Dizziness     Patient reports imbalance and feelings of spinning when she moves her head up and down.        History of Present Illness:      This is 75 y.o. female with history of hypertension, DM II with CKD IIIb and retinopathy, pulmonary hypertension, who is referred for dizziness. She stated that she would feel off balance when she walks for around 1 year, not associated with other positions. She would feel lightheaded when she gets out of bed to use the restroom. She reports that she has paresthesia in bilateral feet for more than 1 year. She does not smoke. She drinks occasionally.       Medications:     Medications Ordered Prior to Encounter[1]    Labs:     Results for orders placed or performed in visit on 06/24/25   Microalbumin/Creatinine Ratio, Urine    Collection Time: 06/24/25  2:19 PM   Result Value Ref Range    Urine Microalbumin 5.2 <=30.0 ug/mL    Urine Creatinine 130.2 (H) 45.0 - 106.0 mg/dL    Microalbumin Creatinine Ratio 4.0 0.0 - 30.0 mg/gm Cr   Hemoglobin A1C    Collection Time: 06/24/25  2:45 PM   Result Value Ref Range    Hemoglobin A1c 5.4 <=7.0 %    Estimated Average Glucose 108.3 mg/dL   Basic Metabolic Panel    Collection Time: 06/24/25  2:45 PM   Result Value Ref Range    Sodium 140 136 - 145 mmol/L    Potassium 3.8 3.5 - 5.1 mmol/L    Chloride 106 98 - 107 mmol/L    CO2 27 23 - 31 mmol/L    Glucose 91 82 - 115 mg/dL    Blood Urea Nitrogen 21.9 (H) 9.8 - 20.1 mg/dL    Creatinine 1.44 (H) 0.55 - 1.02 mg/dL    BUN/Creatinine Ratio 15     Calcium 10.0 8.4 - 10.2 mg/dL    Anion Gap 7.0 mEq/L    eGFR 38 mL/min/1.73/m2   Vitamin D    Collection Time: 06/24/25  2:45 PM   Result Value Ref Range    Vitamin D 22 (L) 30 - 80 ng/mL   Lipid Panel    Collection Time: 06/24/25  2:45 PM   Result Value Ref Range    Cholesterol Total  127 <=200 mg/dL    HDL Cholesterol 48 35 - 60 mg/dL    Triglyceride 48 37 - 140 mg/dL    Cholesterol/HDL Ratio 3 0 - 5    Very Low Density Lipoprotein 10     LDL Cholesterol 69.00 50.00 - 140.00 mg/dL   TSH    Collection Time: 06/24/25  2:45 PM   Result Value Ref Range    TSH 1.008 0.350 - 4.940 uIU/mL   CK    Collection Time: 06/24/25  2:45 PM   Result Value Ref Range    Creatine Kinase 172 (H) 29 - 168 U/L   CBC with Differential    Collection Time: 06/24/25  2:45 PM   Result Value Ref Range    WBC 5.14 4.50 - 11.50 x10(3)/mcL    RBC 3.97 (L) 4.20 - 5.40 x10(6)/mcL    Hgb 11.1 (L) 12.0 - 16.0 g/dL    Hct 36.2 (L) 37.0 - 47.0 %    MCV 91.2 80.0 - 94.0 fL    MCH 28.0 27.0 - 31.0 pg    MCHC 30.7 (L) 33.0 - 36.0 g/dL    RDW 13.6 11.5 - 17.0 %    Platelet 253 130 - 400 x10(3)/mcL    MPV 10.0 7.4 - 10.4 fL    Neut % 45.1 %    Lymph % 38.3 %    Mono % 10.7 %    Eos % 5.1 %    Basophil % 0.6 %    Imm Grans % 0.2 %    Neut # 2.32 2.1 - 9.2 x10(3)/mcL    Lymph # 1.97 0.6 - 4.6 x10(3)/mcL    Mono # 0.55 0.1 - 1.3 x10(3)/mcL    Eos # 0.26 0 - 0.9 x10(3)/mcL    Baso # 0.03 <=0.2 x10(3)/mcL    Imm Gran # 0.01 0.00 - 0.04 x10(3)/mcL    NRBC% 0.0 %       Studies:      Audiological/Vestibular Evaluation     Patient is a 74 year old female presenting with symptoms of chronic balance difficulties and light-headedness.  The onset of symptoms is reported to occur several years prior due to unknown etiology.  Mrs. Contreras has previously undergone vestibular testing in 2019 for similar symptomology.  She reports continuous and daily issues with maintaining her balance.  She also endorses light-headedness which may occur with lam changes in head movements.  No changes in hearing/tinnitus has been endorsed nor any triggers/warning signs related to her dizziness.  She denies any recent changes in medication as well as any new medical diagnosis at this time.      Pure Tone Testing:      Right ear:       Normal hearing sensitivity for the  frequencies 250-8000 Hz     Left ear:          Normal hearing sensitivity for the frequencies 250-8000 Hz     Tympanometry:       Right ear:       Type 'A' tympanogram      Left ear:          Type 'A' tympanogram      DPOAE Testing:     Right ear:       Present emissions 8406-9137 Hz     Left ear:          Present emissions 6483-2043 Hz           Vestibular Results:     Spontaneous:  No spontaneous nystagmus observed for sitting position with vision denied.  Fixation ability was noted.   Gaze:              No gaze-evoked nystagmus.    Saccades:      Abnormal saccade velocity, latency and accuracy (Likely age-related abnormalities)              Tracking:        Normal tracking accuracy and gain.  OPK:               No asymmetry noted.    Positionals:    Vertical (up beat) nystagmus noted for static head right/head left position and body left positions with vision denied.   Positioning:   No rotary nystagmus noted for DHP maneuver head right/left.   Calorics:         Reduced caloric responses                          Caloric vestibular responses: RC 2, RW 3, LW 4, LC 1 deg/s.           Interpretations:     Pure tone testing revealed normal hearing sensitivity for the frequencies 250-8000 Hz, bilaterally.  Today's findings consistent with previous testing performed in 2019 indicative of stable hearing.  Speech reception thresholds were obtained at 15 dB HL, bilaterally, consistent with pure tone testing.  Word recognition scores were excellent, bilaterally. Tympanometry testing revealed Type A tympanograms, bilaterally, indicative of normal middle ear function.  DPOAE testing revealed present emissions for the frequencies noted indicative of normal cochlear physiology, bilaterally.  Otoscopy revealed clear EACs, bilaterally.      This patient's videonystagmogram was abnormal.  Oculomotor testing was within normal limits. Positional testing revealed vertical nystagmus for most conditions with vision denied. Positioning  testing did not reveal any type of nystagmus and therefore, negative for canalith involvement.  The caloric irrigation test revealed symmetrical, yet reduced responses for air irrigations, bilaterally.  Todays findings indicative of possible central nervous system involvement. Today's findings consistent with previous vestibular testing performed in 2019, therefore no further recommendations for CNS abnormalities as patient had imaging performed in 2019.       Recommendations:           Return to referring ENT for further follow up   Consider balance retraining therapy under the guidance of PT to assess gait and prevent future falls           Review of Systems:     Review of Systems   All other systems reviewed and are negative.      Physical Exams:     There were no vitals filed for this visit.    Physical Exam  Vitals and nursing note reviewed.   Constitutional:       Appearance: Normal appearance.   HENT:      Head: Normocephalic and atraumatic.      Nose: Nose normal.      Mouth/Throat:      Mouth: Mucous membranes are moist.      Pharynx: Oropharynx is clear.   Eyes:      Conjunctiva/sclera: Conjunctivae normal.   Cardiovascular:      Rate and Rhythm: Normal rate and regular rhythm.      Pulses: Normal pulses.   Pulmonary:      Effort: Pulmonary effort is normal.      Breath sounds: Normal breath sounds.   Abdominal:      General: Abdomen is flat.   Musculoskeletal:         General: Normal range of motion.      Cervical back: Normal range of motion.      Comments: Varicose veins LLE   Skin:     General: Skin is warm.   Neurological:      Mental Status: She is alert.         Comprehensive Neurological Exam:  Mental Status: Alert Oriented to Self, Date, and Place. Comprehension wnl. No dysarthria.   CN II - XII: pupil pinpoint OU does not dilated in dark or with eye closure, VFFC, No ptosis OU, EOMI without nystagmus, LT/Temp symmetric in CN V1-3 distribution, Hearing grossly intact, Face Symmetric, Tongue and  Uvula midline, Trapezius symmetric bilateral.   Motor: tone and bulk wnl throughout, no abnormal involuntary or voluntary movements, 5/5 to confrontation, Fine finger movements wnl b/l, No pronator drift.   Sensory: PP, Temp symmetric. Proprioception, Vibration diminished in bilateral LE to ankle. No sensory simultagnosia.   Reflexes: 2+ throughout except for 1+ in bilateral AJ, plantar reflexes downward bilateral.   Cerebellar: FNF wnl b/l, RAHM wnl b/l  Romberg: Negative  Gait: normal.     Assessment:     This is 75 y.o. female with history of hypertension, DM II with CKD IIIb and retinopathy, pulmonary hypertension, who is referred for orthostatic hypotension and sensory ataxia from diabetic polyneuropathy.    1. Type 2 diabetes mellitus with stage 3b chronic kidney disease, without long-term current use of insulin    2. Diabetic sensory polyneuropathy  -     Ambulatory referral/consult to Neurology    3. Diabetic autonomic neuropathy associated with type 2 diabetes mellitus        Plan:     [] compression stockings  [] referral to outpatient PT    RTC PRN     I have explained the treatment plan, diagnosis, and prognosis to patient. All questions are answered to the best of my knowledge.     Face to face time 45 minutes, including documentation, chart review, counseling, education, review of test results, relevant medical records, and coordination of care.   I have explained the treatment plan, diagnosis, and prognosis to patient. All questions are answered to the best of my knowledge.       Alma Coffman MD   General Neurology  07/01/2025           [1]   Current Outpatient Medications on File Prior to Visit   Medication Sig Dispense Refill    amLODIPine (NORVASC) 10 MG tablet Take 1 tablet (10 mg total) by mouth once daily. 90 tablet 3    atorvastatin (LIPITOR) 20 MG tablet Take 1 tablet (20 mg total) by mouth every evening. 90 tablet 3    benazepriL (LOTENSIN) 40 MG tablet Take 1 tablet (40 mg total) by mouth  once daily. 90 tablet 3    clopidogreL (PLAVIX) 75 mg tablet Take 1 tablet (75 mg total) by mouth once daily. 90 tablet 2    empagliflozin (JARDIANCE) 10 mg tablet Take 1 tablet (10 mg total) by mouth once daily. 90 tablet 3    fluticasone propionate (FLONASE) 50 mcg/actuation nasal spray 1 spray (50 mcg total) by Each Nostril route Daily. 16 g 3    furosemide (LASIX) 40 MG tablet Take 1 tablet (40 mg total) by mouth daily as needed (leg swelling). 60 tablet 1    hydrALAZINE (APRESOLINE) 100 MG tablet Take 1 tablet (100 mg total) by mouth every 12 (twelve) hours. 180 tablet 3    linaCLOtide (LINZESS) 145 mcg Cap capsule TAKE 1 CAPSULE BY MOUTH BEFORE BREAKFAST 90 capsule 2    pantoprazole (PROTONIX) 40 MG tablet Take 1 tablet (40 mg total) by mouth 2 (two) times daily. 60 tablet 11     No current facility-administered medications on file prior to visit.

## 2025-07-01 ENCOUNTER — OFFICE VISIT (OUTPATIENT)
Dept: NEUROLOGY | Facility: CLINIC | Age: 75
End: 2025-07-01
Payer: MEDICARE

## 2025-07-01 ENCOUNTER — OFFICE VISIT (OUTPATIENT)
Dept: NEPHROLOGY | Facility: CLINIC | Age: 75
End: 2025-07-01
Payer: MEDICARE

## 2025-07-01 ENCOUNTER — HOSPITAL ENCOUNTER (OUTPATIENT)
Dept: RADIOLOGY | Facility: HOSPITAL | Age: 75
Discharge: HOME | End: 2025-07-01
Attending: SURGERY
Payer: MEDICARE

## 2025-07-01 ENCOUNTER — OFFICE VISIT (OUTPATIENT)
Dept: ORTHOPEDICS | Facility: CLINIC | Age: 75
End: 2025-07-01
Payer: MEDICARE

## 2025-07-01 VITALS
HEIGHT: 66 IN | DIASTOLIC BLOOD PRESSURE: 75 MMHG | WEIGHT: 170 LBS | HEART RATE: 65 BPM | SYSTOLIC BLOOD PRESSURE: 128 MMHG | BODY MASS INDEX: 27.32 KG/M2

## 2025-07-01 VITALS
BODY MASS INDEX: 27.74 KG/M2 | SYSTOLIC BLOOD PRESSURE: 124 MMHG | HEIGHT: 66 IN | TEMPERATURE: 98 F | RESPIRATION RATE: 16 BRPM | OXYGEN SATURATION: 99 % | HEART RATE: 60 BPM | WEIGHT: 172.63 LBS | DIASTOLIC BLOOD PRESSURE: 73 MMHG

## 2025-07-01 VITALS
HEART RATE: 65 BPM | WEIGHT: 170.88 LBS | DIASTOLIC BLOOD PRESSURE: 75 MMHG | HEIGHT: 66 IN | TEMPERATURE: 98 F | BODY MASS INDEX: 27.46 KG/M2 | OXYGEN SATURATION: 99 % | RESPIRATION RATE: 18 BRPM | SYSTOLIC BLOOD PRESSURE: 128 MMHG

## 2025-07-01 DIAGNOSIS — E11.22 TYPE 2 DIABETES MELLITUS WITH STAGE 3B CHRONIC KIDNEY DISEASE, WITHOUT LONG-TERM CURRENT USE OF INSULIN: Primary | ICD-10-CM

## 2025-07-01 DIAGNOSIS — N25.81 SECONDARY HYPERPARATHYROIDISM OF RENAL ORIGIN: ICD-10-CM

## 2025-07-01 DIAGNOSIS — M25.562 ACUTE PAIN OF LEFT KNEE: ICD-10-CM

## 2025-07-01 DIAGNOSIS — M17.12 PRIMARY OSTEOARTHRITIS OF LEFT KNEE: Primary | ICD-10-CM

## 2025-07-01 DIAGNOSIS — N18.32 TYPE 2 DIABETES MELLITUS WITH STAGE 3B CHRONIC KIDNEY DISEASE, WITHOUT LONG-TERM CURRENT USE OF INSULIN: Primary | ICD-10-CM

## 2025-07-01 DIAGNOSIS — E11.43 DIABETIC AUTONOMIC NEUROPATHY ASSOCIATED WITH TYPE 2 DIABETES MELLITUS: ICD-10-CM

## 2025-07-01 DIAGNOSIS — I10 PRIMARY HYPERTENSION: ICD-10-CM

## 2025-07-01 DIAGNOSIS — E83.52 HYPERCALCEMIA: ICD-10-CM

## 2025-07-01 DIAGNOSIS — I50.22 CHRONIC SYSTOLIC HEART FAILURE: ICD-10-CM

## 2025-07-01 DIAGNOSIS — M76.892 TENDINITIS OF LEFT QUADRICEPS TENDON: ICD-10-CM

## 2025-07-01 DIAGNOSIS — E11.42 DIABETIC SENSORY POLYNEUROPATHY: ICD-10-CM

## 2025-07-01 DIAGNOSIS — N18.32 CKD STAGE G3B/A1, GFR 30-44 AND ALBUMIN CREATININE RATIO <30 MG/G: Primary | ICD-10-CM

## 2025-07-01 PROCEDURE — 1100F PTFALLS ASSESS-DOCD GE2>/YR: CPT | Mod: CPTII,,,

## 2025-07-01 PROCEDURE — 1160F RVW MEDS BY RX/DR IN RCRD: CPT | Mod: CPTII,,,

## 2025-07-01 PROCEDURE — 3288F FALL RISK ASSESSMENT DOCD: CPT | Mod: CPTII,,,

## 2025-07-01 PROCEDURE — 3078F DIAST BP <80 MM HG: CPT | Mod: CPTII,,,

## 2025-07-01 PROCEDURE — 99215 OFFICE O/P EST HI 40 MIN: CPT | Mod: PBBFAC,25 | Performed by: PSYCHIATRY & NEUROLOGY

## 2025-07-01 PROCEDURE — 1159F MED LIST DOCD IN RCRD: CPT | Mod: CPTII,,,

## 2025-07-01 PROCEDURE — 99214 OFFICE O/P EST MOD 30 MIN: CPT | Mod: S$PBB,,,

## 2025-07-01 PROCEDURE — 99215 OFFICE O/P EST HI 40 MIN: CPT | Mod: PBBFAC,25,27

## 2025-07-01 PROCEDURE — 99214 OFFICE O/P EST MOD 30 MIN: CPT | Mod: PBBFAC,25,27 | Performed by: SURGERY

## 2025-07-01 PROCEDURE — 3074F SYST BP LT 130 MM HG: CPT | Mod: CPTII,,,

## 2025-07-01 PROCEDURE — 73564 X-RAY EXAM KNEE 4 OR MORE: CPT | Mod: TC,LT

## 2025-07-01 PROCEDURE — 1160F RVW MEDS BY RX/DR IN RCRD: CPT | Mod: CPTII,,, | Performed by: PSYCHIATRY & NEUROLOGY

## 2025-07-01 PROCEDURE — 1125F AMNT PAIN NOTED PAIN PRSNT: CPT | Mod: CPTII,,, | Performed by: PSYCHIATRY & NEUROLOGY

## 2025-07-01 PROCEDURE — 3074F SYST BP LT 130 MM HG: CPT | Mod: CPTII,,, | Performed by: PSYCHIATRY & NEUROLOGY

## 2025-07-01 PROCEDURE — 1100F PTFALLS ASSESS-DOCD GE2>/YR: CPT | Mod: CPTII,,, | Performed by: PSYCHIATRY & NEUROLOGY

## 2025-07-01 PROCEDURE — 3078F DIAST BP <80 MM HG: CPT | Mod: CPTII,,, | Performed by: PSYCHIATRY & NEUROLOGY

## 2025-07-01 PROCEDURE — 1159F MED LIST DOCD IN RCRD: CPT | Mod: CPTII,,, | Performed by: PSYCHIATRY & NEUROLOGY

## 2025-07-01 PROCEDURE — 99204 OFFICE O/P NEW MOD 45 MIN: CPT | Mod: S$PBB,,, | Performed by: PSYCHIATRY & NEUROLOGY

## 2025-07-01 PROCEDURE — 1126F AMNT PAIN NOTED NONE PRSNT: CPT | Mod: CPTII,,,

## 2025-07-01 PROCEDURE — 3288F FALL RISK ASSESSMENT DOCD: CPT | Mod: CPTII,,, | Performed by: PSYCHIATRY & NEUROLOGY

## 2025-07-01 RX ORDER — DICLOFENAC SODIUM 10 MG/G
2 GEL TOPICAL 4 TIMES DAILY
Qty: 100 G | Refills: 3 | Status: SHIPPED | OUTPATIENT
Start: 2025-07-01

## 2025-07-01 NOTE — PROGRESS NOTES
"Subjective:    Patient ID: Neris Contreras is a 75 y.o. female  who presented to [facility] for new visit.    Chief Complaint: Pain of the Left Knee      History of Present Illness:  Neris Contreras who has no formally previously diagnosed musculoskeletal condition presented today with with a knee injury involving the left knee for the past 3 week. Pain is located suprapatellar. Quality of pain is described as Dull and Aching.  Inciting event: injured during a fall while exercising on the side walkon 6/9/25. Pain is aggravated by going up and down stairs and squatting.  There is some swelling in the distal quadriceps tendon / suprapatellar area. Patient has had no prior knee problems. Evaluation to date: plain films and ER evaluation. She went to ER on 6/10/25 and was evaluated and had an X-ray with no acute injuries. Treatment to date: avoidance of activity. Expectations for today's visit includes evaluation and pain relief.. PCP is Gunjan Montelongo MD .    Knee Review of Systems:  Swelling?  yes  Instability?  no  Mechanical sx?  no  <30 min AM stiffness? no  Limited ROM? no  Fever/Chills? no    Comorbid Conditions:  None         Objective:      Physical Exam:    /75   Pulse 65   Ht 5' 6" (1.676 m)   Wt 77.1 kg (169 lb 15.6 oz)   BMI 27.43 kg/m²     Ortho/SPM Exam    Appearance:  Normal gait/station  FWB  Alignment: Left: normal Right: normal   Soft tissue swelling: Left: yes Right: no  Effusion: Left:  Negative Right: Negative  Erythema: Left no Right: no  Ecchymosis: Left: no Right: no  Atrophy: Left: no Right: no    Palpation:  Knee Tenderness: Left: distal quadriceps tendon, no gap / defect Right: None    Range of motion:  Flexion (140): Left:  140 Right: 140  Extension (0): Left: 0 Right: 0    Strength:  Extension: Left 5/5  Pain: yes     Right 5/5 Pain: no  Flexion: Left 5/5 Pain: no Right   5/5 Pain: no        Special Tests:  Ballotable Effusion:Left: " Negative Right: Negative   Fluid Wave: Left: Negative Right: Negative   Crepitus: Left: Positive Right: Positive   Varus: @ 0, Left Negative Right: Negative.  @ 30, Left Negative  Right Negative   Valgus: @ 0, Left Negative Right: Negative.  @ 30, Left Negative  Right Negative  Lachman: Left: Negative Right: Negative   Posterior Drawer: Left: Negative Right: Negative   Chelo: Left: Negative Right: Negative     General appearance: NAD  Peripheral pulses: normal bilaterally   Reflexes: Left: normal Right normal   Sensation: normal    Labs:  Last A1c: 5.4     Imaging:   Previous images reviewed.  X-rays ordered and performed today: yes  # of views: 4 Laterality: left  My Interpretation:  shows DJD changes, likely chronic, Kellgren Mick grade 3; distal quadriceps enthesophyte       Assessment:        Encounter Diagnoses   Code Name Primary?    M17.12 Primary osteoarthritis of left knee Yes    M76.892 Tendinitis of left quadriceps tendon         Plan:           MDM: Prior external referring provider notes reviewed. Prior external referring provider studies reviewed.   Patient had a ground level fall on the sidewalk on 6/9/25, and had left knee pain since. She had some initial effusion. Was evaluated in the ER 6/10/25, normal X-ray with arthritis.   She reports that her pain has improved since the injury. No mechanical symptoms. There was some swelling limited to the suprapatellar bursa / distal quadriceps tendon, with some localized tenderness. On X-ray today there is some distal quadriceps enthesophytes, and left knee OA (KL grade 3). There was no significant medial / lateral joint line tenderness.   She presentation is more consistent with left distal quadriceps strain / tendinitis > left knee OA exacerbation.  Discussed activity modification (switch to non-impact exercises for the time being), medication (she cannot take NSAIDs due to being on Plavix, so Tylenol / Voltaren gel), PT (she is very active and does  not think she wants PT at this time), injections (deferred at this time, patient want it only if there is a flare given possibility of speeding up arthritis), and surgery (not indicated).  Patient would like to opt for conservative management with medication and activity modification at this time.  Dx: left Knee Osteoarthritis, controlled; left quadriceps tendinitis, acute moderate exacerbation  Treatment Plan: Discussed with patient diagnosis and treatment recommendations. Education provided. Handout given. Recommend conservative treatment to include: avoidance of aggravating activity, significant modification of daily activities, hot/cold therapies, topical and oral medications, braces, HEP/PT/OT, and injections.   Imaging: radiological studies ordered and independently reviewed; discussed with patient; pending radiologist interpretation.   Weight Management: is paramount. Recommend at least 10 pounds weight loss if your BMI is 25-29.9. A BMI of <24.9 may provide further relief.  Procedure: Discussed injections as treatment options; discussed injections as treatment options in future if conservative measures do not improve symptoms.  Activity: Activity as tolerated; HEP to include aerobic conditioning and strength training with non-painful activity. ROM/STG exercises. Proper footware; assistive devises to avoid limping.   Therapy: Physical Therapy offered, patient declined  Medication: START over-the-counter acetaminophen (Tylenol 1000 mg three times per day as needed)  START Voltaren Gel 1% as prescribed to affected area. Please see your primary care physician for further refills. Not a candidate for NSAIDs given being on Plavix.  RTC: PRN; call if any issues.     if left knee flare up, okay to overbook with me in the morning up to 8 patients for CSI visit     Javier Tsang MD  Sports Medicine

## 2025-07-01 NOTE — PROGRESS NOTES
[facility]  Nephrology Clinic    Patient ID: [id number]     Chief Complaint: Follow-up and Chronic Kidney Disease (RTC, took meds, w/o complaints)      HPI:     Neris Contreras is a 75 y.o. Black or  female who presents to nephrology clinic for management of CKD. Patient has pertinent chronic conditions that include hypertension,  chronic constipation, dysphagia, dyslipidemia, peripheral vascular disease, diabetes mellitus type 2, CARISSA, pulmonary hypertension, history of CVA, heart failure with preserved ejection fraction (followed by [facility] cardiology clinic) and obesity .Patient denies any issues.     Past Medical History:   Diagnosis Date    CVA (cerebral vascular accident)     Diabetes     Hypertension         Past Surgical History:   Procedure Laterality Date    CARDIAC CATHETERIZATION      CARDIAC SURGERY      EYE SURGERY      LEG SURGERY Right     OPEN REDUCTION AND INTERNAL FIXATION (ORIF) OF INJURY OF ELBOW      REPAIR OF EYELID Left 5/13/2022    Procedure: REPAIR, EYELID;  Surgeon: Isaak Wang MD;  Location: [facility];  Service: ENT;  Laterality: Left;        Social History     Tobacco Use    Smoking status: Never    Smokeless tobacco: Never   Vaping Use    Vaping status: Never Used   Substance and Sexual Activity    Alcohol use: Yes     Alcohol/week: 1.0 standard drink of alcohol     Types: 1 Glasses of wine per week     Comment: social    Drug use: Never    Sexual activity: Not Currently        Current Outpatient Medications   Medication Instructions    amLODIPine (NORVASC) 10 mg, Oral, Daily    atorvastatin (LIPITOR) 20 mg, Oral, Nightly    benazepriL (LOTENSIN) 40 mg, Oral, Daily    clopidogreL (PLAVIX) 75 mg, Oral, Daily    diclofenac sodium (VOLTAREN ARTHRITIS PAIN) 2 g, Topical (Top), 4 times daily, Do not exceed 32 grams/day: do not to exceed 8 grams/day/single joint of upper extremities; do not to exceed 16 grams/day/single joint of lower extremities.   "Please request refill of this medication from your PCP.    empagliflozin (JARDIANCE) 10 mg, Oral, Daily    fluticasone propionate (FLONASE) 50 mcg, Each Nostril, Daily    furosemide (LASIX) 40 mg, Oral, Daily PRN    hydrALAZINE (APRESOLINE) 100 mg, Oral, Every 12 hours    LINZESS 145 mcg, Oral, Before breakfast    pantoprazole (PROTONIX) 40 mg, Oral, 2 times daily       Review of patient's allergies indicates:   Allergen Reactions    Iodine Hives     topical    Iodine and iodide containing products     Shellfish containing products      Other reaction(s): whelps/ itching          Review of Systems:  12 point review of systems conducted, negative except as stated in the history of present illness. See HPI for details.    Physical Exam:  Visit Vitals  /75   Pulse 65   Temp 97.7 °F (36.5 °C) (Oral)   Resp 18   Ht 5' 5.75" (1.67 m)   Wt 77.5 kg (170 lb 13.7 oz)   SpO2 99%   BMI 27.79 kg/m²     General appearance: Patient is in no acute distress.  Skin: No rashes or wounds.  HEENT: PERRLA, EOMI, no scleral icterus, no JVD. Neck is supple.  Chest: Respirations are unlabored. Lungs sounds are clear.   Heart: S1, S2.   Abdomen: Benign.  : Deferred.  Extremities: No edema, peripheral pulses are palpable.   Neuro: No focal deficits.     Laboratory Data:    Lab Results   Component Value Date    WBC 5.23 07/01/2025    HGB 10.9 (L) 07/01/2025    HCT 35.7 (L) 07/01/2025     07/01/2025    FOLATE 9.4 10/19/2020    [id number] 12/09/2020     (H) 08/03/2020     07/01/2025    K 3.7 07/01/2025    CO2 29 07/01/2025    BUN 19.5 07/01/2025    CREATININE 1.48 (H) 07/01/2025    EGFRNORACEVR 36 07/01/2025    CALCIUM 10.0 07/01/2025    ALKPHOS 70 07/01/2025    ALBUMIN 4.0 07/01/2025    BILIDIR 0.3 04/28/2022    IBILI 0.40 04/28/2022    AST 16 07/01/2025    ALT 11 07/01/2025    MG 2.20 07/01/2025    PHOS 3.4 07/01/2025      Lab Results   Component Value Date    HGBA1C 5.4 06/24/2025    .4 (H) 07/01/2025    " [id number] 22 (L) 06/24/2025    HEPCAB Nonreactive 04/11/2023     Urine:  Lab Results   Component Value Date    APPEARANCEUA Clear 01/06/2025    SGUA 1.017 01/06/2025    PROTEINUA Negative 01/06/2025    KETONESUA Negative 01/06/2025    LEUKOCYTESUR Negative 01/06/2025    RBCUA 0-5 01/06/2025    WBCUA 0-5 01/06/2025    BACTERIA None Seen 01/06/2025    SQEPUA Trace (A) 01/06/2025    HYALINECASTS 0-2 (A) 01/06/2025    CREATRANDUR 130.2 (H) 06/24/2025    PROTEINURINE 8.3 01/06/2025    UPROTCREA 0.1 01/06/2025         Imaging:  US Retroperitoneal Limited 11/28/2023  The kidneys are symmetric and normal in size and position with the right kidney measuring 8.8 cm in length and the left kidney 10.1 cm in length.  There is no abnormal renal cortical echogenicity or cortical thinning.  A single simple right interpolar renal cyst measures 3.2 x 3 x 2.7 cm.  A single simple left renal cyst measures 4 x 3.9 x 3.7 cm.  No nephrolithiasis is identified.  There is no hydronephrosis.  Normal color Doppler vascular flow is demonstrated at both kidneys.  There is no perirenal free fluid or mass.  The proximal IVC is widely patent and normal.  The bladder was not imaged.     Impression  Single simple bilateral renal cysts with no additional renal pathology identified.  Electronically signed by:Nazario Lyons  Date:                                         11/28/2023  Time:                                        08:38    Impression:    ICD-10-CM ICD-9-CM   1. CKD stage G3b/A1, GFR 30-44 and albumin creatinine ratio <30 mg/g  N18.32 585.3   2. Primary hypertension  I10 401.9   3. Chronic systolic heart failure  I50.22 428.22   4. Hypercalcemia  E83.52 275.42   5. Secondary hyperparathyroidism of renal origin  N25.81 588.81   6. BMI 27.0-27.9,adult  Z68.27 V85.23        Plan:  1. CKD stage G3b/A1, GFR 30-44 and albumin creatinine ratio <30 mg/g  Renal indices has slightly worsened since last visit. 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 She reports that does not drink water until she is finished working outside. Encouraged patient to increase water intake in extreme temperatures to reduce the risk of dehydration.  Likely diabetic kidney disease. There is no significant proteinuria.   Renasight genetic test was negative.  Continue risk factor management, ACE inhibitor, and SGLT2 inhibitor. Spironolactone was discontinued on last visit due to worsening kidney function  Continue renal sparing activities:  -2 g a day dietary sodium restriction  -optimize glycemic control (goal A1c is less than 7%)   -control high blood pressure (goal blood pressure is less than 130/80, patient was advised to check blood pressure once or twice a week and bring blood pressure logs to next office visit)  -exercise at least 30 minutes a day, 5 days a week  -maintain healthy weight  -decrease or stop alcohol use   -do not smoke   -stay well hydrated (drink water only, avoid juices, sweet tea, and sodas)  -ask about staying up-to-date on vaccinations (flu vaccine, pneumonia vaccine, hepatitis B vaccine)  -avoid excessive use of NSAIDs (ibuprofen, naproxen, Aleve, Advil, Toradol, Mobic), take Tylenol as needed for headache or mild pain  -take cholesterol lowering medications if prescribed (LDL goal less than 100)      2. Primary hypertension  Blood pressure reading is at goal, continue current antihypertensive regimen and 2 g a day dietary sodium restriction.     3. Chronic systolic heart failure  Patient is euvolemic on exam. Continue current regimen.    4. Hypercalcemia  Calcium is WNL today, will continue to monitor periodically.    5. Secondary hyperparathyroidism of renal origin  There are no indications for active vitamin-D analogs at this time.  Will continue to monitor intact PTH, calcium, and phosphorus levels periodically.       6. BMI 27.0-27.9,adult  Lifestyle and dietary interventions discussed, patient encouraged to maintain  non-sedentary lifestyle and well-balanced diet.        Follow up in about 6 months (around 1/1/2026) for with labwork prior to visit, Follow Up.     No orders of the defined types were placed in this encounter.       BRENDAN Sepulveda  [facility]

## 2025-07-10 ENCOUNTER — HOSPITAL ENCOUNTER (OUTPATIENT)
Dept: RADIOLOGY | Facility: HOSPITAL | Age: 75
Discharge: HOME | End: 2025-07-10
Payer: MEDICARE

## 2025-07-10 DIAGNOSIS — Z78.0 ASYMPTOMATIC MENOPAUSAL STATE: ICD-10-CM

## 2025-07-10 DIAGNOSIS — E55.9 VITAMIN D DEFICIENCY: ICD-10-CM

## 2025-07-10 PROCEDURE — 76700 US EXAM ABDOM COMPLETE: CPT | Mod: TC

## 2025-07-10 PROCEDURE — 77080 DXA BONE DENSITY AXIAL: CPT | Mod: TC

## 2025-07-14 ENCOUNTER — TELEPHONE (OUTPATIENT)
Dept: FAMILY MEDICINE | Facility: CLINIC | Age: 75
End: 2025-07-14
Payer: MEDICARE

## 2025-08-06 ENCOUNTER — HOSPITAL ENCOUNTER (EMERGENCY)
Facility: HOSPITAL | Age: 75
Discharge: HOME | End: 2025-08-06
Attending: EMERGENCY MEDICINE
Payer: MEDICARE

## 2025-08-06 VITALS
OXYGEN SATURATION: 99 % | DIASTOLIC BLOOD PRESSURE: 76 MMHG | BODY MASS INDEX: 26.66 KG/M2 | HEIGHT: 65 IN | SYSTOLIC BLOOD PRESSURE: 128 MMHG | RESPIRATION RATE: 18 BRPM | WEIGHT: 160 LBS | TEMPERATURE: 98 F | HEART RATE: 81 BPM

## 2025-08-06 DIAGNOSIS — M25.529 ELBOW PAIN: Primary | ICD-10-CM

## 2025-08-06 DIAGNOSIS — M70.22 OLECRANON BURSITIS OF LEFT ELBOW: ICD-10-CM

## 2025-08-06 DIAGNOSIS — M79.89 ARM SWELLING: ICD-10-CM

## 2025-08-06 DIAGNOSIS — M79.89 SWELLING OF LEFT HAND: ICD-10-CM

## 2025-08-06 DIAGNOSIS — I10 BENIGN ESSENTIAL HYPERTENSION: ICD-10-CM

## 2025-08-06 LAB
ALBUMIN SERPL-MCNC: 3.7 G/DL (ref 3.4–4.8)
ALBUMIN/GLOB SERPL: 0.9 RATIO (ref 1.1–2)
ALP SERPL-CCNC: 76 UNIT/L (ref 40–150)
ALT SERPL-CCNC: 10 UNIT/L (ref 0–55)
ANION GAP SERPL CALC-SCNC: 6 MEQ/L
AST SERPL-CCNC: 16 UNIT/L (ref 11–45)
BASOPHILS # BLD AUTO: 0.05 X10(3)/MCL
BASOPHILS NFR BLD AUTO: 0.8 %
BILIRUB SERPL-MCNC: 0.6 MG/DL
BUN SERPL-MCNC: 12.8 MG/DL (ref 9.8–20.1)
CALCIUM SERPL-MCNC: 9.7 MG/DL (ref 8.4–10.2)
CHLORIDE SERPL-SCNC: 108 MMOL/L (ref 98–107)
CO2 SERPL-SCNC: 26 MMOL/L (ref 23–31)
CREAT SERPL-MCNC: 1.19 MG/DL (ref 0.55–1.02)
CREAT/UREA NIT SERPL: 11
EOSINOPHIL # BLD AUTO: 0.2 X10(3)/MCL (ref 0–0.9)
EOSINOPHIL NFR BLD AUTO: 3.1 %
ERYTHROCYTE [DISTWIDTH] IN BLOOD BY AUTOMATED COUNT: 14.1 % (ref 11.5–17)
GFR SERPLBLD CREATININE-BSD FMLA CKD-EPI: 47 ML/MIN/1.73/M2
GLOBULIN SER-MCNC: 3.9 GM/DL (ref 2.4–3.5)
GLUCOSE SERPL-MCNC: 92 MG/DL (ref 82–115)
HCT VFR BLD AUTO: 32.9 % (ref 37–47)
HGB BLD-MCNC: 10 G/DL (ref 12–16)
IMM GRANULOCYTES # BLD AUTO: 0.03 X10(3)/MCL (ref 0–0.04)
IMM GRANULOCYTES NFR BLD AUTO: 0.5 %
LYMPHOCYTES # BLD AUTO: 1.67 X10(3)/MCL (ref 0.6–4.6)
LYMPHOCYTES NFR BLD AUTO: 26.1 %
MCH RBC QN AUTO: 27.2 PG (ref 27–31)
MCHC RBC AUTO-ENTMCNC: 30.4 G/DL (ref 33–36)
MCV RBC AUTO: 89.4 FL (ref 80–94)
MONOCYTES # BLD AUTO: 0.62 X10(3)/MCL (ref 0.1–1.3)
MONOCYTES NFR BLD AUTO: 9.7 %
NEUTROPHILS # BLD AUTO: 3.82 X10(3)/MCL (ref 2.1–9.2)
NEUTROPHILS NFR BLD AUTO: 59.8 %
NRBC BLD AUTO-RTO: 0 %
OHS CV CPX PATIENT HEIGHT IN: 65
PLATELET # BLD AUTO: 262 X10(3)/MCL (ref 130–400)
PMV BLD AUTO: 9.4 FL (ref 7.4–10.4)
POTASSIUM SERPL-SCNC: 3.6 MMOL/L (ref 3.5–5.1)
PROT SERPL-MCNC: 7.6 GM/DL (ref 5.8–7.6)
RBC # BLD AUTO: 3.68 X10(6)/MCL (ref 4.2–5.4)
SODIUM SERPL-SCNC: 140 MMOL/L (ref 136–145)
WBC # BLD AUTO: 6.39 X10(3)/MCL (ref 4.5–11.5)

## 2025-08-06 PROCEDURE — 25000003 PHARM REV CODE 250: Performed by: EMERGENCY MEDICINE

## 2025-08-06 PROCEDURE — 85025 COMPLETE CBC W/AUTO DIFF WBC: CPT | Performed by: NURSE PRACTITIONER

## 2025-08-06 PROCEDURE — 99285 EMERGENCY DEPT VISIT HI MDM: CPT | Mod: 25

## 2025-08-06 PROCEDURE — 80053 COMPREHEN METABOLIC PANEL: CPT | Performed by: NURSE PRACTITIONER

## 2025-08-06 RX ORDER — NAPROXEN 500 MG/1
500 TABLET ORAL 2 TIMES DAILY PRN
Qty: 20 TABLET | Refills: 0 | Status: SHIPPED | OUTPATIENT
Start: 2025-08-06

## 2025-08-06 RX ORDER — HYDROCODONE BITARTRATE AND ACETAMINOPHEN 5; 325 MG/1; MG/1
1 TABLET ORAL
Refills: 0 | Status: COMPLETED | OUTPATIENT
Start: 2025-08-06 | End: 2025-08-06

## 2025-08-06 RX ORDER — HYDROCODONE BITARTRATE AND ACETAMINOPHEN 5; 325 MG/1; MG/1
1 TABLET ORAL EVERY 6 HOURS PRN
Qty: 5 TABLET | Refills: 0 | Status: SHIPPED | OUTPATIENT
Start: 2025-08-06

## 2025-08-06 RX ORDER — NAPROXEN 500 MG/1
500 TABLET ORAL
Status: COMPLETED | OUTPATIENT
Start: 2025-08-06 | End: 2025-08-06

## 2025-08-06 RX ADMIN — NAPROXEN 500 MG: 500 TABLET ORAL at 11:08

## 2025-08-06 RX ADMIN — HYDROCODONE BITARTRATE AND ACETAMINOPHEN 1 TABLET: 5; 325 TABLET ORAL at 10:08
